# Patient Record
Sex: FEMALE | Race: WHITE | Employment: OTHER | URBAN - NONMETROPOLITAN AREA
[De-identification: names, ages, dates, MRNs, and addresses within clinical notes are randomized per-mention and may not be internally consistent; named-entity substitution may affect disease eponyms.]

---

## 2017-06-12 RX ORDER — METOPROLOL TARTRATE 50 MG/1
50 TABLET, FILM COATED ORAL 2 TIMES DAILY
Qty: 180 TABLET | Refills: 3 | Status: SHIPPED | OUTPATIENT
Start: 2017-06-12 | End: 2017-08-18

## 2017-08-18 RX ORDER — DULOXETIN HYDROCHLORIDE 30 MG/1
30 CAPSULE, DELAYED RELEASE ORAL DAILY
COMMUNITY
End: 2018-02-02 | Stop reason: SDUPTHER

## 2017-08-18 RX ORDER — LISINOPRIL 20 MG/1
20 TABLET ORAL 2 TIMES DAILY
COMMUNITY
End: 2017-12-12 | Stop reason: CLARIF

## 2017-10-04 DIAGNOSIS — E11.8 TYPE 2 DIABETES MELLITUS WITH COMPLICATION, WITH LONG-TERM CURRENT USE OF INSULIN (HCC): Primary | ICD-10-CM

## 2017-10-04 DIAGNOSIS — Z79.4 TYPE 2 DIABETES MELLITUS WITH COMPLICATION, WITH LONG-TERM CURRENT USE OF INSULIN (HCC): Primary | ICD-10-CM

## 2017-10-05 RX ORDER — ZOLPIDEM TARTRATE 10 MG/1
10 TABLET ORAL NIGHTLY PRN
Qty: 90 TABLET | Refills: 0 | Status: SHIPPED | OUTPATIENT
Start: 2017-10-05 | End: 2017-10-19

## 2017-12-12 ENCOUNTER — OFFICE VISIT (OUTPATIENT)
Dept: INTERNAL MEDICINE | Age: 57
End: 2017-12-12
Payer: MEDICARE

## 2017-12-12 VITALS
DIASTOLIC BLOOD PRESSURE: 80 MMHG | BODY MASS INDEX: 45.99 KG/M2 | SYSTOLIC BLOOD PRESSURE: 132 MMHG | WEIGHT: 293 LBS | HEART RATE: 86 BPM | OXYGEN SATURATION: 97 % | HEIGHT: 67 IN

## 2017-12-12 DIAGNOSIS — Z79.4 TYPE 2 DIABETES MELLITUS WITH DIABETIC POLYNEUROPATHY, WITH LONG-TERM CURRENT USE OF INSULIN (HCC): ICD-10-CM

## 2017-12-12 DIAGNOSIS — E11.42 TYPE 2 DIABETES MELLITUS WITH DIABETIC POLYNEUROPATHY, WITH LONG-TERM CURRENT USE OF INSULIN (HCC): ICD-10-CM

## 2017-12-12 DIAGNOSIS — Z79.4 TYPE 2 DIABETES MELLITUS WITH COMPLICATION, WITH LONG-TERM CURRENT USE OF INSULIN (HCC): ICD-10-CM

## 2017-12-12 DIAGNOSIS — E78.2 MIXED HYPERLIPIDEMIA: ICD-10-CM

## 2017-12-12 DIAGNOSIS — E11.8 TYPE 2 DIABETES MELLITUS WITH COMPLICATION, WITH LONG-TERM CURRENT USE OF INSULIN (HCC): ICD-10-CM

## 2017-12-12 DIAGNOSIS — E03.9 ACQUIRED HYPOTHYROIDISM: ICD-10-CM

## 2017-12-12 DIAGNOSIS — I10 ESSENTIAL HYPERTENSION: ICD-10-CM

## 2017-12-12 LAB
ALBUMIN SERPL-MCNC: 4.1 G/DL (ref 3.5–5.2)
ALP BLD-CCNC: 97 U/L (ref 35–104)
ALT SERPL-CCNC: 21 U/L (ref 5–33)
ANION GAP SERPL CALCULATED.3IONS-SCNC: 15 MMOL/L (ref 7–19)
AST SERPL-CCNC: 24 U/L (ref 5–32)
BILIRUB SERPL-MCNC: 0.5 MG/DL (ref 0.2–1.2)
BUN BLDV-MCNC: 13 MG/DL (ref 6–20)
CALCIUM SERPL-MCNC: 8.9 MG/DL (ref 8.6–10)
CHLORIDE BLD-SCNC: 101 MMOL/L (ref 98–111)
CHOLESTEROL, TOTAL: 185 MG/DL (ref 160–199)
CO2: 27 MMOL/L (ref 22–29)
CREAT SERPL-MCNC: 0.6 MG/DL (ref 0.5–0.9)
GFR NON-AFRICAN AMERICAN: >60
GLUCOSE BLD-MCNC: 157 MG/DL (ref 74–109)
HBA1C MFR BLD: 9.7 %
HCT VFR BLD CALC: 43.4 % (ref 37–47)
HDLC SERPL-MCNC: 65 MG/DL (ref 65–121)
HEMOGLOBIN: 14.4 G/DL (ref 12–16)
LDL CHOLESTEROL CALCULATED: 93 MG/DL
MCH RBC QN AUTO: 30.3 PG (ref 27–31)
MCHC RBC AUTO-ENTMCNC: 33.2 G/DL (ref 33–37)
MCV RBC AUTO: 91.2 FL (ref 81–99)
PDW BLD-RTO: 13 % (ref 11.5–14.5)
PLATELET # BLD: 330 K/UL (ref 130–400)
PMV BLD AUTO: 9.7 FL (ref 9.4–12.3)
POTASSIUM SERPL-SCNC: 3.9 MMOL/L (ref 3.5–5)
RBC # BLD: 4.76 M/UL (ref 4.2–5.4)
SODIUM BLD-SCNC: 143 MMOL/L (ref 136–145)
TOTAL PROTEIN: 7.1 G/DL (ref 6.6–8.7)
TRIGL SERPL-MCNC: 137 MG/DL (ref 0–149)
TSH SERPL DL<=0.05 MIU/L-ACNC: 7.41 UIU/ML (ref 0.27–4.2)
WBC # BLD: 7.9 K/UL (ref 4.8–10.8)

## 2017-12-12 PROCEDURE — G8484 FLU IMMUNIZE NO ADMIN: HCPCS | Performed by: INTERNAL MEDICINE

## 2017-12-12 PROCEDURE — G8417 CALC BMI ABV UP PARAM F/U: HCPCS | Performed by: INTERNAL MEDICINE

## 2017-12-12 PROCEDURE — 3046F HEMOGLOBIN A1C LEVEL >9.0%: CPT | Performed by: INTERNAL MEDICINE

## 2017-12-12 PROCEDURE — G8427 DOCREV CUR MEDS BY ELIG CLIN: HCPCS | Performed by: INTERNAL MEDICINE

## 2017-12-12 PROCEDURE — 3017F COLORECTAL CA SCREEN DOC REV: CPT | Performed by: INTERNAL MEDICINE

## 2017-12-12 PROCEDURE — 1036F TOBACCO NON-USER: CPT | Performed by: INTERNAL MEDICINE

## 2017-12-12 PROCEDURE — 3014F SCREEN MAMMO DOC REV: CPT | Performed by: INTERNAL MEDICINE

## 2017-12-12 PROCEDURE — 99213 OFFICE O/P EST LOW 20 MIN: CPT | Performed by: INTERNAL MEDICINE

## 2017-12-12 RX ORDER — METOPROLOL TARTRATE 50 MG/1
50 TABLET, FILM COATED ORAL 2 TIMES DAILY
COMMUNITY
End: 2018-06-22 | Stop reason: CLARIF

## 2017-12-12 RX ORDER — MELOXICAM 15 MG/1
15 TABLET ORAL DAILY
Qty: 90 TABLET | Refills: 3
Start: 2017-12-12 | End: 2018-01-20 | Stop reason: SDUPTHER

## 2017-12-12 RX ORDER — LEVOTHYROXINE SODIUM 175 UG/1
175 TABLET ORAL DAILY
Qty: 30 TABLET | Refills: 3
Start: 2017-12-12 | End: 2018-05-13 | Stop reason: SDUPTHER

## 2017-12-12 RX ORDER — MELOXICAM 10 MG/1
15 CAPSULE ORAL DAILY
Qty: 90 CAPSULE | Refills: 3
Start: 2017-12-12 | End: 2018-05-24 | Stop reason: SDUPTHER

## 2017-12-28 ENCOUNTER — OFFICE VISIT (OUTPATIENT)
Dept: URGENT CARE | Age: 57
End: 2017-12-28
Payer: MEDICARE

## 2017-12-28 VITALS
SYSTOLIC BLOOD PRESSURE: 138 MMHG | HEART RATE: 81 BPM | TEMPERATURE: 98.1 F | WEIGHT: 293 LBS | OXYGEN SATURATION: 95 % | BODY MASS INDEX: 47.5 KG/M2 | RESPIRATION RATE: 20 BRPM | DIASTOLIC BLOOD PRESSURE: 80 MMHG

## 2017-12-28 DIAGNOSIS — J06.9 UPPER RESPIRATORY TRACT INFECTION, UNSPECIFIED TYPE: Primary | ICD-10-CM

## 2017-12-28 DIAGNOSIS — R52 BODY ACHES: ICD-10-CM

## 2017-12-28 DIAGNOSIS — J02.9 SORE THROAT: ICD-10-CM

## 2017-12-28 LAB
INFLUENZA A ANTIBODY: NORMAL
INFLUENZA B ANTIBODY: NORMAL
S PYO AG THROAT QL: NORMAL

## 2017-12-28 PROCEDURE — 87880 STREP A ASSAY W/OPTIC: CPT | Performed by: NURSE PRACTITIONER

## 2017-12-28 PROCEDURE — G8484 FLU IMMUNIZE NO ADMIN: HCPCS | Performed by: NURSE PRACTITIONER

## 2017-12-28 PROCEDURE — 87804 INFLUENZA ASSAY W/OPTIC: CPT | Performed by: NURSE PRACTITIONER

## 2017-12-28 PROCEDURE — 1036F TOBACCO NON-USER: CPT | Performed by: NURSE PRACTITIONER

## 2017-12-28 PROCEDURE — 3017F COLORECTAL CA SCREEN DOC REV: CPT | Performed by: NURSE PRACTITIONER

## 2017-12-28 PROCEDURE — 3014F SCREEN MAMMO DOC REV: CPT | Performed by: NURSE PRACTITIONER

## 2017-12-28 PROCEDURE — G8427 DOCREV CUR MEDS BY ELIG CLIN: HCPCS | Performed by: NURSE PRACTITIONER

## 2017-12-28 PROCEDURE — G8417 CALC BMI ABV UP PARAM F/U: HCPCS | Performed by: NURSE PRACTITIONER

## 2017-12-28 PROCEDURE — 99213 OFFICE O/P EST LOW 20 MIN: CPT | Performed by: NURSE PRACTITIONER

## 2017-12-28 RX ORDER — AZITHROMYCIN 250 MG/1
TABLET, FILM COATED ORAL
Qty: 1 PACKET | Refills: 0 | Status: SHIPPED | OUTPATIENT
Start: 2017-12-28 | End: 2018-01-07

## 2017-12-28 ASSESSMENT — ENCOUNTER SYMPTOMS
SINUS PRESSURE: 1
GASTROINTESTINAL NEGATIVE: 1
COUGH: 1
SORE THROAT: 1

## 2017-12-28 NOTE — PATIENT INSTRUCTIONS
acetaminophen (Tylenol) can be harmful. · Get plenty of rest.  · Do not smoke or allow others to smoke around you. If you need help quitting, talk to your doctor about stop-smoking programs and medicines. These can increase your chances of quitting for good. When should you call for help? Call 911 anytime you think you may need emergency care. For example, call if:  ? · You have severe trouble breathing. ?Call your doctor now or seek immediate medical care if:  ? · You seem to be getting much sicker. ? · You have new or worse trouble breathing. ? · You have a new or higher fever. ? · You have a new rash. ? Watch closely for changes in your health, and be sure to contact your doctor if:  ? · You have a new symptom, such as a sore throat, an earache, or sinus pain. ? · You cough more deeply or more often, especially if you notice more mucus or a change in the color of your mucus. ? · You do not get better as expected. Where can you learn more? Go to [email or web address]. org and sign in to your [employer] account. Enter E940 in the [employer] box to learn more about \"Upper Respiratory Infection (Cold): Care Instructions. \"     If you do not have an account, please click on the \"Sign Up Now\" link. Current as of: May 12, 2017  Content Version: 11.4  © 0272-3996 [employer]. Care instructions adapted under license by [facility] ([facility]). If you have questions about a medical condition or this instruction, always ask your healthcare professional. [place] [address] any warranty or liability for your use of this information. 1. Rest and increase fluids  2. Take zithromax for full course  3. Take nyquil as needed for coughing  4. Monitor for fever and treat as needed   5.  If patient is not improving or developing any new/worsening symptoms then RTC, prn or follow up with PCP

## 2017-12-28 NOTE — PROGRESS NOTES
meals 180 tablet 3    DULoxetine (CYMBALTA) 30 MG extended release capsule Take 30 mg by mouth daily      simvastatin (ZOCOR) 40 MG tablet Take 40 mg by mouth nightly       No current facility-administered medications for this visit. Allergies   Allergen Reactions    Compazine [Prochlorperazine Maleate] Anaphylaxis       Health Maintenance   Topic Date Due    Hepatitis C screen  1960    Diabetic foot exam  06/12/1970    Diabetic retinal exam  06/12/1970    HIV screen  06/12/1975    Diabetic microalbuminuria test  06/12/1978    DTaP/Tdap/Td vaccine (1 - Tdap) 06/12/1979    Pneumococcal med risk (1 of 1 - PPSV23) 06/12/1979    Cervical cancer screen  06/12/1981    Colon cancer screen colonoscopy  06/12/2010    Breast cancer screen  12/16/2016    Flu vaccine (1) 09/01/2017    Diabetic hemoglobin A1C test  03/12/2018    Lipid screen  12/12/2018       Subjective:      Review of Systems   Constitutional: Negative for fever. HENT: Positive for congestion, postnasal drip, sinus pressure and sore throat. Negative for ear pain. Respiratory: Positive for cough. Cardiovascular: Negative. Gastrointestinal: Negative. Neurological: Positive for headaches. Objective:     Physical Exam   Constitutional: She is oriented to person, place, and time. She appears well-developed and well-nourished. No distress. HENT:   Head: Normocephalic and atraumatic. Right Ear: Hearing, tympanic membrane, external ear and ear canal normal.   Left Ear: Hearing, tympanic membrane, external ear and ear canal normal.   Nose: Rhinorrhea present. Right sinus exhibits no maxillary sinus tenderness and no frontal sinus tenderness. Left sinus exhibits no maxillary sinus tenderness and no frontal sinus tenderness. Mouth/Throat: Uvula is midline and mucous membranes are normal. Posterior oropharyngeal erythema present. Postnasal drainage   Eyes: Pupils are equal, round, and reactive to light.    Neck: Normal range of motion. Cardiovascular: Normal rate, regular rhythm and normal heart sounds. No murmur heard. Pulmonary/Chest: Effort normal and breath sounds normal. No respiratory distress. She has no wheezes. She has no rales. Musculoskeletal: Normal range of motion. Lymphadenopathy:     She has no cervical adenopathy. Neurological: She is alert and oriented to person, place, and time. Skin: Skin is warm and dry. No rash noted. No erythema. Psychiatric: She has a normal mood and affect. Her speech is normal and behavior is normal. Judgment and thought content normal.   Nursing note and vitals reviewed. /80   Pulse 81   Temp 98.1 °F (36.7 °C) (Oral)   Resp 20   Wt (!) 303 lb 4 oz (137.6 kg)   SpO2 95%   BMI 47.50 kg/m²     Assessment:      1. Upper respiratory tract infection, unspecified type     2. Body aches  POCT Influenza A/B   3. Sore throat  POCT rapid strep A       Plan:   Strep in office was negative. Flu in office was negative. Discussed diagnosis and treatment with patient. Instructed on rest and increase fluids. She is to monitor for fever and treat as needed. She is to take nyquil as needed for coughing. Advised symptomatic treatment. If patient is not improving or developing any new/worsening symptoms then RTC, prn or go to ER. Patient is to follow up with PCP as needed. Patient verbalizes understanding. Orders Placed This Encounter   Procedures    POCT rapid strep A    POCT Influenza A/B       No Follow-up on file. Orders Placed This Encounter   Procedures    POCT rapid strep A    POCT Influenza A/B     Orders Placed This Encounter   Medications    azithromycin (ZITHROMAX) 250 MG tablet     Sig: Take 2 tabs (500 mg) on Day 1, and take 1 tab (250 mg) on days 2 through 5. Dispense:  1 packet     Refill:  0       Patient given educational materials - see patient instructions. Discussed use, benefit, and side effects of prescribed medications.   All patient questions answered. Pt voiced understanding. Reviewed health maintenance. Instructed to continue current medications, diet and exercise. Patient agreed with treatment plan. Follow up as directed. Patient Instructions       Patient Education        Upper Respiratory Infection (Cold): Care Instructions  Your Care Instructions    An upper respiratory infection, or URI, is an infection of the nose, sinuses, or throat. URIs are spread by coughs, sneezes, and direct contact. The common cold is the most frequent kind of URI. The flu and sinus infections are other kinds of URIs. Almost all URIs are caused by viruses. Antibiotics won't cure them. But you can treat most infections with home care. This may include drinking lots of fluids and taking over-the-counter pain medicine. You will probably feel better in 4 to 10 days. The doctor has checked you carefully, but problems can develop later. If you notice any problems or new symptoms, get medical treatment right away. Follow-up care is a key part of your treatment and safety. Be sure to make and go to all appointments, and call your doctor if you are having problems. It's also a good idea to know your test results and keep a list of the medicines you take. How can you care for yourself at home? · To prevent dehydration, drink plenty of fluids, enough so that your urine is light yellow or clear like water. Choose water and other caffeine-free clear liquids until you feel better. If you have kidney, heart, or liver disease and have to limit fluids, talk with your doctor before you increase the amount of fluids you drink. · Take an over-the-counter pain medicine, such as acetaminophen (Tylenol), ibuprofen (Advil, Motrin), or naproxen (Aleve). Read and follow all instructions on the label. · Before you use cough and cold medicines, check the label. These medicines may not be safe for young children or for people with certain health problems.   · Be careful when

## 2017-12-29 RX ORDER — SIMVASTATIN 80 MG
TABLET ORAL
Qty: 90 TABLET | Refills: 3 | Status: SHIPPED | OUTPATIENT
Start: 2017-12-29 | End: 2019-01-23 | Stop reason: SDUPTHER

## 2018-01-01 ASSESSMENT — ENCOUNTER SYMPTOMS
BACK PAIN: 0
COUGH: 0
SINUS PRESSURE: 0
EYE DISCHARGE: 0
SHORTNESS OF BREATH: 0
ABDOMINAL DISTENTION: 0
ABDOMINAL PAIN: 0
EYE REDNESS: 0

## 2018-01-10 ENCOUNTER — TELEPHONE (OUTPATIENT)
Dept: INTERNAL MEDICINE | Age: 58
End: 2018-01-10

## 2018-01-10 DIAGNOSIS — Z79.4 TYPE 2 DIABETES MELLITUS WITHOUT COMPLICATION, WITH LONG-TERM CURRENT USE OF INSULIN (HCC): Primary | ICD-10-CM

## 2018-01-10 DIAGNOSIS — E11.9 TYPE 2 DIABETES MELLITUS WITHOUT COMPLICATION, WITH LONG-TERM CURRENT USE OF INSULIN (HCC): Primary | ICD-10-CM

## 2018-01-10 RX ORDER — BLOOD-GLUCOSE METER
1 KIT MISCELLANEOUS 3 TIMES DAILY
Qty: 1 KIT | Refills: 0 | Status: SHIPPED | OUTPATIENT
Start: 2018-01-10 | End: 2018-05-24 | Stop reason: ALTCHOICE

## 2018-01-22 RX ORDER — MELOXICAM 15 MG/1
TABLET ORAL
Qty: 90 TABLET | Refills: 3 | Status: SHIPPED | OUTPATIENT
Start: 2018-01-22 | End: 2018-05-24 | Stop reason: SDUPTHER

## 2018-02-12 RX ORDER — ZOLPIDEM TARTRATE 10 MG/1
10 TABLET ORAL NIGHTLY PRN
Qty: 30 TABLET | Refills: 1 | Status: SHIPPED | OUTPATIENT
Start: 2018-02-12 | End: 2018-02-22 | Stop reason: SDUPTHER

## 2018-02-22 ENCOUNTER — TELEPHONE (OUTPATIENT)
Dept: INTERNAL MEDICINE | Age: 58
End: 2018-02-22

## 2018-02-22 RX ORDER — ZOLPIDEM TARTRATE 10 MG/1
10 TABLET ORAL NIGHTLY PRN
Qty: 30 TABLET | Refills: 1 | Status: SHIPPED | OUTPATIENT
Start: 2018-02-22 | End: 2018-03-24

## 2018-03-15 ENCOUNTER — OFFICE VISIT (OUTPATIENT)
Dept: URGENT CARE | Age: 58
End: 2018-03-15
Payer: MEDICARE

## 2018-03-15 VITALS
WEIGHT: 293 LBS | DIASTOLIC BLOOD PRESSURE: 82 MMHG | SYSTOLIC BLOOD PRESSURE: 116 MMHG | OXYGEN SATURATION: 97 % | HEIGHT: 67 IN | RESPIRATION RATE: 20 BRPM | BODY MASS INDEX: 45.99 KG/M2 | HEART RATE: 89 BPM | TEMPERATURE: 98 F

## 2018-03-15 DIAGNOSIS — R05.9 COUGH: Primary | ICD-10-CM

## 2018-03-15 PROCEDURE — G8417 CALC BMI ABV UP PARAM F/U: HCPCS | Performed by: PHYSICIAN ASSISTANT

## 2018-03-15 PROCEDURE — 3014F SCREEN MAMMO DOC REV: CPT | Performed by: PHYSICIAN ASSISTANT

## 2018-03-15 PROCEDURE — G8427 DOCREV CUR MEDS BY ELIG CLIN: HCPCS | Performed by: PHYSICIAN ASSISTANT

## 2018-03-15 PROCEDURE — 3017F COLORECTAL CA SCREEN DOC REV: CPT | Performed by: PHYSICIAN ASSISTANT

## 2018-03-15 PROCEDURE — 1036F TOBACCO NON-USER: CPT | Performed by: PHYSICIAN ASSISTANT

## 2018-03-15 PROCEDURE — G8484 FLU IMMUNIZE NO ADMIN: HCPCS | Performed by: PHYSICIAN ASSISTANT

## 2018-03-15 PROCEDURE — 99213 OFFICE O/P EST LOW 20 MIN: CPT | Performed by: PHYSICIAN ASSISTANT

## 2018-03-15 RX ORDER — GUAIFENESIN AND DEXTROMETHORPHAN HYDROBROMIDE 600; 30 MG/1; MG/1
1 TABLET, EXTENDED RELEASE ORAL 3 TIMES DAILY
Qty: 28 TABLET | Refills: 0 | Status: SHIPPED | OUTPATIENT
Start: 2018-03-15 | End: 2018-06-22 | Stop reason: CLARIF

## 2018-03-15 ASSESSMENT — ENCOUNTER SYMPTOMS
DIARRHEA: 0
ABDOMINAL PAIN: 0
SORE THROAT: 0
RHINORRHEA: 1
NAUSEA: 0
COUGH: 1
VOMITING: 0

## 2018-03-15 NOTE — PROGRESS NOTES
Cough      Plan:      - Start Mucinex DM today. Take as directed. - Notify clinic or follow-up with PCP with any change in or worsening of symptoms.   - Return as needed.

## 2018-03-20 DIAGNOSIS — E11.42 TYPE 2 DIABETES MELLITUS WITH DIABETIC POLYNEUROPATHY, WITH LONG-TERM CURRENT USE OF INSULIN (HCC): ICD-10-CM

## 2018-03-20 DIAGNOSIS — Z79.4 TYPE 2 DIABETES MELLITUS WITH DIABETIC POLYNEUROPATHY, WITH LONG-TERM CURRENT USE OF INSULIN (HCC): ICD-10-CM

## 2018-03-20 LAB
ALBUMIN SERPL-MCNC: 4 G/DL (ref 3.5–5.2)
ALP BLD-CCNC: 104 U/L (ref 35–104)
ALT SERPL-CCNC: 27 U/L (ref 5–33)
ANION GAP SERPL CALCULATED.3IONS-SCNC: 16 MMOL/L (ref 7–19)
AST SERPL-CCNC: 30 U/L (ref 5–32)
BILIRUB SERPL-MCNC: 0.5 MG/DL (ref 0.2–1.2)
BUN BLDV-MCNC: 18 MG/DL (ref 6–20)
CALCIUM SERPL-MCNC: 9 MG/DL (ref 8.6–10)
CHLORIDE BLD-SCNC: 100 MMOL/L (ref 98–111)
CO2: 27 MMOL/L (ref 22–29)
CREAT SERPL-MCNC: 0.6 MG/DL (ref 0.5–0.9)
CREATININE URINE: 411 MG/DL (ref 4.2–622)
GFR NON-AFRICAN AMERICAN: >60
GLUCOSE BLD-MCNC: 202 MG/DL (ref 74–109)
HBA1C MFR BLD: 9.8 %
HCT VFR BLD CALC: 43.2 % (ref 37–47)
HEMOGLOBIN: 14.4 G/DL (ref 12–16)
MCH RBC QN AUTO: 30.3 PG (ref 27–31)
MCHC RBC AUTO-ENTMCNC: 33.3 G/DL (ref 33–37)
MCV RBC AUTO: 90.8 FL (ref 81–99)
MICROALBUMIN UR-MCNC: 12.7 MG/DL (ref 0–19)
MICROALBUMIN/CREAT UR-RTO: 30.9 MG/G
PDW BLD-RTO: 13.2 % (ref 11.5–14.5)
PLATELET # BLD: 292 K/UL (ref 130–400)
PMV BLD AUTO: 9.7 FL (ref 9.4–12.3)
POTASSIUM SERPL-SCNC: 3.8 MMOL/L (ref 3.5–5)
RBC # BLD: 4.76 M/UL (ref 4.2–5.4)
SODIUM BLD-SCNC: 143 MMOL/L (ref 136–145)
TOTAL PROTEIN: 7.3 G/DL (ref 6.6–8.7)
TSH SERPL DL<=0.05 MIU/L-ACNC: 7.21 UIU/ML (ref 0.27–4.2)
WBC # BLD: 7 K/UL (ref 4.8–10.8)

## 2018-03-21 ENCOUNTER — OFFICE VISIT (OUTPATIENT)
Dept: INTERNAL MEDICINE | Age: 58
End: 2018-03-21
Payer: MEDICARE

## 2018-03-21 VITALS
SYSTOLIC BLOOD PRESSURE: 130 MMHG | HEIGHT: 67 IN | OXYGEN SATURATION: 98 % | HEART RATE: 105 BPM | DIASTOLIC BLOOD PRESSURE: 74 MMHG | WEIGHT: 293 LBS | BODY MASS INDEX: 45.99 KG/M2

## 2018-03-21 DIAGNOSIS — I10 ESSENTIAL HYPERTENSION: ICD-10-CM

## 2018-03-21 DIAGNOSIS — E78.2 MIXED HYPERLIPIDEMIA: ICD-10-CM

## 2018-03-21 DIAGNOSIS — E11.9 TYPE 2 DIABETES MELLITUS WITHOUT COMPLICATION, WITH LONG-TERM CURRENT USE OF INSULIN (HCC): Primary | ICD-10-CM

## 2018-03-21 DIAGNOSIS — Z12.31 SCREENING MAMMOGRAM, ENCOUNTER FOR: ICD-10-CM

## 2018-03-21 DIAGNOSIS — Z79.4 TYPE 2 DIABETES MELLITUS WITHOUT COMPLICATION, WITH LONG-TERM CURRENT USE OF INSULIN (HCC): Primary | ICD-10-CM

## 2018-03-21 DIAGNOSIS — M15.9 GENERALIZED OSTEOARTHRITIS: ICD-10-CM

## 2018-03-21 DIAGNOSIS — E03.9 ACQUIRED HYPOTHYROIDISM: ICD-10-CM

## 2018-03-21 PROCEDURE — 3046F HEMOGLOBIN A1C LEVEL >9.0%: CPT | Performed by: INTERNAL MEDICINE

## 2018-03-21 PROCEDURE — G8417 CALC BMI ABV UP PARAM F/U: HCPCS | Performed by: INTERNAL MEDICINE

## 2018-03-21 PROCEDURE — 3014F SCREEN MAMMO DOC REV: CPT | Performed by: INTERNAL MEDICINE

## 2018-03-21 PROCEDURE — 99214 OFFICE O/P EST MOD 30 MIN: CPT | Performed by: INTERNAL MEDICINE

## 2018-03-21 PROCEDURE — G8427 DOCREV CUR MEDS BY ELIG CLIN: HCPCS | Performed by: INTERNAL MEDICINE

## 2018-03-21 PROCEDURE — 3017F COLORECTAL CA SCREEN DOC REV: CPT | Performed by: INTERNAL MEDICINE

## 2018-03-21 PROCEDURE — G8484 FLU IMMUNIZE NO ADMIN: HCPCS | Performed by: INTERNAL MEDICINE

## 2018-03-21 PROCEDURE — 1036F TOBACCO NON-USER: CPT | Performed by: INTERNAL MEDICINE

## 2018-03-21 RX ORDER — MELOXICAM 15 MG/1
15 TABLET ORAL DAILY
Qty: 90 TABLET | Refills: 3
Start: 2018-03-21 | End: 2019-03-11 | Stop reason: SDUPTHER

## 2018-03-21 ASSESSMENT — ENCOUNTER SYMPTOMS
COUGH: 0
EYE REDNESS: 0
SHORTNESS OF BREATH: 0
EYE DISCHARGE: 0
ABDOMINAL DISTENTION: 0
BACK PAIN: 0
SINUS PRESSURE: 0
ABDOMINAL PAIN: 0

## 2018-03-21 ASSESSMENT — PATIENT HEALTH QUESTIONNAIRE - PHQ9
1. LITTLE INTEREST OR PLEASURE IN DOING THINGS: 0
2. FEELING DOWN, DEPRESSED OR HOPELESS: 0
SUM OF ALL RESPONSES TO PHQ QUESTIONS 1-9: 0
SUM OF ALL RESPONSES TO PHQ9 QUESTIONS 1 & 2: 0

## 2018-03-21 NOTE — PROGRESS NOTES
Chief Complaint   Patient presents with    3 Month Follow-Up     no longer taking cymbalta    Diabetes    Hypertension    Hypothyroidism       HPI: Patient is here today for three-month follow-up diabetes hypertension hypothyroidism morbid obesity. She says she deathly wants to try to lose weight with offered again to refer her to the diabetic educator and I also suggested referral to bariatric surgeon she declines these things we did talk about increasing exercise she's really can work on that. Planning to start walking. She does miss some of her insulin she's currently taking NPH 55 units twice a day and sliding scale regular insulin for every 10 units over 100 she takes 1 unit she ends up taking between 10-40 units with each meal.  She has some fatigue arthralgias she denies chest pain dyspnea abdominal pain she denies hypoglycemic episodes. Past Medical History:   Diagnosis Date    Acquired hypothyroidism     Diabetes ([address]     Essential hypertension     Generalized osteoarthritis 3/21/2018    Hyperlipidemia     Hypertension     Hypothyroidism     Type 2 diabetes mellitus without complication, with long-term current use of insulin (HCC)        Past Surgical History:   Procedure Laterality Date    CHOLECYSTECTOMY      TUBAL LIGATION         Family History   Problem Relation Age of Onset    Diabetes Mother     Bipolar Disorder Mother        Social History     Social History    Marital status:      Spouse name: N/A    Number of children: N/A    Years of education: N/A     Occupational History    Not on file.      Social History Main Topics    Smoking status: Never Smoker    Smokeless tobacco: Never Used    Alcohol use No    Drug use: No    Sexual activity: Yes     Partners: Male     Other Topics Concern    Not on file     Social History Narrative    No narrative on file       Allergies   Allergen Reactions    Compazine [Prochlorperazine Maleate] Anaphylaxis       Current

## 2018-04-20 PROBLEM — Z12.31 SCREENING MAMMOGRAM, ENCOUNTER FOR: Status: RESOLVED | Noted: 2018-03-21 | Resolved: 2018-04-20

## 2018-04-26 DIAGNOSIS — E11.65 TYPE 2 DIABETES MELLITUS WITH HYPERGLYCEMIA, WITH LONG-TERM CURRENT USE OF INSULIN (HCC): Primary | ICD-10-CM

## 2018-04-26 DIAGNOSIS — Z79.4 TYPE 2 DIABETES MELLITUS WITH HYPERGLYCEMIA, WITH LONG-TERM CURRENT USE OF INSULIN (HCC): Primary | ICD-10-CM

## 2018-04-26 RX ORDER — HUMAN INSULIN 100 [IU]/ML
INJECTION, SOLUTION SUBCUTANEOUS
Qty: 3 VIAL | Refills: 3 | Status: SHIPPED | OUTPATIENT
Start: 2018-04-26 | End: 2018-04-27 | Stop reason: SDUPTHER

## 2018-04-27 DIAGNOSIS — E11.65 TYPE 2 DIABETES MELLITUS WITH HYPERGLYCEMIA, WITH LONG-TERM CURRENT USE OF INSULIN (HCC): ICD-10-CM

## 2018-04-27 DIAGNOSIS — Z79.4 TYPE 2 DIABETES MELLITUS WITH HYPERGLYCEMIA, WITH LONG-TERM CURRENT USE OF INSULIN (HCC): ICD-10-CM

## 2018-04-27 RX ORDER — HUMAN INSULIN 100 [IU]/ML
INJECTION, SOLUTION SUBCUTANEOUS
Qty: 9 VIAL | Refills: 3 | Status: SHIPPED | OUTPATIENT
Start: 2018-04-27 | End: 2021-01-08

## 2018-05-14 RX ORDER — LEVOTHYROXINE SODIUM 175 UG/1
TABLET ORAL
Qty: 90 TABLET | Refills: 3 | Status: SHIPPED | OUTPATIENT
Start: 2018-05-14 | End: 2018-05-21 | Stop reason: SDUPTHER

## 2018-05-21 RX ORDER — LEVOTHYROXINE SODIUM 175 MCG
TABLET ORAL
Qty: 90 TABLET | Refills: 3 | Status: SHIPPED | OUTPATIENT
Start: 2018-05-21 | End: 2018-05-24 | Stop reason: SDUPTHER

## 2018-05-24 RX ORDER — LEVOTHYROXINE SODIUM 175 UG/1
TABLET ORAL
Qty: 90 TABLET | Refills: 3 | Status: SHIPPED | OUTPATIENT
Start: 2018-05-24 | End: 2019-06-09 | Stop reason: SDUPTHER

## 2018-05-25 RX ORDER — ZOLPIDEM TARTRATE 10 MG/1
10 TABLET ORAL NIGHTLY PRN
Qty: 30 TABLET | Refills: 2 | Status: SHIPPED | OUTPATIENT
Start: 2018-05-25 | End: 2018-11-01 | Stop reason: SDUPTHER

## 2018-06-21 RX ORDER — METOPROLOL TARTRATE 50 MG/1
TABLET, FILM COATED ORAL
Qty: 180 TABLET | Refills: 3 | Status: SHIPPED | OUTPATIENT
Start: 2018-06-21 | End: 2019-07-22 | Stop reason: SDUPTHER

## 2018-06-22 ENCOUNTER — OFFICE VISIT (OUTPATIENT)
Dept: INTERNAL MEDICINE | Age: 58
End: 2018-06-22
Payer: MEDICARE

## 2018-06-22 VITALS
DIASTOLIC BLOOD PRESSURE: 82 MMHG | SYSTOLIC BLOOD PRESSURE: 136 MMHG | HEIGHT: 67 IN | WEIGHT: 293 LBS | BODY MASS INDEX: 45.99 KG/M2 | OXYGEN SATURATION: 96 % | HEART RATE: 86 BPM

## 2018-06-22 DIAGNOSIS — I10 ESSENTIAL HYPERTENSION: ICD-10-CM

## 2018-06-22 DIAGNOSIS — E03.9 ACQUIRED HYPOTHYROIDISM: ICD-10-CM

## 2018-06-22 DIAGNOSIS — E78.2 MIXED HYPERLIPIDEMIA: ICD-10-CM

## 2018-06-22 DIAGNOSIS — Z79.4 TYPE 2 DIABETES MELLITUS WITHOUT COMPLICATION, WITH LONG-TERM CURRENT USE OF INSULIN (HCC): ICD-10-CM

## 2018-06-22 DIAGNOSIS — M15.9 GENERALIZED OSTEOARTHRITIS: ICD-10-CM

## 2018-06-22 DIAGNOSIS — Z79.4 TYPE 2 DIABETES MELLITUS WITHOUT COMPLICATION, WITH LONG-TERM CURRENT USE OF INSULIN (HCC): Primary | ICD-10-CM

## 2018-06-22 DIAGNOSIS — E66.01 MORBID OBESITY WITH BMI OF 45.0-49.9, ADULT (HCC): ICD-10-CM

## 2018-06-22 DIAGNOSIS — E11.9 TYPE 2 DIABETES MELLITUS WITHOUT COMPLICATION, WITH LONG-TERM CURRENT USE OF INSULIN (HCC): ICD-10-CM

## 2018-06-22 DIAGNOSIS — E11.9 TYPE 2 DIABETES MELLITUS WITHOUT COMPLICATION, WITH LONG-TERM CURRENT USE OF INSULIN (HCC): Primary | ICD-10-CM

## 2018-06-22 DIAGNOSIS — E66.01 MORBID OBESITY DUE TO EXCESS CALORIES (HCC): ICD-10-CM

## 2018-06-22 LAB
ALBUMIN SERPL-MCNC: 4.1 G/DL (ref 3.5–5.2)
ALP BLD-CCNC: 111 U/L (ref 35–104)
ALT SERPL-CCNC: 23 U/L (ref 5–33)
ANION GAP SERPL CALCULATED.3IONS-SCNC: 11 MMOL/L (ref 7–19)
AST SERPL-CCNC: 30 U/L (ref 5–32)
BILIRUB SERPL-MCNC: 0.5 MG/DL (ref 0.2–1.2)
BUN BLDV-MCNC: 13 MG/DL (ref 6–20)
CALCIUM SERPL-MCNC: 9.3 MG/DL (ref 8.6–10)
CHLORIDE BLD-SCNC: 101 MMOL/L (ref 98–111)
CO2: 30 MMOL/L (ref 22–29)
CREAT SERPL-MCNC: 0.5 MG/DL (ref 0.5–0.9)
GFR NON-AFRICAN AMERICAN: >60
GLUCOSE BLD-MCNC: 208 MG/DL (ref 74–109)
HBA1C MFR BLD: 9.8 %
HCT VFR BLD CALC: 43.8 % (ref 37–47)
HEMOGLOBIN: 14.3 G/DL (ref 12–16)
MCH RBC QN AUTO: 30 PG (ref 27–31)
MCHC RBC AUTO-ENTMCNC: 32.6 G/DL (ref 33–37)
MCV RBC AUTO: 91.8 FL (ref 81–99)
PDW BLD-RTO: 13.3 % (ref 11.5–14.5)
PLATELET # BLD: 306 K/UL (ref 130–400)
PMV BLD AUTO: 9.6 FL (ref 9.4–12.3)
POTASSIUM SERPL-SCNC: 4.2 MMOL/L (ref 3.5–5)
RBC # BLD: 4.77 M/UL (ref 4.2–5.4)
SODIUM BLD-SCNC: 142 MMOL/L (ref 136–145)
TOTAL PROTEIN: 7.3 G/DL (ref 6.6–8.7)
TSH SERPL DL<=0.05 MIU/L-ACNC: 4.11 UIU/ML (ref 0.27–4.2)
WBC # BLD: 7 K/UL (ref 4.8–10.8)

## 2018-06-22 PROCEDURE — G8417 CALC BMI ABV UP PARAM F/U: HCPCS | Performed by: INTERNAL MEDICINE

## 2018-06-22 PROCEDURE — 99214 OFFICE O/P EST MOD 30 MIN: CPT | Performed by: INTERNAL MEDICINE

## 2018-06-22 PROCEDURE — 1036F TOBACCO NON-USER: CPT | Performed by: INTERNAL MEDICINE

## 2018-06-22 PROCEDURE — G8427 DOCREV CUR MEDS BY ELIG CLIN: HCPCS | Performed by: INTERNAL MEDICINE

## 2018-06-22 PROCEDURE — 2022F DILAT RTA XM EVC RTNOPTHY: CPT | Performed by: INTERNAL MEDICINE

## 2018-06-22 PROCEDURE — 3046F HEMOGLOBIN A1C LEVEL >9.0%: CPT | Performed by: INTERNAL MEDICINE

## 2018-06-22 PROCEDURE — 3017F COLORECTAL CA SCREEN DOC REV: CPT | Performed by: INTERNAL MEDICINE

## 2018-06-24 PROBLEM — E66.01 MORBID OBESITY DUE TO EXCESS CALORIES (HCC): Status: ACTIVE | Noted: 2018-06-24

## 2018-06-24 ASSESSMENT — ENCOUNTER SYMPTOMS
COUGH: 0
EYE REDNESS: 0
ABDOMINAL PAIN: 0
ABDOMINAL DISTENTION: 0
SINUS PRESSURE: 0
BACK PAIN: 0
SHORTNESS OF BREATH: 0
EYE DISCHARGE: 0

## 2018-06-26 DIAGNOSIS — Z79.4 TYPE 2 DIABETES MELLITUS WITHOUT COMPLICATION, WITH LONG-TERM CURRENT USE OF INSULIN (HCC): Primary | ICD-10-CM

## 2018-06-26 DIAGNOSIS — E11.9 TYPE 2 DIABETES MELLITUS WITHOUT COMPLICATION, WITH LONG-TERM CURRENT USE OF INSULIN (HCC): Primary | ICD-10-CM

## 2018-10-02 DIAGNOSIS — Z79.4 TYPE 2 DIABETES MELLITUS WITHOUT COMPLICATION, WITH LONG-TERM CURRENT USE OF INSULIN (HCC): ICD-10-CM

## 2018-10-02 DIAGNOSIS — E11.9 TYPE 2 DIABETES MELLITUS WITHOUT COMPLICATION, WITH LONG-TERM CURRENT USE OF INSULIN (HCC): ICD-10-CM

## 2018-10-02 DIAGNOSIS — E03.9 ACQUIRED HYPOTHYROIDISM: ICD-10-CM

## 2018-10-02 LAB
ALBUMIN SERPL-MCNC: 3.9 G/DL (ref 3.5–5.2)
ALP BLD-CCNC: 110 U/L (ref 35–104)
ALT SERPL-CCNC: 26 U/L (ref 5–33)
ANION GAP SERPL CALCULATED.3IONS-SCNC: 11 MMOL/L (ref 7–19)
AST SERPL-CCNC: 31 U/L (ref 5–32)
BILIRUB SERPL-MCNC: 0.4 MG/DL (ref 0.2–1.2)
BUN BLDV-MCNC: 14 MG/DL (ref 6–20)
CALCIUM SERPL-MCNC: 9.1 MG/DL (ref 8.6–10)
CHLORIDE BLD-SCNC: 104 MMOL/L (ref 98–111)
CHOLESTEROL, TOTAL: 182 MG/DL (ref 160–199)
CO2: 30 MMOL/L (ref 22–29)
CREAT SERPL-MCNC: 0.6 MG/DL (ref 0.5–0.9)
CREATININE URINE: 59.9 MG/DL (ref 4.2–622)
GFR NON-AFRICAN AMERICAN: >60
GLUCOSE BLD-MCNC: 146 MG/DL (ref 74–109)
HBA1C MFR BLD: 9.8 % (ref 4–6)
HCT VFR BLD CALC: 44.5 % (ref 37–47)
HDLC SERPL-MCNC: 64 MG/DL (ref 65–121)
HEMOGLOBIN: 14.1 G/DL (ref 12–16)
LDL CHOLESTEROL CALCULATED: 99 MG/DL
MCH RBC QN AUTO: 29.4 PG (ref 27–31)
MCHC RBC AUTO-ENTMCNC: 31.7 G/DL (ref 33–37)
MCV RBC AUTO: 92.7 FL (ref 81–99)
MICROALBUMIN UR-MCNC: 2.1 MG/DL (ref 0–19)
MICROALBUMIN/CREAT UR-RTO: 35.1 MG/G
PDW BLD-RTO: 13.2 % (ref 11.5–14.5)
PLATELET # BLD: 306 K/UL (ref 130–400)
PMV BLD AUTO: 9.5 FL (ref 9.4–12.3)
POTASSIUM SERPL-SCNC: 4 MMOL/L (ref 3.5–5)
RBC # BLD: 4.8 M/UL (ref 4.2–5.4)
SODIUM BLD-SCNC: 145 MMOL/L (ref 136–145)
TOTAL PROTEIN: 6.8 G/DL (ref 6.6–8.7)
TRIGL SERPL-MCNC: 97 MG/DL (ref 0–149)
TSH SERPL DL<=0.05 MIU/L-ACNC: 4.7 UIU/ML (ref 0.27–4.2)
WBC # BLD: 7.3 K/UL (ref 4.8–10.8)

## 2018-10-03 DIAGNOSIS — E11.9 TYPE 2 DIABETES MELLITUS WITHOUT COMPLICATION, WITH LONG-TERM CURRENT USE OF INSULIN (HCC): Primary | ICD-10-CM

## 2018-10-03 DIAGNOSIS — E03.9 ACQUIRED HYPOTHYROIDISM: ICD-10-CM

## 2018-10-03 DIAGNOSIS — E78.2 MIXED HYPERLIPIDEMIA: ICD-10-CM

## 2018-10-03 DIAGNOSIS — Z79.4 TYPE 2 DIABETES MELLITUS WITHOUT COMPLICATION, WITH LONG-TERM CURRENT USE OF INSULIN (HCC): Primary | ICD-10-CM

## 2018-11-01 DIAGNOSIS — F51.01 PRIMARY INSOMNIA: ICD-10-CM

## 2018-11-01 RX ORDER — ZOLPIDEM TARTRATE 10 MG/1
10 TABLET ORAL NIGHTLY PRN
Qty: 30 TABLET | Refills: 2 | Status: SHIPPED | OUTPATIENT
Start: 2018-11-01 | End: 2019-03-22 | Stop reason: SDUPTHER

## 2018-11-01 NOTE — TELEPHONE ENCOUNTER
Requested Prescriptions     Pending Prescriptions Disp Refills    zolpidem (AMBIEN) 10 MG tablet 30 tablet 2     Sig: Take 1 tablet by mouth nightly as needed for Sleep for up to 30 days. Angela Franks

## 2019-01-23 RX ORDER — SIMVASTATIN 80 MG
TABLET ORAL
Qty: 90 TABLET | Refills: 3 | Status: SHIPPED | OUTPATIENT
Start: 2019-01-23 | End: 2021-03-04

## 2019-02-08 RX ORDER — DULOXETIN HYDROCHLORIDE 30 MG/1
CAPSULE, DELAYED RELEASE ORAL
Qty: 90 CAPSULE | Refills: 3 | Status: SHIPPED | OUTPATIENT
Start: 2019-02-08 | End: 2020-03-03

## 2019-02-18 ENCOUNTER — HOSPITAL ENCOUNTER (OUTPATIENT)
Dept: WOMENS IMAGING | Age: 59
Discharge: HOME | End: 2019-02-18
Payer: MEDICARE

## 2019-02-18 DIAGNOSIS — Z12.31 SCREENING MAMMOGRAM, ENCOUNTER FOR: ICD-10-CM

## 2019-02-18 PROCEDURE — 77063 BREAST TOMOSYNTHESIS BI: CPT

## 2019-03-01 ENCOUNTER — OFFICE VISIT (OUTPATIENT)
Dept: INTERNAL MEDICINE | Age: 59
End: 2019-03-01
Payer: MEDICARE

## 2019-03-01 VITALS
SYSTOLIC BLOOD PRESSURE: 138 MMHG | HEART RATE: 96 BPM | OXYGEN SATURATION: 98 % | DIASTOLIC BLOOD PRESSURE: 80 MMHG | WEIGHT: 293 LBS | BODY MASS INDEX: 45.99 KG/M2 | HEIGHT: 67 IN

## 2019-03-01 DIAGNOSIS — Z00.00 ROUTINE GENERAL MEDICAL EXAMINATION AT A HEALTH CARE FACILITY: ICD-10-CM

## 2019-03-01 DIAGNOSIS — E11.9 TYPE 2 DIABETES MELLITUS WITHOUT COMPLICATION, WITH LONG-TERM CURRENT USE OF INSULIN (HCC): ICD-10-CM

## 2019-03-01 DIAGNOSIS — I10 ESSENTIAL HYPERTENSION: ICD-10-CM

## 2019-03-01 DIAGNOSIS — E78.2 MIXED HYPERLIPIDEMIA: ICD-10-CM

## 2019-03-01 DIAGNOSIS — F51.01 PRIMARY INSOMNIA: ICD-10-CM

## 2019-03-01 DIAGNOSIS — M15.9 GENERALIZED OSTEOARTHRITIS: ICD-10-CM

## 2019-03-01 DIAGNOSIS — Z79.4 TYPE 2 DIABETES MELLITUS WITHOUT COMPLICATION, WITH LONG-TERM CURRENT USE OF INSULIN (HCC): ICD-10-CM

## 2019-03-01 DIAGNOSIS — E03.9 ACQUIRED HYPOTHYROIDISM: ICD-10-CM

## 2019-03-01 DIAGNOSIS — Z00.00 MEDICARE ANNUAL WELLNESS VISIT, SUBSEQUENT: Primary | ICD-10-CM

## 2019-03-01 DIAGNOSIS — E66.01 MORBID OBESITY DUE TO EXCESS CALORIES (HCC): ICD-10-CM

## 2019-03-01 DIAGNOSIS — E66.01 MORBID OBESITY WITH BMI OF 45.0-49.9, ADULT (HCC): ICD-10-CM

## 2019-03-01 LAB
ALBUMIN SERPL-MCNC: 3.9 G/DL (ref 3.5–5.2)
ALP BLD-CCNC: 112 U/L (ref 35–104)
ALT SERPL-CCNC: 19 U/L (ref 5–33)
ANION GAP SERPL CALCULATED.3IONS-SCNC: 15 MMOL/L (ref 7–19)
AST SERPL-CCNC: 25 U/L (ref 5–32)
BILIRUB SERPL-MCNC: 0.6 MG/DL (ref 0.2–1.2)
BUN BLDV-MCNC: 11 MG/DL (ref 6–20)
CALCIUM SERPL-MCNC: 8.9 MG/DL (ref 8.6–10)
CHLORIDE BLD-SCNC: 100 MMOL/L (ref 98–111)
CHOLESTEROL, TOTAL: 163 MG/DL (ref 160–199)
CO2: 28 MMOL/L (ref 22–29)
CREAT SERPL-MCNC: 0.6 MG/DL (ref 0.5–0.9)
CREATININE URINE: 86.8 MG/DL (ref 4.2–622)
GFR NON-AFRICAN AMERICAN: >60
GLUCOSE BLD-MCNC: 165 MG/DL (ref 74–109)
HBA1C MFR BLD: 10.1 % (ref 4–6)
HCT VFR BLD CALC: 44.6 % (ref 37–47)
HDLC SERPL-MCNC: 66 MG/DL (ref 65–121)
HEMOGLOBIN: 14.3 G/DL (ref 12–16)
LDL CHOLESTEROL CALCULATED: 76 MG/DL
MCH RBC QN AUTO: 29.5 PG (ref 27–31)
MCHC RBC AUTO-ENTMCNC: 32.1 G/DL (ref 33–37)
MCV RBC AUTO: 92 FL (ref 81–99)
MICROALBUMIN UR-MCNC: 1.8 MG/DL (ref 0–19)
MICROALBUMIN/CREAT UR-RTO: 20.7 MG/G
PDW BLD-RTO: 12.9 % (ref 11.5–14.5)
PLATELET # BLD: 310 K/UL (ref 130–400)
PMV BLD AUTO: 9.8 FL (ref 9.4–12.3)
POTASSIUM SERPL-SCNC: 4.4 MMOL/L (ref 3.5–5)
RBC # BLD: 4.85 M/UL (ref 4.2–5.4)
SODIUM BLD-SCNC: 143 MMOL/L (ref 136–145)
TOTAL PROTEIN: 7.2 G/DL (ref 6.6–8.7)
TRIGL SERPL-MCNC: 104 MG/DL (ref 0–149)
TSH SERPL DL<=0.05 MIU/L-ACNC: 4.06 UIU/ML (ref 0.27–4.2)
WBC # BLD: 7.2 K/UL (ref 4.8–10.8)

## 2019-03-01 PROCEDURE — G8427 DOCREV CUR MEDS BY ELIG CLIN: HCPCS | Performed by: INTERNAL MEDICINE

## 2019-03-01 PROCEDURE — G8484 FLU IMMUNIZE NO ADMIN: HCPCS | Performed by: INTERNAL MEDICINE

## 2019-03-01 PROCEDURE — 99213 OFFICE O/P EST LOW 20 MIN: CPT | Performed by: INTERNAL MEDICINE

## 2019-03-01 PROCEDURE — 2022F DILAT RTA XM EVC RTNOPTHY: CPT | Performed by: INTERNAL MEDICINE

## 2019-03-01 PROCEDURE — 3046F HEMOGLOBIN A1C LEVEL >9.0%: CPT | Performed by: INTERNAL MEDICINE

## 2019-03-01 PROCEDURE — G8417 CALC BMI ABV UP PARAM F/U: HCPCS | Performed by: INTERNAL MEDICINE

## 2019-03-01 PROCEDURE — G0439 PPPS, SUBSEQ VISIT: HCPCS | Performed by: INTERNAL MEDICINE

## 2019-03-01 PROCEDURE — 3017F COLORECTAL CA SCREEN DOC REV: CPT | Performed by: INTERNAL MEDICINE

## 2019-03-01 PROCEDURE — 1036F TOBACCO NON-USER: CPT | Performed by: INTERNAL MEDICINE

## 2019-03-01 ASSESSMENT — PATIENT HEALTH QUESTIONNAIRE - PHQ9
SUM OF ALL RESPONSES TO PHQ QUESTIONS 1-9: 0
SUM OF ALL RESPONSES TO PHQ QUESTIONS 1-9: 0

## 2019-03-01 ASSESSMENT — LIFESTYLE VARIABLES: HOW OFTEN DO YOU HAVE A DRINK CONTAINING ALCOHOL: 0

## 2019-03-10 PROBLEM — Z00.00 MEDICARE ANNUAL WELLNESS VISIT, SUBSEQUENT: Status: ACTIVE | Noted: 2019-03-10

## 2019-03-10 ASSESSMENT — ENCOUNTER SYMPTOMS
ABDOMINAL PAIN: 0
SHORTNESS OF BREATH: 0
COUGH: 0
EYE REDNESS: 0
ABDOMINAL DISTENTION: 0
SINUS PRESSURE: 0
BACK PAIN: 0
EYE DISCHARGE: 0

## 2019-03-11 DIAGNOSIS — M15.9 GENERALIZED OSTEOARTHRITIS: ICD-10-CM

## 2019-03-11 RX ORDER — MELOXICAM 15 MG/1
TABLET ORAL
Qty: 90 TABLET | Refills: 3 | Status: SHIPPED | OUTPATIENT
Start: 2019-03-11 | End: 2020-04-13

## 2019-03-22 DIAGNOSIS — F51.01 PRIMARY INSOMNIA: ICD-10-CM

## 2019-03-22 RX ORDER — ZOLPIDEM TARTRATE 10 MG/1
10 TABLET ORAL NIGHTLY PRN
Qty: 30 TABLET | Refills: 2 | Status: SHIPPED | OUTPATIENT
Start: 2019-03-22 | End: 2019-09-06 | Stop reason: SDUPTHER

## 2019-04-09 PROBLEM — Z00.00 MEDICARE ANNUAL WELLNESS VISIT, SUBSEQUENT: Status: RESOLVED | Noted: 2019-03-10 | Resolved: 2019-04-09

## 2019-07-22 RX ORDER — METOPROLOL TARTRATE 50 MG/1
TABLET, FILM COATED ORAL
Qty: 180 TABLET | Refills: 3 | Status: SHIPPED | OUTPATIENT
Start: 2019-07-22 | End: 2020-07-29

## 2019-08-05 DIAGNOSIS — Z79.4 TYPE 2 DIABETES MELLITUS WITHOUT COMPLICATION, WITH LONG-TERM CURRENT USE OF INSULIN (HCC): ICD-10-CM

## 2019-08-05 DIAGNOSIS — Z79.4 TYPE 2 DIABETES MELLITUS WITHOUT COMPLICATION, WITH LONG-TERM CURRENT USE OF INSULIN (HCC): Primary | ICD-10-CM

## 2019-08-05 DIAGNOSIS — E11.9 TYPE 2 DIABETES MELLITUS WITHOUT COMPLICATION, WITH LONG-TERM CURRENT USE OF INSULIN (HCC): Primary | ICD-10-CM

## 2019-08-05 DIAGNOSIS — E11.9 TYPE 2 DIABETES MELLITUS WITHOUT COMPLICATION, WITH LONG-TERM CURRENT USE OF INSULIN (HCC): ICD-10-CM

## 2019-08-05 LAB
ALBUMIN SERPL-MCNC: 4 G/DL (ref 3.5–5.2)
ALP BLD-CCNC: 118 U/L (ref 35–104)
ALT SERPL-CCNC: 27 U/L (ref 5–33)
ANION GAP SERPL CALCULATED.3IONS-SCNC: 17 MMOL/L (ref 7–19)
AST SERPL-CCNC: 39 U/L (ref 5–32)
BILIRUB SERPL-MCNC: 0.5 MG/DL (ref 0.2–1.2)
BUN BLDV-MCNC: 11 MG/DL (ref 6–20)
CALCIUM SERPL-MCNC: 9.1 MG/DL (ref 8.6–10)
CHLORIDE BLD-SCNC: 100 MMOL/L (ref 98–111)
CO2: 26 MMOL/L (ref 22–29)
CREAT SERPL-MCNC: 0.6 MG/DL (ref 0.5–0.9)
GFR NON-AFRICAN AMERICAN: >60
GLUCOSE BLD-MCNC: 220 MG/DL (ref 74–109)
HBA1C MFR BLD: 9.9 % (ref 4–6)
POTASSIUM SERPL-SCNC: 4.5 MMOL/L (ref 3.5–5)
SODIUM BLD-SCNC: 143 MMOL/L (ref 136–145)
TOTAL PROTEIN: 7.4 G/DL (ref 6.6–8.7)

## 2019-09-05 DIAGNOSIS — F51.01 PRIMARY INSOMNIA: ICD-10-CM

## 2019-09-06 RX ORDER — ZOLPIDEM TARTRATE 10 MG/1
10 TABLET ORAL NIGHTLY PRN
Qty: 30 TABLET | Refills: 2 | Status: SHIPPED | OUTPATIENT
Start: 2019-09-06 | End: 2020-02-21

## 2019-12-18 DIAGNOSIS — E11.9 TYPE 2 DIABETES MELLITUS WITHOUT COMPLICATION, WITH LONG-TERM CURRENT USE OF INSULIN (HCC): Primary | ICD-10-CM

## 2019-12-18 DIAGNOSIS — Z79.4 TYPE 2 DIABETES MELLITUS WITHOUT COMPLICATION, WITH LONG-TERM CURRENT USE OF INSULIN (HCC): Primary | ICD-10-CM

## 2019-12-18 DIAGNOSIS — E11.9 TYPE 2 DIABETES MELLITUS WITHOUT COMPLICATION, WITH LONG-TERM CURRENT USE OF INSULIN (HCC): ICD-10-CM

## 2019-12-18 DIAGNOSIS — Z79.4 TYPE 2 DIABETES MELLITUS WITHOUT COMPLICATION, WITH LONG-TERM CURRENT USE OF INSULIN (HCC): ICD-10-CM

## 2019-12-18 LAB
ALBUMIN SERPL-MCNC: 4 G/DL (ref 3.5–5.2)
ALP BLD-CCNC: 115 U/L (ref 35–104)
ALT SERPL-CCNC: 24 U/L (ref 5–33)
ANION GAP SERPL CALCULATED.3IONS-SCNC: 14 MMOL/L (ref 7–19)
AST SERPL-CCNC: 27 U/L (ref 5–32)
BILIRUB SERPL-MCNC: 0.3 MG/DL (ref 0.2–1.2)
BUN BLDV-MCNC: 15 MG/DL (ref 6–20)
CALCIUM SERPL-MCNC: 9 MG/DL (ref 8.6–10)
CHLORIDE BLD-SCNC: 98 MMOL/L (ref 98–111)
CHOLESTEROL, TOTAL: 176 MG/DL (ref 160–199)
CO2: 29 MMOL/L (ref 22–29)
CREAT SERPL-MCNC: 0.6 MG/DL (ref 0.5–0.9)
GFR NON-AFRICAN AMERICAN: >60
GLUCOSE BLD-MCNC: 121 MG/DL (ref 74–109)
HBA1C MFR BLD: 11.3 % (ref 4–6)
HCT VFR BLD CALC: 46.1 % (ref 37–47)
HDLC SERPL-MCNC: 61 MG/DL (ref 65–121)
HEMOGLOBIN: 14.8 G/DL (ref 12–16)
LDL CHOLESTEROL CALCULATED: 93 MG/DL
MCH RBC QN AUTO: 30.1 PG (ref 27–31)
MCHC RBC AUTO-ENTMCNC: 32.1 G/DL (ref 33–37)
MCV RBC AUTO: 93.9 FL (ref 81–99)
PDW BLD-RTO: 13 % (ref 11.5–14.5)
PLATELET # BLD: 308 K/UL (ref 130–400)
PMV BLD AUTO: 9.9 FL (ref 9.4–12.3)
POTASSIUM SERPL-SCNC: 3.9 MMOL/L (ref 3.5–5)
RBC # BLD: 4.91 M/UL (ref 4.2–5.4)
SODIUM BLD-SCNC: 141 MMOL/L (ref 136–145)
TOTAL PROTEIN: 7.2 G/DL (ref 6.6–8.7)
TRIGL SERPL-MCNC: 110 MG/DL (ref 0–149)
TSH SERPL DL<=0.05 MIU/L-ACNC: 12.83 UIU/ML (ref 0.27–4.2)
WBC # BLD: 7.8 K/UL (ref 4.8–10.8)

## 2019-12-19 ENCOUNTER — OFFICE VISIT (OUTPATIENT)
Dept: INTERNAL MEDICINE | Age: 59
End: 2019-12-19
Payer: MEDICARE

## 2019-12-19 ENCOUNTER — CARE COORDINATION (OUTPATIENT)
Dept: CARE COORDINATION | Age: 59
End: 2019-12-19

## 2019-12-19 VITALS — DIASTOLIC BLOOD PRESSURE: 60 MMHG | OXYGEN SATURATION: 98 % | SYSTOLIC BLOOD PRESSURE: 120 MMHG | HEART RATE: 86 BPM

## 2019-12-19 DIAGNOSIS — E78.2 MIXED HYPERLIPIDEMIA: ICD-10-CM

## 2019-12-19 DIAGNOSIS — Z79.4 TYPE 2 DIABETES MELLITUS WITHOUT COMPLICATION, WITH LONG-TERM CURRENT USE OF INSULIN (HCC): Primary | ICD-10-CM

## 2019-12-19 DIAGNOSIS — I10 ESSENTIAL HYPERTENSION: ICD-10-CM

## 2019-12-19 DIAGNOSIS — E11.9 TYPE 2 DIABETES MELLITUS WITHOUT COMPLICATION, WITH LONG-TERM CURRENT USE OF INSULIN (HCC): Primary | ICD-10-CM

## 2019-12-19 DIAGNOSIS — E03.9 ACQUIRED HYPOTHYROIDISM: ICD-10-CM

## 2019-12-19 DIAGNOSIS — E66.01 MORBID OBESITY DUE TO EXCESS CALORIES (HCC): ICD-10-CM

## 2019-12-19 PROCEDURE — G8427 DOCREV CUR MEDS BY ELIG CLIN: HCPCS | Performed by: INTERNAL MEDICINE

## 2019-12-19 PROCEDURE — 2022F DILAT RTA XM EVC RTNOPTHY: CPT | Performed by: INTERNAL MEDICINE

## 2019-12-19 PROCEDURE — 3017F COLORECTAL CA SCREEN DOC REV: CPT | Performed by: INTERNAL MEDICINE

## 2019-12-19 PROCEDURE — 1036F TOBACCO NON-USER: CPT | Performed by: INTERNAL MEDICINE

## 2019-12-19 PROCEDURE — 3046F HEMOGLOBIN A1C LEVEL >9.0%: CPT | Performed by: INTERNAL MEDICINE

## 2019-12-19 PROCEDURE — 99214 OFFICE O/P EST MOD 30 MIN: CPT | Performed by: INTERNAL MEDICINE

## 2019-12-19 PROCEDURE — G8484 FLU IMMUNIZE NO ADMIN: HCPCS | Performed by: INTERNAL MEDICINE

## 2019-12-19 PROCEDURE — G8417 CALC BMI ABV UP PARAM F/U: HCPCS | Performed by: INTERNAL MEDICINE

## 2019-12-19 RX ORDER — OMEPRAZOLE 20 MG/1
20 CAPSULE, DELAYED RELEASE ORAL
Qty: 90 CAPSULE | Refills: 3 | Status: SHIPPED | OUTPATIENT
Start: 2019-12-19 | End: 2020-12-20 | Stop reason: CLARIF

## 2019-12-19 RX ORDER — LEVOTHYROXINE SODIUM 0.2 MG/1
200 TABLET ORAL DAILY
Qty: 90 TABLET | Refills: 3 | Status: SHIPPED | OUTPATIENT
Start: 2019-12-19 | End: 2021-01-29 | Stop reason: SDUPTHER

## 2019-12-19 NOTE — CARE COORDINATION
Telephoned the patient. Told her she was in the Gap phase of prescription coverage. Explained  assistance to her. Told her it is too late this year to ask for help, but she needs to keep  assistance in mind when she pays $1,000 in out-of-pocket prescription expenses. Once she hits that dollar amount, she can apply for help from the pharmaceutical companies. Patient verbalized understanding.     Submitted by Rafaela/ROYA

## 2019-12-19 NOTE — CARE COORDINATION
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 [facility] says not covered but shows tier 2 for tressiba and tier 3 ozempic.  Needs help with her meds. Can you also send this to  also. Submitted by Rafaela/ROYA    Telephoned the patient regarding medication assistance. Patient told me Medicare pays for her prescriptions. I asked for the name of her Part D carrier. She told me Medicare. I explained to her that Medicare has commercial payers cover prescription costs. She told me \"it must be [employer]. \"  I asked if her  was still working. She said no. I asked again if she had a Part D plan. She said Medicare. I asked the patient to take her insurance cards out of her wallet so she could review them with me. Patient stated she had a [employer] card. I told her that is her Part D coverage if it is still active. I, also, asked the patient to have the  staff to scan the Part D card in her medical record at her next appointment. Reviewed [employer]'s drug formulary. Nesha Cons and Ozempic are covered - Tier 3. Patient stated she has never given her pharmacy the [address] card. I asked her to take the card to the pharmacy. Ask them to process her orders for these two injectables. I gave her my name and number. Asked her to call me if she has any difficulty. She stated she would.     Submitted by Rafaela/ROYA     Telephoned [facility]. Spoke with [place]. He stated they have been billing [employer] Part D. Patient is in the donut hole. Her cost is $154 for one injectable.      Submitted by Rafaela/ROYA

## 2019-12-29 ASSESSMENT — ENCOUNTER SYMPTOMS
SINUS PRESSURE: 0
BACK PAIN: 0
ABDOMINAL PAIN: 0
EYE DISCHARGE: 0
EYE REDNESS: 0
ABDOMINAL DISTENTION: 0
COUGH: 0
SHORTNESS OF BREATH: 1

## 2020-01-02 ENCOUNTER — CARE COORDINATION (OUTPATIENT)
Dept: CARE COORDINATION | Age: 60
End: 2020-01-02

## 2020-01-02 NOTE — CARE COORDINATION
Attempted to reach pt via phone call to discuss DM and HTN mgmt, per PCP referral.  No answer, no VM option.   Electronically signed by Boogie Ortega RN on 1/2/2020 at 11:46 AM

## 2020-01-22 ENCOUNTER — CARE COORDINATION (OUTPATIENT)
Dept: CARE COORDINATION | Age: 60
End: 2020-01-22

## 2020-01-22 NOTE — CARE COORDINATION
Attempted to reach pt via phone call to offer ACM support for DM and HTN at PCP's request.  No answer. No VM option. Attempt #2.   Electronically signed by Leona Gitelman, RN on 1/22/2020 at 3:37 PM

## 2020-01-29 ENCOUNTER — CARE COORDINATION (OUTPATIENT)
Dept: CARE COORDINATION | Age: 60
End: 2020-01-29

## 2020-01-29 NOTE — CARE COORDINATION
Attempted to reach pt via phone call to enroll in CC per PCP's request for DM and HTN mgmt, compliance. No answer. No VM option. Call #3.   Electronically signed by Paulina Luciano RN on 1/29/2020 at 1:40 PM

## 2020-02-05 ENCOUNTER — CARE COORDINATION (OUTPATIENT)
Dept: CARE COORDINATION | Age: 60
End: 2020-02-05

## 2020-02-05 NOTE — CARE COORDINATION
Attempted to reach pt via phone call today to offer ACM support for DM, HTN mgmt and med compliance per PCP's request.  Automated response \"The wireless customer you are calling is not available. \"  No VM option. Call #4. Will send CC letter and stop calls at this time. Pt has an upcoming appt on 2/10/20 at 11:00 AM.  Will put her on my schedule and try to catch her at that time to offer support. Letter sent, see Letters.   Electronically signed by Tan Bonner RN on 2/5/2020 at 2:32 PM

## 2020-02-10 ENCOUNTER — CARE COORDINATION (OUTPATIENT)
Dept: CARE COORDINATION | Age: 60
End: 2020-02-10

## 2020-02-10 ENCOUNTER — OFFICE VISIT (OUTPATIENT)
Dept: INTERNAL MEDICINE | Age: 60
End: 2020-02-10
Payer: MEDICARE

## 2020-02-10 VITALS
OXYGEN SATURATION: 98 % | HEART RATE: 100 BPM | HEIGHT: 67 IN | WEIGHT: 293 LBS | DIASTOLIC BLOOD PRESSURE: 80 MMHG | SYSTOLIC BLOOD PRESSURE: 132 MMHG | BODY MASS INDEX: 45.99 KG/M2

## 2020-02-10 PROCEDURE — G8427 DOCREV CUR MEDS BY ELIG CLIN: HCPCS | Performed by: INTERNAL MEDICINE

## 2020-02-10 PROCEDURE — G8484 FLU IMMUNIZE NO ADMIN: HCPCS | Performed by: INTERNAL MEDICINE

## 2020-02-10 PROCEDURE — 99214 OFFICE O/P EST MOD 30 MIN: CPT | Performed by: INTERNAL MEDICINE

## 2020-02-10 PROCEDURE — G8417 CALC BMI ABV UP PARAM F/U: HCPCS | Performed by: INTERNAL MEDICINE

## 2020-02-10 PROCEDURE — 3046F HEMOGLOBIN A1C LEVEL >9.0%: CPT | Performed by: INTERNAL MEDICINE

## 2020-02-10 PROCEDURE — 3017F COLORECTAL CA SCREEN DOC REV: CPT | Performed by: INTERNAL MEDICINE

## 2020-02-10 PROCEDURE — 2022F DILAT RTA XM EVC RTNOPTHY: CPT | Performed by: INTERNAL MEDICINE

## 2020-02-10 PROCEDURE — 1036F TOBACCO NON-USER: CPT | Performed by: INTERNAL MEDICINE

## 2020-02-10 ASSESSMENT — PATIENT HEALTH QUESTIONNAIRE - PHQ9
SUM OF ALL RESPONSES TO PHQ QUESTIONS 1-9: 0
2. FEELING DOWN, DEPRESSED OR HOPELESS: 0
1. LITTLE INTEREST OR PLEASURE IN DOING THINGS: 0
SUM OF ALL RESPONSES TO PHQ9 QUESTIONS 1 & 2: 0
SUM OF ALL RESPONSES TO PHQ QUESTIONS 1-9: 0

## 2020-02-10 NOTE — PROGRESS NOTES
units daily 9 vial 3    glucose blood VI test strips (FREESTYLE LITE) strip 1 each by In Vitro route 3 times daily As needed. 300 each 3     No current facility-administered medications for this visit. Review of Systems   Constitutional: Positive for fatigue. Negative for chills and fever. HENT: Negative for congestion and sinus pressure. Eyes: Negative for discharge and redness. Respiratory: Negative for cough and shortness of breath. Cardiovascular: Positive for leg swelling. Negative for chest pain and palpitations. Gastrointestinal: Negative for abdominal distention and abdominal pain. Genitourinary: Negative for dysuria, frequency and urgency. Musculoskeletal: Positive for arthralgias. Negative for back pain. Skin: Negative for rash and wound. Neurological: Negative for dizziness, light-headedness and headaches. Psychiatric/Behavioral: Positive for dysphoric mood and sleep disturbance. The patient is not nervous/anxious. /80 (Site: Left Upper Arm)   Pulse 100   Ht 5' 7\" (1.702 m)   Wt (!) 317 lb (143.8 kg)   SpO2 98%   BMI 49.65 kg/m²   BP Readings from Last 7 Encounters:   02/10/20 132/80   12/19/19 120/60   03/01/19 138/80   06/22/18 136/82   03/21/18 130/74   03/15/18 116/82   12/28/17 138/80     Wt Readings from Last 7 Encounters:   02/10/20 (!) 317 lb (143.8 kg)   03/01/19 (!) 317 lb (143.8 kg)   06/22/18 (!) 313 lb (142 kg)   03/21/18 (!) 308 lb (139.7 kg)   03/15/18 (!) 320 lb (145.2 kg)   12/28/17 (!) 303 lb 4 oz (137.6 kg)   12/12/17 (!) 306 lb (138.8 kg)     BMI Readings from Last 7 Encounters:   02/10/20 49.65 kg/m²   03/01/19 49.65 kg/m²   06/22/18 49.02 kg/m²   03/21/18 48.24 kg/m²   03/15/18 50.12 kg/m²   12/28/17 47.50 kg/m²   12/12/17 47.93 kg/m²     Resp Readings from Last 7 Encounters:   03/15/18 20   12/28/17 20   11/20/16 18   10/20/16 18   09/24/16 20       Physical Exam  Constitutional:       General: She is not in acute distress. insulin ([address]  Absolutely terrible control we need to get help to get her [place] and Ozempic we checked a number of things talked with care coordinator  trying to get this arranged got her set up to go to [facility] [place] [facility] to help with medications. Going to focus first on getting her Ozempic proceed from there  - Comprehensive Metabolic Panel; Future  - Hemoglobin A1C; Future  - Lipid Panel; Future  - TSH without Reflex; Future  - Microalbumin / Creatinine Urine Ratio; Future    2. Morbid obesity due to excess calories ([address]  Need to get her medical regimen altered to help with weight loss    3. Essential hypertension  Pressure stable and follow    4.  Mixed hyperlipidemia  Continue current care    Greater than 25 minutes spent

## 2020-02-10 NOTE — CARE COORDINATION
Rising Risk  Referral from Primary Care Provider:  No  Week 1 - Initial Assessment     Do you have all of your prescriptions and are they filled?:  No  Barriers to medication adherence:  Does not feel there is a need/No perceived effect  Are you able to afford your medications?:  No  How often do you have trouble taking your medications the way you have been told to take them?:  Sometimes I take them as prescribed. Do you have Home O2 Therapy?:  No      Ability to seek help/take action for Emergent Urgent situations i.e. fire, crime, inclement weather or health crisis. :  Independent  Ability to ambulate to restroom:  Independent  Ability handle personal hygeine needs (bathing/dressing/grooming): Independent  Ability to manage Medications: Independent  Ability to prepare Food Preparation:  Independent  Ability to maintain home (clean home, laundry): Independent  Ability to drive and/or has transportation:  Independent  Ability to do shopping:  Independent  Ability to manage finances: Independent  Is patient able to live independently?:  Yes     Current Housing:  Private Residence  For whom are you the caregiver?:  Grandkids several days/week                 Suggested Interventions and Community Resources  Diabetes Education: In Process (Comment: 2/10/20: Enc to eat regularly timed meals/snacks; glucerna/coupons provided.)    Other Services or Interventions:  2/10/20: Referred to ME[facility]; medication assistance from [place], for Extra Help gabi   Zone Management Tools: In Process         Set up/Review Goals, Set up/Review an Education Plan              Prior to Admission medications    Medication Sig Start Date End Date Taking?  Authorizing Provider   Semaglutide,0.25 or 0.5MG/DOS, (OZEMPIC, 0.25 OR 0.5 MG/DOSE,) 2 MG/1.5ML SOPN 0.25mg sq weekly for 4 weeks and then 0.5mg sq weekly 12/19/19   Jones Madrigal MD   omeprazole (PRILOSEC) 20 MG delayed release capsule Take 1 capsule by mouth every morning (before breakfast) 12/19/19   Americo Izquierdo MD   levothyroxine (SYNTHROID) 200 MCG tablet Take 1 tablet by mouth daily 12/19/19   Americo Izquierdo MD   zolpidem (AMBIEN) 10 MG tablet Take 1 tablet by mouth nightly as needed for Sleep for up to 30 days. 9/6/19 10/6/19  Americo Izquierdo MD   metoprolol tartrate (LOPRESSOR) 50 MG tablet TAKE 1 TABLET BY MOUTH TWICE DAILY 7/22/19   Americo Izquierdo MD   silver sulfADIAZINE (SILVADENE) 1 % cream Apply topically daily. 4/29/19   Americo Izquierdo MD   meloxicam (MOBIC) 15 MG tablet TAKE ONE TABLET BY MOUTH ONCE DAILY 3/11/19   Americo Izquierdo MD   DULoxetine (CYMBALTA) 30 MG extended release capsule TAKE ONE CAPSULE BY MOUTH ONCE DAILY 2/8/19   Americo Izquierdo MD   simvastatin (ZOCOR) 80 MG tablet TAKE ONE TABLET BY MOUTH ONCE DAILY 1/23/19   Americo Izquierdo MD   metFORMIN (GLUCOPHAGE) 1000 MG tablet TAKE ONE TABLET BY MOUTH TWICE DAILY WITH FOOD 1/23/19 12/19/19  Americo Izquierdo MD   NOVOLIN R 100 UNIT/ML injection Sliding scale,  Use as directed - up to 150 units daily 4/27/18   Americo Izquierdo MD   glucose blood VI test strips (FREESTYLE LITE) strip 1 each by In Vitro route 3 times daily As needed. 1/10/18   Americo Izquierdo MD       Future Appointments   Date Time Provider Jessica Mendozai   5/15/2020  9:30 AM [facility]      and   Diabetes Assessment    Meal Planning:  None   How often do you test your blood sugar?:  Meals, Other (Comment: 5-6x daily)   Do you have barriers with adherence to non-pharmacologic self-management interventions?  (Nutrition/Exercise/Self-Monitoring):  Yes       Blood Sugar Monitoring Regimen:  Before Meals, At Bedtime, Morning Fasting   Blood Sugar Trends:  Fluctuating

## 2020-02-20 ENCOUNTER — TELEPHONE (OUTPATIENT)
Dept: INTERNAL MEDICINE | Age: 60
End: 2020-02-20

## 2020-02-20 ASSESSMENT — ENCOUNTER SYMPTOMS
COUGH: 0
ABDOMINAL DISTENTION: 0
ABDOMINAL PAIN: 0
EYE REDNESS: 0
SINUS PRESSURE: 0
EYE DISCHARGE: 0
SHORTNESS OF BREATH: 0
BACK PAIN: 0

## 2020-02-20 NOTE — TELEPHONE ENCOUNTER
[facility] needs Ozempic and [place] changed to [place] and she can get those free th [facility].   They just need scripts faxed to them at [phone]

## 2020-02-21 RX ORDER — ZOLPIDEM TARTRATE 10 MG/1
TABLET ORAL
Qty: 30 TABLET | Status: CANCELLED | OUTPATIENT
Start: 2020-02-21

## 2020-02-21 RX ORDER — ZOLPIDEM TARTRATE 10 MG/1
TABLET ORAL
Qty: 90 TABLET | Refills: 0 | Status: SHIPPED | OUTPATIENT
Start: 2020-02-21 | End: 2020-09-14

## 2020-02-26 NOTE — TELEPHONE ENCOUNTER
I printed these out at the station - make sure stop NHP insulin and stop ozempic and dont take tresiba   Be cautious with her sliding scale insulin also

## 2020-03-05 RX ORDER — DULOXETIN HYDROCHLORIDE 30 MG/1
CAPSULE, DELAYED RELEASE ORAL
Qty: 90 CAPSULE | Refills: 3 | Status: SHIPPED | OUTPATIENT
Start: 2020-03-05 | End: 2021-04-27

## 2020-04-07 RX ORDER — DULAGLUTIDE 0.75 MG/.5ML
0.75 INJECTION, SOLUTION SUBCUTANEOUS WEEKLY
Qty: 12 PEN | Refills: 3 | Status: SHIPPED | OUTPATIENT
Start: 2020-04-07 | End: 2020-12-20 | Stop reason: CLARIF

## 2020-04-07 RX ORDER — INSULIN GLARGINE 100 [IU]/ML
70 INJECTION, SOLUTION SUBCUTANEOUS NIGHTLY
Qty: 12 PEN | Refills: 3 | Status: SHIPPED | OUTPATIENT
Start: 2020-04-07 | End: 2020-12-20 | Stop reason: CLARIF

## 2020-04-13 RX ORDER — MELOXICAM 15 MG/1
TABLET ORAL
Qty: 90 TABLET | Refills: 0 | Status: SHIPPED | OUTPATIENT
Start: 2020-04-13 | End: 2020-07-29

## 2020-07-29 RX ORDER — METOPROLOL TARTRATE 50 MG/1
TABLET, FILM COATED ORAL
Qty: 180 TABLET | Refills: 3 | Status: SHIPPED | OUTPATIENT
Start: 2020-07-29 | End: 2020-12-20 | Stop reason: CLARIF

## 2020-07-29 RX ORDER — MELOXICAM 15 MG/1
TABLET ORAL
Qty: 90 TABLET | Refills: 3 | Status: SHIPPED | OUTPATIENT
Start: 2020-07-29 | End: 2021-10-26

## 2020-09-14 RX ORDER — ZOLPIDEM TARTRATE 10 MG/1
TABLET ORAL
Qty: 90 TABLET | Refills: 0 | Status: SHIPPED | OUTPATIENT
Start: 2020-09-14 | End: 2020-12-28 | Stop reason: SDUPTHER

## 2020-10-01 ENCOUNTER — CARE COORDINATION (OUTPATIENT)
Dept: CARE COORDINATION | Age: 60
End: 2020-10-01

## 2020-10-12 NOTE — CARE COORDINATION
Attempted to reach pt via phone call for CC follow-up. No answer. No VM option. Will close episode at this time. ACM is available for any future pt needs.

## 2020-12-20 ENCOUNTER — APPOINTMENT (OUTPATIENT)
Dept: ULTRASOUND IMAGING | Age: 60
DRG: 638 | End: 2020-12-20
Payer: MEDICARE

## 2020-12-20 ENCOUNTER — OFFICE VISIT (OUTPATIENT)
Dept: URGENT CARE | Age: 60
End: 2020-12-20

## 2020-12-20 ENCOUNTER — HOSPITAL ENCOUNTER (INPATIENT)
Age: 60
LOS: 1 days | Discharge: HOME HEALTH | DRG: 638 | End: 2020-12-21
Attending: PEDIATRICS | Admitting: FAMILY MEDICINE
Payer: MEDICARE

## 2020-12-20 VITALS
RESPIRATION RATE: 20 BRPM | SYSTOLIC BLOOD PRESSURE: 131 MMHG | OXYGEN SATURATION: 95 % | TEMPERATURE: 97.6 F | HEART RATE: 93 BPM | BODY MASS INDEX: 46.83 KG/M2 | WEIGHT: 293 LBS | DIASTOLIC BLOOD PRESSURE: 75 MMHG

## 2020-12-20 PROBLEM — L02.214 ABSCESS OF RIGHT GROIN: Status: ACTIVE | Noted: 2020-12-20

## 2020-12-20 LAB
ALBUMIN SERPL-MCNC: 3.9 G/DL (ref 3.5–5.2)
ALP BLD-CCNC: 114 U/L (ref 35–104)
ALT SERPL-CCNC: 14 U/L (ref 5–33)
ANION GAP SERPL CALCULATED.3IONS-SCNC: 13 MMOL/L (ref 7–19)
AST SERPL-CCNC: 20 U/L (ref 5–32)
BASOPHILS ABSOLUTE: 0 K/UL (ref 0–0.2)
BASOPHILS RELATIVE PERCENT: 0.4 % (ref 0–1)
BILIRUB SERPL-MCNC: 0.6 MG/DL (ref 0.2–1.2)
BUN BLDV-MCNC: 7 MG/DL (ref 8–23)
C-REACTIVE PROTEIN: 6.85 MG/DL (ref 0–0.5)
CALCIUM SERPL-MCNC: 9 MG/DL (ref 8.8–10.2)
CHLORIDE BLD-SCNC: 96 MMOL/L (ref 98–111)
CHP ED QC CHECK: YES
CO2: 25 MMOL/L (ref 22–29)
CREAT SERPL-MCNC: 0.5 MG/DL (ref 0.5–0.9)
EOSINOPHILS ABSOLUTE: 0.1 K/UL (ref 0–0.6)
EOSINOPHILS RELATIVE PERCENT: 0.9 % (ref 0–5)
GFR AFRICAN AMERICAN: >59
GFR NON-AFRICAN AMERICAN: >60
GLUCOSE BLD-MCNC: 239 MG/DL
GLUCOSE BLD-MCNC: 239 MG/DL (ref 70–99)
GLUCOSE BLD-MCNC: 321 MG/DL (ref 70–99)
GLUCOSE BLD-MCNC: 339 MG/DL (ref 74–109)
HBA1C MFR BLD: 11.4 % (ref 4–6)
HCT VFR BLD CALC: 43.9 % (ref 37–47)
HEMOGLOBIN: 14.4 G/DL (ref 12–16)
IMMATURE GRANULOCYTES #: 0 K/UL
LYMPHOCYTES ABSOLUTE: 2.3 K/UL (ref 1.1–4.5)
LYMPHOCYTES RELATIVE PERCENT: 21 % (ref 20–40)
MCH RBC QN AUTO: 30.3 PG (ref 27–31)
MCHC RBC AUTO-ENTMCNC: 32.8 G/DL (ref 33–37)
MCV RBC AUTO: 92.2 FL (ref 81–99)
MONOCYTES ABSOLUTE: 1 K/UL (ref 0–0.9)
MONOCYTES RELATIVE PERCENT: 8.8 % (ref 0–10)
NEUTROPHILS ABSOLUTE: 7.6 K/UL (ref 1.5–7.5)
NEUTROPHILS RELATIVE PERCENT: 68.7 % (ref 50–65)
PDW BLD-RTO: 13.1 % (ref 11.5–14.5)
PERFORMED ON: ABNORMAL
PERFORMED ON: ABNORMAL
PLATELET # BLD: 374 K/UL (ref 130–400)
PMV BLD AUTO: 9.5 FL (ref 9.4–12.3)
POTASSIUM REFLEX MAGNESIUM: 4 MMOL/L (ref 3.5–5)
RBC # BLD: 4.76 M/UL (ref 4.2–5.4)
SEDIMENTATION RATE, ERYTHROCYTE: 32 MM/HR (ref 0–25)
SODIUM BLD-SCNC: 134 MMOL/L (ref 136–145)
TOTAL PROTEIN: 7.3 G/DL (ref 6.6–8.7)
WBC # BLD: 11.1 K/UL (ref 4.8–10.8)

## 2020-12-20 PROCEDURE — 2580000003 HC RX 258: Performed by: FAMILY MEDICINE

## 2020-12-20 PROCEDURE — 0H9AXZZ DRAINAGE OF INGUINAL SKIN, EXTERNAL APPROACH: ICD-10-PCS | Performed by: PEDIATRICS

## 2020-12-20 PROCEDURE — 6370000000 HC RX 637 (ALT 250 FOR IP): Performed by: PEDIATRICS

## 2020-12-20 PROCEDURE — 99283 EMERGENCY DEPT VISIT LOW MDM: CPT

## 2020-12-20 PROCEDURE — 86140 C-REACTIVE PROTEIN: CPT

## 2020-12-20 PROCEDURE — 82947 ASSAY GLUCOSE BLOOD QUANT: CPT

## 2020-12-20 PROCEDURE — 36415 COLL VENOUS BLD VENIPUNCTURE: CPT

## 2020-12-20 PROCEDURE — 87205 SMEAR GRAM STAIN: CPT

## 2020-12-20 PROCEDURE — 85025 COMPLETE CBC W/AUTO DIFF WBC: CPT

## 2020-12-20 PROCEDURE — 96365 THER/PROPH/DIAG IV INF INIT: CPT

## 2020-12-20 PROCEDURE — 99999 PR OFFICE/OUTPT VISIT,PROCEDURE ONLY: CPT | Performed by: PEDIATRICS

## 2020-12-20 PROCEDURE — 2580000003 HC RX 258: Performed by: PEDIATRICS

## 2020-12-20 PROCEDURE — 96375 TX/PRO/DX INJ NEW DRUG ADDON: CPT

## 2020-12-20 PROCEDURE — 85652 RBC SED RATE AUTOMATED: CPT

## 2020-12-20 PROCEDURE — 87070 CULTURE OTHR SPECIMN AEROBIC: CPT

## 2020-12-20 PROCEDURE — 80053 COMPREHEN METABOLIC PANEL: CPT

## 2020-12-20 PROCEDURE — 2500000003 HC RX 250 WO HCPCS: Performed by: PEDIATRICS

## 2020-12-20 PROCEDURE — 1210000000 HC MED SURG R&B

## 2020-12-20 PROCEDURE — 83036 HEMOGLOBIN GLYCOSYLATED A1C: CPT

## 2020-12-20 PROCEDURE — 76882 US LMTD JT/FCL EVL NVASC XTR: CPT

## 2020-12-20 PROCEDURE — 6360000002 HC RX W HCPCS: Performed by: PEDIATRICS

## 2020-12-20 PROCEDURE — 10060 I&D ABSCESS SIMPLE/SINGLE: CPT

## 2020-12-20 RX ORDER — NICOTINE POLACRILEX 4 MG
15 LOZENGE BUCCAL PRN
Status: DISCONTINUED | OUTPATIENT
Start: 2020-12-20 | End: 2020-12-21 | Stop reason: HOSPADM

## 2020-12-20 RX ORDER — DEXTROSE MONOHYDRATE 50 MG/ML
100 INJECTION, SOLUTION INTRAVENOUS PRN
Status: DISCONTINUED | OUTPATIENT
Start: 2020-12-20 | End: 2020-12-21 | Stop reason: HOSPADM

## 2020-12-20 RX ORDER — SODIUM CHLORIDE 0.9 % (FLUSH) 0.9 %
10 SYRINGE (ML) INJECTION PRN
Status: DISCONTINUED | OUTPATIENT
Start: 2020-12-20 | End: 2020-12-21 | Stop reason: HOSPADM

## 2020-12-20 RX ORDER — SODIUM CHLORIDE 9 MG/ML
INJECTION, SOLUTION INTRAVENOUS CONTINUOUS
Status: DISCONTINUED | OUTPATIENT
Start: 2020-12-20 | End: 2020-12-21 | Stop reason: HOSPADM

## 2020-12-20 RX ORDER — HYDROCODONE BITARTRATE AND ACETAMINOPHEN 7.5; 325 MG/1; MG/1
1 TABLET ORAL EVERY 6 HOURS PRN
Status: DISCONTINUED | OUTPATIENT
Start: 2020-12-20 | End: 2020-12-21 | Stop reason: HOSPADM

## 2020-12-20 RX ORDER — DEXTROSE MONOHYDRATE 25 G/50ML
12.5 INJECTION, SOLUTION INTRAVENOUS PRN
Status: DISCONTINUED | OUTPATIENT
Start: 2020-12-20 | End: 2020-12-21 | Stop reason: HOSPADM

## 2020-12-20 RX ORDER — 0.9 % SODIUM CHLORIDE 0.9 %
500 INTRAVENOUS SOLUTION INTRAVENOUS ONCE
Status: COMPLETED | OUTPATIENT
Start: 2020-12-20 | End: 2020-12-20

## 2020-12-20 RX ORDER — SODIUM CHLORIDE 0.9 % (FLUSH) 0.9 %
10 SYRINGE (ML) INJECTION EVERY 12 HOURS SCHEDULED
Status: DISCONTINUED | OUTPATIENT
Start: 2020-12-20 | End: 2020-12-21 | Stop reason: HOSPADM

## 2020-12-20 RX ORDER — KETOROLAC TROMETHAMINE 30 MG/ML
15 INJECTION, SOLUTION INTRAMUSCULAR; INTRAVENOUS ONCE
Status: COMPLETED | OUTPATIENT
Start: 2020-12-20 | End: 2020-12-20

## 2020-12-20 RX ORDER — LIDOCAINE HYDROCHLORIDE 10 MG/ML
5 INJECTION, SOLUTION EPIDURAL; INFILTRATION; INTRACAUDAL; PERINEURAL ONCE
Status: COMPLETED | OUTPATIENT
Start: 2020-12-20 | End: 2020-12-20

## 2020-12-20 RX ORDER — ONDANSETRON 2 MG/ML
4 INJECTION INTRAMUSCULAR; INTRAVENOUS EVERY 6 HOURS PRN
Status: DISCONTINUED | OUTPATIENT
Start: 2020-12-20 | End: 2020-12-21 | Stop reason: HOSPADM

## 2020-12-20 RX ORDER — POLYETHYLENE GLYCOL 3350 17 G/17G
17 POWDER, FOR SOLUTION ORAL DAILY PRN
Status: DISCONTINUED | OUTPATIENT
Start: 2020-12-20 | End: 2020-12-21 | Stop reason: HOSPADM

## 2020-12-20 RX ORDER — INSULIN GLARGINE 100 [IU]/ML
30 INJECTION, SOLUTION SUBCUTANEOUS NIGHTLY
Status: DISCONTINUED | OUTPATIENT
Start: 2020-12-20 | End: 2020-12-21 | Stop reason: HOSPADM

## 2020-12-20 RX ORDER — ACETAMINOPHEN 650 MG/1
650 SUPPOSITORY RECTAL EVERY 6 HOURS PRN
Status: DISCONTINUED | OUTPATIENT
Start: 2020-12-20 | End: 2020-12-21 | Stop reason: HOSPADM

## 2020-12-20 RX ORDER — BUPIVACAINE HYDROCHLORIDE 5 MG/ML
30 INJECTION, SOLUTION EPIDURAL; INTRACAUDAL ONCE
Status: COMPLETED | OUTPATIENT
Start: 2020-12-20 | End: 2020-12-20

## 2020-12-20 RX ORDER — ACETAMINOPHEN 325 MG/1
650 TABLET ORAL EVERY 6 HOURS PRN
Status: DISCONTINUED | OUTPATIENT
Start: 2020-12-20 | End: 2020-12-21 | Stop reason: HOSPADM

## 2020-12-20 RX ADMIN — SODIUM CHLORIDE: 9 INJECTION, SOLUTION INTRAVENOUS at 23:39

## 2020-12-20 RX ADMIN — BUPIVACAINE HYDROCHLORIDE 150 MG: 5 INJECTION, SOLUTION EPIDURAL; INTRACAUDAL; PERINEURAL at 17:18

## 2020-12-20 RX ADMIN — INSULIN HUMAN 8 UNITS: 100 INJECTION, SOLUTION PARENTERAL at 17:20

## 2020-12-20 RX ADMIN — SODIUM CHLORIDE 500 ML: 9 INJECTION, SOLUTION INTRAVENOUS at 16:54

## 2020-12-20 RX ADMIN — LIDOCAINE HYDROCHLORIDE 5 ML: 10 INJECTION, SOLUTION EPIDURAL; INFILTRATION; INTRACAUDAL; PERINEURAL at 17:19

## 2020-12-20 RX ADMIN — KETOROLAC TROMETHAMINE 15 MG: 30 INJECTION, SOLUTION INTRAMUSCULAR; INTRAVENOUS at 16:54

## 2020-12-20 RX ADMIN — PIPERACILLIN SODIUM AND TAZOBACTAM SODIUM 3.38 G: 3; .375 INJECTION, POWDER, LYOPHILIZED, FOR SOLUTION INTRAVENOUS at 16:46

## 2020-12-20 ASSESSMENT — PAIN SCALES - GENERAL
PAINLEVEL_OUTOF10: 0
PAINLEVEL_OUTOF10: 0
PAINLEVEL_OUTOF10: 6
PAINLEVEL_OUTOF10: 6

## 2020-12-20 ASSESSMENT — ENCOUNTER SYMPTOMS
EYE DISCHARGE: 0
WHEEZING: 0
SORE THROAT: 0
TROUBLE SWALLOWING: 0
NAUSEA: 0
RHINORRHEA: 0
COUGH: 0
ABDOMINAL PAIN: 0
COLOR CHANGE: 0
DIARRHEA: 1
VOMITING: 0
SHORTNESS OF BREATH: 0
EYE REDNESS: 0
COLOR CHANGE: 1

## 2020-12-20 NOTE — ED NOTES
Daljit Ramsey called, she is the oldest daughter of the patient.   She wanted to leave her number [phone]     Cheng Friend  12/20/20 [id number]

## 2020-12-20 NOTE — ED PROVIDER NOTES
[facility]  eMERGENCY dEPARTMENT eNCOUnter      Pt Name: Ariana Matt  MRN: [id number]  [place] 1960  Date of evaluation: 12/20/2020  Provider: Teresa Amaro MD    CHIEF COMPLAINT       Chief Complaint   Patient presents with    Abscess     R groin         HISTORY OF PRESENT ILLNESS   (Location/Symptom, Timing/Onset,Context/Setting, Quality, Duration, Modifying Factors, Severity)  Note limiting factors. Ariana Matt is a 61 y.o. female who presents to the emergency department complaining of \"a lump that is draining pus\" on her right thigh. Patient states that she intermittently \"breaks out in that area\" and uses Silvadene. Patient states that she began having \"another breakout\" approximately 1 week ago. She noticed \"a lump\" 2 days ago. Patient states that it started draining \"bloody blackish fluid. \"  Patient has noted a foul odor. Patient states that her observations are limited \"because I cannot see in that area. \"  Patient points to her upper right thigh under her pannus as location. Patient states that she is a diabetic and that her sugars run in the 300s. Patient is currently on insulin. Patient states that she is incontinent of urine nightly and lays on her right side. Patient states that she has skin irritation and \"breakouts\" in that area frequently. Patient denies needing surgery or having abscesses drained. Patient denies fever, chills, nausea, vomiting, or other areas of concern. HPI    NursingNotes were reviewed. REVIEW OF SYSTEMS    (2-9 systems for level 4, 10 or more for level 5)     Review of Systems   Constitutional: Negative for chills and fever. HENT: Negative for congestion and rhinorrhea. Eyes: Negative for discharge. Respiratory: Negative for cough and shortness of breath. Cardiovascular: Negative for chest pain and palpitations. Gastrointestinal: Positive for diarrhea (Since last summer). Negative for abdominal pain, nausea and vomiting. Genitourinary: Negative for difficulty urinating and dysuria. Chronic urine incontinence   Skin: Positive for rash and wound. Negative for color change and pallor. Neurological: Negative for syncope and light-headedness. Psychiatric/Behavioral: Negative for agitation and confusion. All other systems reviewed and are negative. PAST MEDICALHISTORY     Past Medical History:   Diagnosis Date    Acquired hypothyroidism     Diabetes ([facility] [address]     Essential hypertension     Generalized osteoarthritis 3/21/2018    Hyperlipidemia     Hypertension     Hypothyroidism     Morbid obesity due to excess calories ([facility] [address] 6/24/2018    Type 2 diabetes mellitus without complication, with long-term current use of insulin ([facility])          SURGICAL HISTORY       Past Surgical History:   Procedure Laterality Date    CHOLECYSTECTOMY      TUBAL LIGATION           CURRENT MEDICATIONS     Previous Medications    DULOXETINE (CYMBALTA) 30 MG EXTENDED RELEASE CAPSULE    Take 1 capsule by mouth once daily    GLUCOSE BLOOD VI TEST STRIPS (FREESTYLE LITE) STRIP    1 each by In Vitro route 3 times daily As needed.     LEVOTHYROXINE (SYNTHROID) 200 MCG TABLET    Take 1 tablet by mouth daily    MELOXICAM (MOBIC) 15 MG TABLET    Take 1 tablet by mouth once daily    NOVOLIN R 100 UNIT/ML INJECTION    Sliding scale,  Use as directed - up to 150 units daily    SIMVASTATIN (ZOCOR) 80 MG TABLET    TAKE ONE TABLET BY MOUTH ONCE DAILY    ZOLPIDEM (AMBIEN) 10 MG TABLET    TAKE 1 TABLET BY MOUTH EVERY NIGHT AS NEEDED FOR SLEEP       ALLERGIES     Compazine [prochlorperazine maleate]    FAMILY HISTORY       Family History   Problem Relation Age of Onset    Diabetes Mother     Bipolar Disorder Mother           SOCIAL HISTORY       Social History     Socioeconomic History    Marital status:      Spouse name: None General: No scleral icterus. Conjunctiva/sclera: Conjunctivae normal.      Pupils: Pupils are equal, round, and reactive to light. Neck:      Musculoskeletal: Neck supple. No neck rigidity. Cardiovascular:      Rate and Rhythm: Regular rhythm. Tachycardia present. Pulses: Normal pulses. Heart sounds: Normal heart sounds. Pulmonary:      Effort: Pulmonary effort is normal.      Breath sounds: Normal breath sounds. Abdominal:      General: Bowel sounds are normal.      Palpations: Abdomen is soft. Tenderness: There is no abdominal tenderness. There is no guarding. Musculoskeletal:         General: No tenderness or deformity. Comments: Lesion to the upper right thigh is erythematous, indurated with central area approximately 3 cm x 4 cm that is fluctuant and draining foul-smelling dark fluid. 1 cm x 1-1/2 cm area overlying fluctuant area is necrotic. Entire area is tender to palpation and hot to touch. Skin:     General: Skin is warm and dry. Capillary Refill: Capillary refill takes less than 2 seconds. Coloration: Skin is not jaundiced. Neurological:      General: No focal deficit present. Mental Status: She is alert and oriented to person, place, and time. Mental status is at baseline. Coordination: Coordination normal.   Psychiatric:         Mood and Affect: Mood normal.         Behavior: Behavior normal.         DIAGNOSTIC RESULTS     RADIOLOGY:  Non-plain film images such as CT, Ultrasound and MRI are read by the radiologist. Plain radiographic images are visualized and preliminarily interpreted bythe emergency physician with the below findings:        Ronak   Final Result   Findings/impression:   Targeted ultrasound of the RIGHT groin soft tissues at the site of a   draining wound was performed.  At this site, there is a 5.2 x 3.9 x 4.8   cm heterogeneous superficial soft tissue collection with surrounding soft tissue edema. Given clinical history and sonographic appearance,   this likely represents a superficial soft tissue abscess. Recommend   clinical follow-up to document resolution. Signed by Dr Jasmin Branch on 12/20/2020 3:47 PM              LABS:  Labs Reviewed   CBC WITH AUTO DIFFERENTIAL - Abnormal; Notable for the following components:       Result Value    WBC 11.1 (*)     MCHC 32.8 (*)     Neutrophils % 68.7 (*)     Neutrophils Absolute 7.6 (*)     Monocytes Absolute 1.00 (*)     All other components within normal limits   COMPREHENSIVE METABOLIC PANEL W/ REFLEX TO MG FOR LOW K - Abnormal; Notable for the following components:    Sodium 134 (*)     Chloride 96 (*)     Glucose 339 (*)     BUN 7 (*)     Alkaline Phosphatase 114 (*)     All other components within normal limits   CULTURE, WOUND   URINE RT REFLEX TO CULTURE   SEDIMENTATION RATE   C-REACTIVE PROTEIN   POCT GLUCOSE   POCT GLUCOSE   POCT GLUCOSE       All other labs were within normal range or not returned as of this dictation. EMERGENCY DEPARTMENT COURSE and DIFFERENTIAL DIAGNOSIS/MDM:   Vitals:    Vitals:    12/20/20 1339 12/20/20 1340   BP:  (!) 160/95   Pulse:  110   Resp: 18    Temp: 97.9 °F (36.6 °C)    SpO2:  96%   Weight: 299 lb (135.6 kg)    Height: 5' 7\" (1.702 m)        MDM  51-year-old female with history of diabetes presents with abscess and necrosis of skin to right upper thigh. Lab results reviewed. Discussed with Dr. Berenice Nunez, surgeon who will consult on this patient. Discussed with Dr. Aldair Yuan, hospitalist, who agrees to admit patient for further evaluation and treatment. Zosyn IV and insulin given in the emergency department. Patient verbalizes understanding and agreement with plan of care.       CONSULTS:  PHARMACY TO DOSE VANCOMYCIN    PROCEDURES:  Unless otherwise noted below, none     Incision/Drainage    Date/Time: 12/20/2020 5:19 PM  Performed by: Teresa Amaro MD  Authorized by: Teresa Amaro MD Consent:     Consent obtained:  Verbal    Consent given by:  Patient    Risks discussed:  Bleeding, incomplete drainage and pain    Alternatives discussed:  No treatment and observation  Location:     Type:  Abscess    Location:  Lower extremity    Lower extremity location:  Leg    Leg location:  R upper leg  Pre-procedure details:     Skin preparation:  Betadine  Anesthesia (see MAR for exact dosages): Anesthesia method:  Local infiltration    Local anesthetic:  Lidocaine 1% w/o epi and bupivacaine 0.25% w/o epi  Procedure type:     Complexity:  Simple  Procedure details:     Needle aspiration: no      Incision types:  Cruciate    Incision depth:  Submucosal    Scalpel blade:  11    Wound management:  Probed and deloculated and irrigated with saline    Drainage:  Bloody and purulent    Drainage amount:  Copious    Wound treatment:  Wound left open    Packing materials:  1/2 in iodoform gauze  Post-procedure details:     Patient tolerance of procedure: Tolerated well, no immediate complications        FINAL IMPRESSION      1. Cellulitis and abscess of leg    2. Uncontrolled type 2 diabetes mellitus with hyperglycemia ([facility] [address]    3. Morbid obesity ([facility] [address]          DISPOSITION/PLAN   DISPOSITION Admitted 12/20/2020 05:10:14 PM      PATIENT REFERRED TO:  No follow-up provider specified.     DISCHARGE MEDICATIONS:  New Prescriptions    No medications on file          (Please note that portions of this note were completed with a voice recognition program.  Efforts were made to edit thedictations but occasionally words are mis-transcribed.)    Jim Campbell MD (electronically signed)  Attending Emergency Physician         Jim Campbell MD  12/20/20 3957

## 2020-12-20 NOTE — H&P
HISTORY AND PHYSICAL             Date: 12/20/2020        Patient Name: Tricia Doss     YOB: 1960      Age:  61 y.o. Chief Complaint     Chief Complaint   Patient presents with    Abscess     R groin        History Obtained From   patient, electronic medical record, emergency room physician, nursing staff    History of Present Illness   Patient is a 58-year-old female patient of Dr. Divina Mercado with a known past medical history of hypertension, type 2 diabetes, hypothyroidism, hyperlipidemia, morbid obesity, presenting to [address] [place] [address] emergency room with worsening right groin cellulitis/abscess. Patient states this is a recurrent issue, follows up with primary care provider and usually put topical sulfadiazine however cannot schedule an appointment. Patient also states that she is unable to see the location due to pannus. Denies any recent changes in home medications. Work-up in the emergency room revealed patient to have slight elevation of WBC count, ultrasound of the right extremity noting a 5.2 x 3.9 x 4.8 superficial soft tissue collection. Patient states it is minimally tender, notices sometimes dark fluid running down the leg. Patient cannot recall how it started, denies any trauma to the area or insect bites. Currently denies any headache, change in vision, chest pain, abdominal pain, nausea vomiting, fevers or chills. Patient to be admitted for ongoing antibiotic therapy, general surgery consultation. ER physician states abscess to be lanced in ER bay.     Past Medical History     Past Medical History:   Diagnosis Date    Acquired hypothyroidism     Diabetes ([address]     Essential hypertension     Generalized osteoarthritis 3/21/2018    Hyperlipidemia     Hypertension     Hypothyroidism     Morbid obesity due to excess calories ([address] 6/24/2018    Type 2 diabetes mellitus without complication, with long-term current use of insulin [facility])         Past Surgical History Past Surgical History:   Procedure Laterality Date    CHOLECYSTECTOMY      TUBAL LIGATION          Medications Prior to Admission     Prior to Admission medications    Medication Sig Start Date End Date Taking? Authorizing Provider   zolpidem (AMBIEN) 10 MG tablet TAKE 1 TABLET BY MOUTH EVERY NIGHT AS NEEDED FOR SLEEP 9/14/20 12/20/20  Reina Ng MD   meloxicam (MOBIC) 15 MG tablet Take 1 tablet by mouth once daily 7/29/20   Reina Ng MD   DULoxetine (CYMBALTA) 30 MG extended release capsule Take 1 capsule by mouth once daily 3/5/20   Reina Ng MD   levothyroxine (SYNTHROID) 200 MCG tablet Take 1 tablet by mouth daily 12/19/19   Reina Ng MD   simvastatin (ZOCOR) 80 MG tablet TAKE ONE TABLET BY MOUTH ONCE DAILY 1/23/19   Reina Ng MD   metFORMIN (GLUCOPHAGE) 1000 MG tablet TAKE ONE TABLET BY MOUTH TWICE DAILY WITH FOOD 1/23/19 12/19/19  Reina Ng MD   NOVOLIN R 100 UNIT/ML injection Sliding scale,  Use as directed - up to 150 units daily 4/27/18   Reina Ng MD   glucose blood VI test strips (FREESTYLE LITE) strip 1 each by In Vitro route 3 times daily As needed. 1/10/18   Reina Ng MD        Allergies   Compazine [prochlorperazine maleate]    Social History     Social History     Tobacco History     Smoking Status  Never Smoker    Smokeless Tobacco Use  Never Used          Alcohol History     Alcohol Use Status  No          Drug Use     Drug Use Status  No          Sexual Activity     Sexually Active  Yes Partners  Male                Family History     Family History   Problem Relation Age of Onset    Diabetes Mother     Bipolar Disorder Mother        Review of Systems   Review of Systems   Constitutional: Negative for activity change and fatigue. HENT: Negative for sore throat and trouble swallowing. Eyes: Negative for redness and visual disturbance. Respiratory: Negative for shortness of breath and wheezing. Cardiovascular: Negative for chest pain and palpitations. Gastrointestinal: Negative for nausea and vomiting. Skin: Positive for color change, rash and wound. Neurological: Negative for light-headedness and headaches. Psychiatric/Behavioral: Negative for agitation and confusion. Physical Exam   BP (!) 160/95   Pulse 110   Temp 97.9 °F (36.6 °C)   Resp 18   Ht 5' 7\" (1.702 m)   Wt 299 lb (135.6 kg)   SpO2 96%   BMI 46.83 kg/m²     Physical Exam  Vitals signs and nursing note reviewed. Constitutional:       General: She is not in acute distress. Appearance: She is obese. She is not toxic-appearing. HENT:      Head: Normocephalic and atraumatic. Nose: Nose normal.   Eyes:      General:         Right eye: No discharge. Left eye: No discharge. Neck:      Musculoskeletal: Normal range of motion. Cardiovascular:      Rate and Rhythm: Regular rhythm. Tachycardia present. Pulses: Normal pulses. Pulmonary:      Breath sounds: No wheezing or rales. Abdominal:      General: Bowel sounds are normal.      Tenderness: There is no abdominal tenderness. There is no guarding or rebound. Musculoskeletal:      Right lower leg: No edema. Left lower leg: No edema. Skin:     General: Skin is warm. Findings: Abscess, lesion and wound present. Neurological:      Mental Status: She is alert and oriented to person, place, and time. Mental status is at baseline.    Psychiatric:         Mood and Affect: Mood normal.         Labs      Recent Results (from the past 24 hour(s))   CBC Auto Differential    Collection Time: 12/20/20  4:20 PM   Result Value Ref Range    WBC 11.1 (H) 4.8 - 10.8 K/uL    RBC 4.76 4.20 - 5.40 M/uL    Hemoglobin 14.4 12.0 - 16.0 g/dL    Hematocrit 43.9 37.0 - 47.0 %    MCV 92.2 81.0 - 99.0 fL    MCH 30.3 27.0 - 31.0 pg    MCHC 32.8 (L) 33.0 - 37.0 g/dL    RDW 13.1 11.5 - 14.5 %    Platelets 174 606 - 371 K/uL    MPV 9.5 9.4 - 12.3 fL    Neutrophils % 68.7 (H) 50.0 - 65.0 % Lymphocytes % 21.0 20.0 - 40.0 %    Monocytes % 8.8 0.0 - 10.0 %    Eosinophils % 0.9 0.0 - 5.0 %    Basophils % 0.4 0.0 - 1.0 %    Neutrophils Absolute 7.6 (H) 1.5 - 7.5 K/uL    Immature Granulocytes # 0.0 K/uL    Lymphocytes Absolute 2.3 1.1 - 4.5 K/uL    Monocytes Absolute 1.00 (H) 0.00 - 0.90 K/uL    Eosinophils Absolute 0.10 0.00 - 0.60 K/uL    Basophils Absolute 0.00 0.00 - 0.20 K/uL   Comprehensive Metabolic Panel w/ Reflex to MG    Collection Time: 12/20/20  4:20 PM   Result Value Ref Range    Sodium 134 (L) 136 - 145 mmol/L    Potassium reflex Magnesium 4.0 3.5 - 5.0 mmol/L    Chloride 96 (L) 98 - 111 mmol/L    CO2 25 22 - 29 mmol/L    Anion Gap 13 7 - 19 mmol/L    Glucose 339 (H) 74 - 109 mg/dL    BUN 7 (L) 8 - 23 mg/dL    CREATININE 0.5 0.5 - 0.9 mg/dL    GFR Non-African American >60 >60    GFR African American >59 >59    Calcium 9.0 8.8 - 10.2 mg/dL    Total Protein 7.3 6.6 - 8.7 g/dL    Alb 3.9 3.5 - 5.2 g/dL    Total Bilirubin 0.6 0.2 - 1.2 mg/dL    Alkaline Phosphatase 114 (H) 35 - 104 U/L    ALT 14 5 - 33 U/L    AST 20 5 - 32 U/L        Imaging/Diagnostics Last 24 Hours   Us Extremity Right Non Vasc Limited    Result Date: 12/20/2020  Exam: US EXTREMITY RIGHT NON VASC LIMITED - 12/20/2020 2:29 PM Indication: Abscess/cellulitis Comparison: None available. Findings/impression: Targeted ultrasound of the RIGHT groin soft tissues at the site of a draining wound was performed. At this site, there is a 5.2 x 3.9 x 4.8 cm heterogeneous superficial soft tissue collection with surrounding soft tissue edema. Given clinical history and sonographic appearance, this likely represents a superficial soft tissue abscess. Recommend clinical follow-up to document resolution.  Signed by Dr Shanelle Contreras on 12/20/2020 3:47 PM      Assessment      Hospital Problems           Last Modified POA    Abscess of right groin 12/20/2020 Yes          Plan     Worsening abscess of the right groin -Evidence of superficial abscess on ultrasound the right extremity, plans for bedside incision and drainage  -Follow-up with wound culture  -Follow-up CRP/ESR, continue with IV antibiotics Zosyn/pharmacy dosing  -Optimization of blood sugars  -Wound care consultation, general surgery consultation appreciate further recommendation  -Daily CBC    Hyponatremia  -Continue with normal saline fluid, monitor daily metabolic profile    Type 2 diabeteschronic condition, hold oral antihyperglycemic's, insulin modalities Lantus, Premeal insulin, sliding scale, C carb diet, hypoglycemic protocol    Morbid obesitynoted, dietary consultation    Depression/anxietychronic condition, continue Cymbalta    Hypothyroidismchronic condition, continue with Synthroid    Hyperlipidemiachronic condition, continue with statin therapy      Consultations Ordered:  PHARMACY TO DOSE VANCOMYCIN    EMR Dragon/Transcription disclaimer:   Much of this encounter note is an electronic transcription/translation of spoken language to printed text.  The electronic translation of spoken language may permit erroneous, or at times, nonsensical words or phrases to be inadvertently transcribed; although attempts have made to review the note for such errors, some may still exist.    Electronically signed by   Naheed Sterling   Internal Medicine Hospitalist  On 12/20/2020  At 5:37 PM

## 2020-12-20 NOTE — PROGRESS NOTES
Patient presents to clinic today with complaints of \"sore\" right groin that has black drainage. Upon exam by myself and co-worker/chaperone Brock Lira, APRN revealed large abscess with copious amounts of brown/black drainage and extremely foul odor. Pt is diabetic, likely needs further evaluation and possible general surgery consult and or IV antibiotics which requires higher level of care. Pt left in stable condition and will proceed to ER.
 Compazine [Prochlorperazine Maleate] Anaphylaxis       Health Maintenance   Topic Date Due    Hepatitis C screen  1960    Pneumococcal 0-64 years Vaccine (1 of 1 - PPSV23) 06/12/1966    Diabetic foot exam  06/12/1970    Diabetic retinal exam  06/12/1970    HIV screen  06/12/1975    Cervical cancer screen  06/12/1981    Shingles Vaccine (1 of 2) 06/12/2010    Colon cancer screen colonoscopy  06/12/2010    Annual Wellness Visit (AWV)  05/29/2019    Diabetic microalbuminuria test  03/01/2020    Flu vaccine (1) 09/01/2020    A1C test (Diabetic or Prediabetic)  12/18/2020    TSH testing  12/18/2020    Potassium monitoring  12/18/2020    Creatinine monitoring  12/18/2020    Lipid screen  12/18/2020    Breast cancer screen  02/18/2021    DTaP/Tdap/Td vaccine (2 - Td) 03/01/2025    Hepatitis A vaccine  Aged Out    Hib vaccine  Aged Out    Meningococcal (ACWY) vaccine  Aged Out       Subjective:     Review of Systems    Objective:     Physical Exam  /75   Pulse 93   Temp 97.6 °F (36.4 °C)   Resp 20   Wt 299 lb (135.6 kg)   SpO2 95%   BMI 46.83 kg/m²     Assessment:      {No diagnosis found. (Refresh or delete this SmartLink)}    Plan:    No orders of the defined types were placed in this encounter. No follow-ups on file. No orders of the defined types were placed in this encounter. Patient given educational materials- see patient instructions. Discussed use, benefit, and side effects of prescribedmedications. All patient questions answered. Pt voiced understanding. Reviewedhealth maintenance. Instructed to continue current medications, diet and exercise. Patient agreed with treatment plan. Follow up as directed. There are no Patient Instructions on file for this visit.       Electronically signed by STANFORD Cullen CNP on 12/20/2020 at 1:11 PM

## 2020-12-21 ENCOUNTER — TELEPHONE (OUTPATIENT)
Dept: INTERNAL MEDICINE | Age: 60
End: 2020-12-21

## 2020-12-21 VITALS
TEMPERATURE: 98 F | BODY MASS INDEX: 45.27 KG/M2 | DIASTOLIC BLOOD PRESSURE: 81 MMHG | HEART RATE: 92 BPM | RESPIRATION RATE: 20 BRPM | OXYGEN SATURATION: 97 % | WEIGHT: 288.4 LBS | SYSTOLIC BLOOD PRESSURE: 155 MMHG | HEIGHT: 67 IN

## 2020-12-21 LAB
ANION GAP SERPL CALCULATED.3IONS-SCNC: 12 MMOL/L (ref 7–19)
BILIRUBIN URINE: NEGATIVE
BLOOD, URINE: NEGATIVE
BUN BLDV-MCNC: 9 MG/DL (ref 8–23)
C-REACTIVE PROTEIN: 6.49 MG/DL (ref 0–0.5)
CALCIUM SERPL-MCNC: 8.5 MG/DL (ref 8.8–10.2)
CHLORIDE BLD-SCNC: 101 MMOL/L (ref 98–111)
CLARITY: CLEAR
CO2: 24 MMOL/L (ref 22–29)
COLOR: YELLOW
CREAT SERPL-MCNC: 0.6 MG/DL (ref 0.5–0.9)
GFR AFRICAN AMERICAN: >59
GFR NON-AFRICAN AMERICAN: >60
GLUCOSE BLD-MCNC: 235 MG/DL (ref 70–99)
GLUCOSE BLD-MCNC: 251 MG/DL (ref 70–99)
GLUCOSE BLD-MCNC: 253 MG/DL (ref 70–99)
GLUCOSE BLD-MCNC: 297 MG/DL (ref 70–99)
GLUCOSE BLD-MCNC: 327 MG/DL (ref 74–109)
GLUCOSE URINE: =>1000 MG/DL
HCT VFR BLD CALC: 39.5 % (ref 37–47)
HEMOGLOBIN: 13.1 G/DL (ref 12–16)
KETONES, URINE: ABNORMAL MG/DL
LEUKOCYTE ESTERASE, URINE: NEGATIVE
MCH RBC QN AUTO: 30.5 PG (ref 27–31)
MCHC RBC AUTO-ENTMCNC: 33.2 G/DL (ref 33–37)
MCV RBC AUTO: 91.9 FL (ref 81–99)
NITRITE, URINE: NEGATIVE
PDW BLD-RTO: 13.1 % (ref 11.5–14.5)
PERFORMED ON: ABNORMAL
PH UA: 5.5 (ref 5–8)
PLATELET # BLD: 351 K/UL (ref 130–400)
PMV BLD AUTO: 9.5 FL (ref 9.4–12.3)
POTASSIUM REFLEX MAGNESIUM: 3.9 MMOL/L (ref 3.5–5)
PROTEIN UA: NEGATIVE MG/DL
RBC # BLD: 4.3 M/UL (ref 4.2–5.4)
SODIUM BLD-SCNC: 137 MMOL/L (ref 136–145)
SPECIFIC GRAVITY UA: 1.03 (ref 1–1.03)
UROBILINOGEN, URINE: 0.2 E.U./DL
WBC # BLD: 8.1 K/UL (ref 4.8–10.8)

## 2020-12-21 PROCEDURE — 85027 COMPLETE CBC AUTOMATED: CPT

## 2020-12-21 PROCEDURE — 81003 URINALYSIS AUTO W/O SCOPE: CPT

## 2020-12-21 PROCEDURE — 36415 COLL VENOUS BLD VENIPUNCTURE: CPT

## 2020-12-21 PROCEDURE — 86140 C-REACTIVE PROTEIN: CPT

## 2020-12-21 PROCEDURE — 6370000000 HC RX 637 (ALT 250 FOR IP): Performed by: SURGERY

## 2020-12-21 PROCEDURE — 6370000000 HC RX 637 (ALT 250 FOR IP): Performed by: FAMILY MEDICINE

## 2020-12-21 PROCEDURE — 2580000003 HC RX 258: Performed by: FAMILY MEDICINE

## 2020-12-21 PROCEDURE — 80048 BASIC METABOLIC PNL TOTAL CA: CPT

## 2020-12-21 PROCEDURE — 82947 ASSAY GLUCOSE BLOOD QUANT: CPT

## 2020-12-21 PROCEDURE — 6360000002 HC RX W HCPCS: Performed by: FAMILY MEDICINE

## 2020-12-21 RX ORDER — SODIUM HYPOCHLORITE 1.25 MG/ML
SOLUTION TOPICAL DAILY
Qty: 473 ML | Refills: 0 | Status: SHIPPED | OUTPATIENT
Start: 2020-12-21 | End: 2021-02-04 | Stop reason: ALTCHOICE

## 2020-12-21 RX ORDER — CEPHALEXIN 500 MG/1
500 CAPSULE ORAL 3 TIMES DAILY
Qty: 30 CAPSULE | Refills: 0 | Status: SHIPPED | OUTPATIENT
Start: 2020-12-21 | End: 2020-12-31

## 2020-12-21 RX ORDER — SODIUM HYPOCHLORITE 1.25 MG/ML
SOLUTION TOPICAL DAILY
Status: DISCONTINUED | OUTPATIENT
Start: 2020-12-21 | End: 2020-12-21 | Stop reason: HOSPADM

## 2020-12-21 RX ADMIN — PIPERACILLIN SODIUM AND TAZOBACTAM SODIUM 3.38 G: 3; .375 INJECTION, POWDER, LYOPHILIZED, FOR SOLUTION INTRAVENOUS at 14:37

## 2020-12-21 RX ADMIN — INSULIN LISPRO 10 UNITS: 100 INJECTION, SOLUTION INTRAVENOUS; SUBCUTANEOUS at 14:00

## 2020-12-21 RX ADMIN — DAKIN'S SOLUTION 0.125% (QUARTER STRENGTH): 0.12 SOLUTION at 17:12

## 2020-12-21 RX ADMIN — PIPERACILLIN SODIUM AND TAZOBACTAM SODIUM 3.38 G: 3; .375 INJECTION, POWDER, LYOPHILIZED, FOR SOLUTION INTRAVENOUS at 03:08

## 2020-12-21 RX ADMIN — INSULIN LISPRO 3 UNITS: 100 INJECTION, SOLUTION INTRAVENOUS; SUBCUTANEOUS at 00:37

## 2020-12-21 RX ADMIN — PIPERACILLIN SODIUM AND TAZOBACTAM SODIUM 3.38 G: 3; .375 INJECTION, POWDER, LYOPHILIZED, FOR SOLUTION INTRAVENOUS at 10:13

## 2020-12-21 RX ADMIN — INSULIN LISPRO 6 UNITS: 100 INJECTION, SOLUTION INTRAVENOUS; SUBCUTANEOUS at 14:00

## 2020-12-21 RX ADMIN — INSULIN LISPRO 6 UNITS: 100 INJECTION, SOLUTION INTRAVENOUS; SUBCUTANEOUS at 17:11

## 2020-12-21 RX ADMIN — VANCOMYCIN HYDROCHLORIDE 1750 MG: 1 INJECTION, POWDER, LYOPHILIZED, FOR SOLUTION INTRAVENOUS at 00:37

## 2020-12-21 RX ADMIN — ENOXAPARIN SODIUM 40 MG: 40 INJECTION SUBCUTANEOUS at 00:36

## 2020-12-21 RX ADMIN — VANCOMYCIN HYDROCHLORIDE 1750 MG: 1 INJECTION, POWDER, LYOPHILIZED, FOR SOLUTION INTRAVENOUS at 11:05

## 2020-12-21 RX ADMIN — INSULIN LISPRO 10 UNITS: 100 INJECTION, SOLUTION INTRAVENOUS; SUBCUTANEOUS at 17:10

## 2020-12-21 RX ADMIN — INSULIN GLARGINE 30 UNITS: 100 INJECTION, SOLUTION SUBCUTANEOUS at 00:36

## 2020-12-21 NOTE — DISCHARGE SUMMARY
Surgeries/Procedures Performed:       Treatments:   IV hydration, antibiotics: Vancomycin, Zosyn, analgesia: acetaminophen, anticoagulation: LMW heparin, insulin: Humalog and Lantus and electrolyte stabilization    Significant Diagnostic Studies:   Recent Labs:  CBC:   Lab Results   Component Value Date    WBC 8.1 12/21/2020    RBC 4.30 12/21/2020    HGB 13.1 12/21/2020    HCT 39.5 12/21/2020    MCV 91.9 12/21/2020    MCH 30.5 12/21/2020    MCHC 33.2 12/21/2020    RDW 13.1 12/21/2020     12/21/2020     BMP:    Lab Results   Component Value Date    GLUCOSE 327 12/21/2020     12/21/2020    K 3.9 12/21/2020     12/21/2020    CO2 24 12/21/2020    ANIONGAP 12 12/21/2020    BUN 9 12/21/2020    CREATININE 0.6 12/21/2020    CALCIUM 8.5 12/21/2020    LABGLOM >60 12/21/2020    GFRAA >59 12/21/2020     HFP:    Lab Results   Component Value Date    PROT 7.3 12/20/2020     CMP:    Lab Results   Component Value Date    GLUCOSE 327 12/21/2020     12/21/2020    K 3.9 12/21/2020     12/21/2020    CO2 24 12/21/2020    BUN 9 12/21/2020    CREATININE 0.6 12/21/2020    ANIONGAP 12 12/21/2020    ALKPHOS 114 12/20/2020    ALT 14 12/20/2020    AST 20 12/20/2020    BILITOT 0.6 12/20/2020    LABALBU 3.9 12/20/2020    LABGLOM >60 12/21/2020    GFRAA >59 12/21/2020    PROT 7.3 12/20/2020    CALCIUM 8.5 12/21/2020     PT/INR:  No results found for: PTINR, PROTIME, INR  PTT: No results found for: APTT  FLP:    Lab Results   Component Value Date    CHOL 176 12/18/2019    TRIG 110 12/18/2019    HDL 61 12/18/2019     U/A:    Lab Results   Component Value Date    COLORU YELLOW 12/21/2020    SPECGRAV 1.031 12/21/2020    PHUR 5.5 12/21/2020    PROTEINU Negative 12/21/2020    GLUCOSEU =>1000 12/21/2020    KETUA TRACE 12/21/2020    BILIRUBINUR Negative 12/21/2020    BILIRUBINUR negative 11/20/2016    UROBILINOGEN 0.2 12/21/2020    NITRU Negative 12/21/2020    LEUKOCYTESUR Negative 12/21/2020     TSH:    Lab Results Component Value Date    TSH 12.830 12/18/2019       Radiology Last 7 Days:  Us Extremity Right Non Vasc Limited    Result Date: 12/20/2020  Findings/impression: Targeted ultrasound of the RIGHT groin soft tissues at the site of a draining wound was performed. At this site, there is a 5.2 x 3.9 x 4.8 cm heterogeneous superficial soft tissue collection with surrounding soft tissue edema. Given clinical history and sonographic appearance, this likely represents a superficial soft tissue abscess. Recommend clinical follow-up to document resolution. Signed by Dr Margherita Ahumada on 12/20/2020 3:47 PM    Physical Exam  Vitals signs and nursing note reviewed. Constitutional:       Patient resting comfortably in bed no acute distress     Appearance: She is obese. She is not toxic-appearing. HENT:      Head: Normocephalic and atraumatic.      Nose: Nose normal.   Eyes:      General:         Right eye: No discharge.         Left eye: No discharge. Neck:      Musculoskeletal: Normal range of motion. Cardiovascular:      Rate and Rhythm: Regular rhythm. Tachycardia present.      Pulses: Normal pulses. Pulmonary:      Breath sounds: No wheezing or rales. Abdominal:      General: Bowel sounds are normal.      Tenderness: There is no abdominal tenderness. There is no guarding or rebound. Musculoskeletal:      Right lower leg: No edema.      Left lower leg: No edema. Skin:     General: Skin is warm.      Findings: Abscess, lesion and wound present.          Neurological:      Mental Status: She is alert and oriented to person, place, and time. Mental status is at baseline. Psychiatric: Blanchie Dial and Affect: Mood normal.       Discharge Plan   Disposition: Home (home health services arranged)     Provider Follow-Up:   MD CASSY Melgoza/ [address]  [phone]    On 12/28/2020  Hospital follow up for 12/28/20 @ 2:30 p.m.     [id number] [address] [place] [address] [phone] [phone]  In 2 weeks           Patient Instructions   Diet: diabetic diet    Activity: activity as tolerated      Discharge Medications         Medication List      START taking these medications    cephALEXin 500 MG capsule  Commonly known as: KEFLEX  Take 1 capsule by mouth 3 times daily for 10 days     sodium hypochlorite 0.125 % Soln external solution  Commonly known as: DAKINS  Apply topically daily        CONTINUE taking these medications    blood glucose test strips strip  Commonly known as: FREESTYLE LITE  1 each by In Vitro route 3 times daily As needed.      DULoxetine 30 MG extended release capsule  Commonly known as: CYMBALTA  Take 1 capsule by mouth once daily     levothyroxine 200 MCG tablet  Commonly known as: SYNTHROID  Take 1 tablet by mouth daily     meloxicam 15 MG tablet  Commonly known as: MOBIC  Take 1 tablet by mouth once daily     NovoLIN R 100 UNIT/ML injection  Generic drug: insulin regular  Sliding scale,  Use as directed - up to 150 units daily     simvastatin 80 MG tablet  Commonly known as: ZOCOR  TAKE ONE TABLET BY MOUTH ONCE DAILY     zolpidem 10 MG tablet  Commonly known as: AMBIEN  TAKE 1 TABLET BY MOUTH EVERY NIGHT AS NEEDED FOR SLEEP        STOP taking these medications    metFORMIN 1000 MG tablet  Commonly known as: GLUCOPHAGE           Where to Get Your Medications      These medications were sent to [address] [place] [address]    Phone: [phone]   · cephALEXin 500 MG capsule  · sodium hypochlorite 0.125 % Soln external solution         EMR Dragon/Transcription disclaimer: Much of this encounter note is an electronic transcription/translation of spoken language to printed text.  The electronic translation of spoken language may permit erroneous, or at times, nonsensical words or phrases to be inadvertently transcribed; although attempts have made to review the note for such errors, some may still exist.    .Electronically signed by   Donnell Lenz   Internal Medicine Hospitalist  On 12/21/2020  At 2:05 PM

## 2020-12-21 NOTE — PLAN OF CARE
Problem:  Activity:  Goal: Risk for activity intolerance will decrease  Description: Risk for activity intolerance will decrease  Outcome: Ongoing     Problem: Coping:  Goal: Ability to adjust to condition or change in health will improve  Description: Ability to adjust to condition or change in health will improve  Outcome: Ongoing     Problem: Fluid Volume:  Goal: Ability to maintain a balanced intake and output will improve  Description: Ability to maintain a balanced intake and output will improve  Outcome: Ongoing     Problem: Health Behavior:  Goal: Ability to identify and utilize available resources and services will improve  Description: Ability to identify and utilize available resources and services will improve  Outcome: Ongoing  Goal: Ability to manage health-related needs will improve  Description: Ability to manage health-related needs will improve  Outcome: Ongoing     Problem: Metabolic:  Goal: Ability to maintain appropriate glucose levels will improve  Description: Ability to maintain appropriate glucose levels will improve  Outcome: Ongoing     Problem: Nutritional:  Goal: Maintenance of adequate nutrition will improve  Description: Maintenance of adequate nutrition will improve  Outcome: Ongoing  Goal: Progress toward achieving an optimal weight will improve  Description: Progress toward achieving an optimal weight will improve  Outcome: Ongoing     Problem: Physical Regulation:  Goal: Complications related to the disease process, condition or treatment will be avoided or minimized  Description: Complications related to the disease process, condition or treatment will be avoided or minimized  Outcome: Ongoing  Goal: Diagnostic test results will improve  Description: Diagnostic test results will improve  Outcome: Ongoing     Problem: Skin Integrity:  Goal: Risk for impaired skin integrity will decrease  Description: Risk for impaired skin integrity will decrease  Outcome: Ongoing Goal: Skin integrity will be maintained  Description: Skin integrity will be maintained  Outcome: Ongoing  Goal: Skin integrity will improve  Description: Skin integrity will improve  Outcome: Ongoing     Problem: Tissue Perfusion:  Goal: Adequacy of tissue perfusion will improve  Description: Adequacy of tissue perfusion will improve  Outcome: Ongoing  Goal: Ability to maintain adequate tissue perfusion will improve  Description: Ability to maintain adequate tissue perfusion will improve  Outcome: Ongoing     Problem: Sensory:  Goal: Pain level will decrease  Description: Pain level will decrease  Outcome: Ongoing

## 2020-12-21 NOTE — CONSULTS
Asked to see patient by ER physician. Patient with poorly controlled diabetes. Developed pain and eventual drainage and bleeding from right thigh. Purulent drainage in the ER, opened further by ER physician. Patient has already been seen by medicine, nursing, and wound care. On exam the area is under a heavy panus, but appears to be well-drained, with no surrounding induration or fluctuance. Would recommend continued oral antibiotics, better blood sugar management/diabetic diet compliance, change dressing BID with dakins dampened gauze, and follow up in wound care in about 2 weeks.

## 2020-12-21 NOTE — PROGRESS NOTES
[facility]  Nurse  Consult Note       NAME:  Shelby Sarabia  MEDICAL RECORD NUMBER:  [id number]  AGE: 61 y.o. GENDER: female  : 1960  TODAY'S DATE:  2020    Subjective   Reason for Wound Nurse Evaluation and Assessment: Right Groin Abscess      Violeta Wilkins is a 61 y.o. female referred by:   [x] Physician  [x] Nursing  [] Other:     Wound Identification:  Wound Type: Abscess s/p I&D  Contributing Factors: poor glucose control and incontinence of urine    Wound History: Patient began having pain and noted this in the past week  Current Wound Care Treatment:  Packing & ABD    Patient Goal of Care:  [x] Wound Healing  [] Odor Control  [] Palliative Care  [x] Pain Control   [] Other:         PAST MEDICAL HISTORY        Diagnosis Date    Acquired hypothyroidism     Diabetes [facility]     Essential hypertension     Generalized osteoarthritis 3/21/2018    Hyperlipidemia     Hypertension     Hypothyroidism     Morbid obesity due to excess calories ([facility] 2018    Type 2 diabetes mellitus without complication, with long-term current use of insulin ([facility] [facility]        PAST SURGICAL HISTORY    Past Surgical History:   Procedure Laterality Date    CHOLECYSTECTOMY      TUBAL LIGATION         FAMILY HISTORY    Family History   Problem Relation Age of Onset    Diabetes Mother     Bipolar Disorder Mother        SOCIAL HISTORY    Social History     Tobacco Use    Smoking status: Never Smoker    Smokeless tobacco: Never Used   Substance Use Topics    Alcohol use: No    Drug use: No       ALLERGIES    Allergies   Allergen Reactions    Compazine [Prochlorperazine Maleate] Anaphylaxis       MEDICATIONS    No current facility-administered medications on file prior to encounter.       Current Outpatient Medications on File Prior to Encounter   Medication Sig Dispense Refill    zolpidem (AMBIEN) 10 MG tablet TAKE 1 TABLET BY MOUTH EVERY NIGHT AS NEEDED FOR SLEEP 90 tablet 0  meloxicam (MOBIC) 15 MG tablet Take 1 tablet by mouth once daily 90 tablet 3    DULoxetine (CYMBALTA) 30 MG extended release capsule Take 1 capsule by mouth once daily 90 capsule 3    levothyroxine (SYNTHROID) 200 MCG tablet Take 1 tablet by mouth daily 90 tablet 3    simvastatin (ZOCOR) 80 MG tablet TAKE ONE TABLET BY MOUTH ONCE DAILY 90 tablet 3    NOVOLIN R 100 UNIT/ML injection Sliding scale,  Use as directed - up to 150 units daily 9 vial 3    glucose blood VI test strips (FREESTYLE LITE) strip 1 each by In Vitro route 3 times daily As needed. 300 each 3    [DISCONTINUED] metFORMIN (GLUCOPHAGE) 1000 MG tablet TAKE ONE TABLET BY MOUTH TWICE DAILY WITH FOOD 180 tablet 3       Objective    /71   Pulse 90   Temp 98.1 °F (36.7 °C)   Resp 18   Ht 5' 7\" (1.702 m)   Wt 288 lb 6.4 oz (130.8 kg)   SpO2 95%   BMI 45.17 kg/m²     LABS:  WBC:    Lab Results   Component Value Date    WBC 8.1 12/21/2020     H/H:    Lab Results   Component Value Date    HGB 13.1 12/21/2020    HCT 39.5 12/21/2020     PTT:  No results found for: APTT, PTT[APTT}  PT/INR:  No results found for: PROTIME, INR  HgBA1c:    Lab Results   Component Value Date    LABA1C 11.4 12/20/2020       Assessment   Torito Risk Score: Torito Scale Score: 20    Patient Active Problem List   Diagnosis Code    Acquired hypothyroidism E03.9    Essential hypertension I10    Mixed hyperlipidemia E78.2    Type 2 diabetes mellitus without complication, with long-term current use of insulin ([address] E11.9, Z79.4    Generalized osteoarthritis M15.9    Morbid obesity due to excess calories ([address] E66.01    Primary insomnia F51.01    Abscess of right groin L02.214       Measurements:  Wound 12/21/20 Groin Right Abscess (Active)   Wound Image   12/21/20 0904   Wound Etiology Other 12/21/20 0904   Dressing Status New dressing applied;New drainage noted; Old drainage noted 12/21/20 0904 Wound Cleansed Irrigated with saline;Cleansed with saline 12/21/20 0904   Dressing/Treatment Packing;ABD 12/21/20 0904   Dressing Change Due 12/21/20 12/21/20 0904   Wound Length (cm) 1.5 cm 12/21/20 0904   Wound Width (cm) 3 cm 12/21/20 0904   Wound Depth (cm) 1.6 cm 12/21/20 0904   Wound Surface Area (cm^2) 4.5 cm^2 12/21/20 0904   Wound Volume (cm^3) 7.2 cm^3 12/21/20 0904   Undermining Starts ___ O'Clock 9 12/21/20 0904   Undermining Ends___ O'Clock 3 12/21/20 0904   Undermining Maxium Distance (cm) 2 12/21/20 0904   Wound Assessment Bleeding;Devitalized tissue;Eschar less than 20% 12/21/20 0904   Drainage Amount Moderate 12/21/20 0904   Drainage Description Sanguinous;Purulent 12/21/20 0904   Odor None 12/21/20 0904   Chantal-wound Assessment Edematous; Blanchable erythema 12/21/20 0904   Margins Unattached edges 12/21/20 0904   Number of days: 0            Response to treatment:  With complaints of pain.      Pain Assessment:  Severity:  4 / 10  Quality of pain: tender  Wound Pain Timing/Severity: intermittent  Premedicated: No    Plan   Plan of Care: Wound 12/21/20 Groin Right Abscess-Dressing/Treatment: Packing, ABD    Specialty Bed Required : N/A   [] Low Air Loss   [] Pressure Redistribution  [] Fluid Immersion  [] Bariatric  [] Total Pressure Relief  [] Other:     Current Diet: Diet NPO, After Midnight  Dietician consult:  No    Discharge Plan:  Placement for patient upon discharge: home with support    Patient appropriate for Outpatient [address]: Yes    Referrals:  []   [] [facility]  [] Supplies  [] Other    Patient/Caregiver Teaching:  Level of patient/caregiver understanding able to:   [] Indicates understanding       [] Needs reinforcement  [] Unsuccessful      [] Verbal Understanding  [] Demonstrated understanding       [] No evidence of learning  [] Refused teaching         [] N/A Wound care consultation completed with primary nurse present and assisting. Patient has abscess to the right groin area that was I&D in the E.D. at this admission. Patient states that she only noticed some pain in the area in the past week. There is still a small area of wet eschar at approximately 2-3 oclock with reamaining periwound red and slightly indurated. Undermining noted from 9-3 oclock. Wound is oozing blood with a moderate amount of drainage noted to the old dressing. No pulsations palpated in wound bed. Wound flushed with NS and periwound cleaned with NS. 1/2\" packing packed to fill undermining and depth of wound. Covered with dry 4x4 and ABD. Patient is due to be evaluated by general surgery later. Will plan to do NS flush and packing BID.     Electronically signed by   Brayan Evans RN, [facility] 12/21/2020

## 2020-12-21 NOTE — DISCHARGE INSTR - LAB
Set up with [facility]  [phone]. Maybe Wednesday before admission can be scheduled.

## 2020-12-21 NOTE — TELEPHONE ENCOUNTER
Pt currently at [facility] and has orders in for home care upon discharge. Dr. Vinicio Anaya OK to follow for home care needs/orders? Thanks!

## 2020-12-21 NOTE — PROGRESS NOTES
Pt's R thigh wound cleansed and packed/redresed with Emily Narvaez RN. Photos & measurements in Epic. Pt continues to ask for something to eat stating \"I've only had a turkey sandwich! \" & continues to explain how she \"is a diabetic and needs something to eat. \" I explained to her that she has to wait for Dr. Wisdom Sit to make rounds and see her. Her wound had bloody drainage in a large amount that had leaked from her wound onto her gown. Pt asking how long she has to be here.

## 2020-12-21 NOTE — PROGRESS NOTES
Pt's daughter was educated on how to do dressing change. Instructions were also written on pt's discharge papers so that she can easily understand. She verbalized that she \"should be able to this pretty easily but that she has a sister that's a pharmacist that should be able to answer any questions that she has. \" The pt was educated on the importance of maintaining her blood sugars so that she can heal better. She understands that [facility] will be calling to schedule a time for them to come and do an admission and the need to call [address] [place] [address],[address] tomorrow to schedule a follow up appointment. Enough supplies were sent home for the patient to do 6 dressing changes until [place] can get supplies ordered.

## 2020-12-21 NOTE — CARE COORDINATION
Spoke with patient regarding MD orders for [facility] services. Patient agreeable and has chosen [facility]. Referral Faxed. [address] [place] [address] [phone]. [phone]. Approved by Pratik Zarate  Avenue  Elizabeth Mejia on call , but may be Wednesday before they can admit due to the high volume of referrals over weekend. Notified Savana, floor nurse, and she was going to do the PM dressing change prior to discharge with daughter present for teaching. Will send home enough supplies in case [facility] is not able to admit until Wednesday. The Patient  was provided with a choice of provider and agrees with the discharge plan. [x] Yes [] No    Freedom of choice list was provided with basic dialogue that supports the patient's individualized plan of care/goals, treatment preferences and shares the quality data associated with the providers.  [x] Yes [] No  Electronically signed by Trudi Carrel, RN on 12/21/2020 at 3:30 PM

## 2020-12-22 ENCOUNTER — TELEPHONE (OUTPATIENT)
Dept: INTERNAL MEDICINE | Age: 60
End: 2020-12-22

## 2020-12-22 NOTE — TELEPHONE ENCOUNTER
[facility] Initial Follow Up Call    Outreach made within 2 business days of discharge: Yes    Patient: Lindy Lucero   Patient : 1960        MRN: [id number]    Reason for Admission: Admitted 20 for cellulitis and abscess of leg   Discharge Date: 20    Discharge Diagnoses   Principal Problem:    Abscess of right groin  Active Problems:    Acquired hypothyroidism    Essential hypertension    Mixed hyperlipidemia    Morbid obesity due to excess calories ([facility]     Spoke with: attempted to reach patient, no answer. I was unable to leave a message with intent of my call. I will reach out again next business day.      Discharge department/facility: [facility]     Scheduled appointment with PCP within 7-14 days    Follow Up  Future Appointments   Date Time Provider Jessica Driver   2020  2:30 PM Armando Ribera MD [address] [place] [address]

## 2020-12-23 ENCOUNTER — TELEPHONE (OUTPATIENT)
Dept: INTERNAL MEDICINE | Age: 60
End: 2020-12-23

## 2020-12-23 NOTE — TELEPHONE ENCOUNTER
Her daughter, Killian Guerrero, would like to discuss some issues going on with her mom, Kaci Dexter. No details given,except it may have to do with her mental health.

## 2020-12-23 NOTE — TELEPHONE ENCOUNTER
[facility] Initial Follow Up Call    Outreach made within 2 business days of discharge: Yes    Patient: Segundo Terry      Patient : 1960        MRN: [id number]    Reason for Admission: Admitted 20 for cellulitis and abscess of leg   Discharge Date: 20        Discharge Diagnoses   Principal Problem:    Abscess of right groin  Active Problems:    Acquired hypothyroidism    Essential hypertension    Mixed hyperlipidemia    Morbid obesity due to excess calories ([facility]                Spoke with: second attempt to reach patient, no answer.  I was unable to make contact with her at this time.      Discharge department/facility: [facility]     Scheduled appointment with PCP within 7-14 days    Follow Up  Future Appointments   Date Time Provider Jessica Driver   2020  2:30 PM MD [facility] [place]   2021  1:00 PM STANFORD Campos CNP [facility] [address], [place]

## 2020-12-24 ENCOUNTER — TELEPHONE (OUTPATIENT)
Dept: CASE MANAGEMENT | Age: 60
End: 2020-12-24

## 2020-12-24 LAB
GRAM STAIN RESULT: ABNORMAL
ORGANISM: ABNORMAL
ORGANISM: ABNORMAL
WOUND/ABSCESS: ABNORMAL
WOUND/ABSCESS: ABNORMAL

## 2020-12-24 NOTE — TELEPHONE ENCOUNTER
From Dyana Hodge, OT with [facility]) Homecare:  Please notify Dr. Reina Ng of the following:  S:  OT orders  B:  Pt of Dr. Reina Ng  A:  Pt declines OT evaluation at this time; she verbalizes understanding re: OT evaluation must occur for PCA services to be considered. She states her daughter can and will assist her with bathing routine and declines OT evaluation.   R: Cancel OT evaluation per pt request.  Electronically signed by Jhoana Cifuentes RN on 12/24/2020 at 11:52 AM

## 2020-12-26 ENCOUNTER — NURSE TRIAGE (OUTPATIENT)
Dept: CALL CENTER | Facility: HOSPITAL | Age: 60
End: 2020-12-26

## 2020-12-26 NOTE — TELEPHONE ENCOUNTER
"Reviewed guideline advises home care and recheck BG. Caller states her mother is eating a PBJ sandwich with a glass of milk. Will call back if BG not improved after the snack.     Reason for Disposition  • [1] Blood glucose < 70 mg/dL (3.9 mmol/L) or symptomatic AND [2] cause known    Additional Information  • Negative: Unconscious or difficult to awaken  • Negative: Seizure occurs  • Negative: Acting confused (e.g., disoriented, slurred speech)  • Negative: Very weak (e.g., can't stand)  • Negative: Sounds like a life-threatening emergency to the triager  • Negative: [1] Vomiting AND [2] signs of dehydration (e.g., very dry mouth, lightheaded, dark urine)  • Negative: [1] Low blood sugar symptoms persist > 30 minutes AND [2] using low blood sugar Care Advice  • Negative: [1] Low blood glucose (< 70 mg/dL  or 3.9 mmol/L) persists > 30 minutes AND [2] using low blood sugar Care Advice  • Negative: Patient sounds very sick or weak to the triager  • Negative: [1] Low blood sugar symptoms with no other adult present AND [2] hasn't tried Care Advice  • Negative: [1] Low blood glucose (< 70 mg/dL  or 3.9 mmol/L) with no other adult present AND [2] hasn't tried Care Advice  • Negative: Diabetes drug error or overdose (e.g., insulin error or extra dose)  • Negative: [1] Caller has URGENT medication or insulin pump question AND [2] triager unable to answer question  • Negative: [1] Blood glucose < 70  mg/dL (3.9 mmol/L) or symptomatic, now improved with Care Advice AND [2] cause unknown  • Negative: [1] Caller has NON-URGENT medication or insulin pump question AND [2] triager unable to answer question  • Negative: [1] Morning (before breakfast) blood glucose < 80 mg/dL (4.4 mmol/L) AND [2] more than once in past week  • Negative: [1] Evening (after bedtime snack) blood glucose < 100 mg/dL (5.6 mmol/L) AND [2] more than once in past week    Answer Assessment - Initial Assessment Questions  1. SYMPTOMS: \"What symptoms are you " "concerned about?\"      asymptomatic  2. ONSET:  \"When did the symptoms start?\"      na  3. BLOOD GLUCOSE: \"What is your blood glucose level?\"       45  4. USUAL RANGE: \"What is your blood glucose level usually?\" (e.g., usual fasting morning value, usual evening value)      Low 200's  5. TYPE 1 or 2:  \"Do you know what type of diabetes you have?\"  (e.g., Type 1, Type 2, Gestational; doesn't know)       Type 1  6. INSULIN: \"Do you take insulin?\" \"What type of insulin(s) do you use? What is the mode of delivery? (syringe, pen; injection or pump) \"When did you last give yourself an insulin dose?\" (i.e., time or hours/minutes ago) \"How much did you give?\" (i.e., how many units)      Humulin N and R, has not had her insulin today  7. DIABETES PILLS: \"Do you take any pills for your diabetes?\"      no  8. OTHER SYMPTOMS: \"Do you have any symptoms?\" (e.g., fever, frequent urination, difficulty breathing, vomiting)      no  9. LOW BLOOD GLUCOSE TREATMENT: \"What have you done so far to treat the low blood glucose level?\"      Ate peanut butter jelly sandwich and milk  10. FOOD: \"When did you last eat or drink?\"        15 minutes ago   11. ALONE: \"Are you alone right now or is someone with you?\"         Daughter is with her  12. PREGNANCY: \"Is there any chance you are pregnant?\" \"When was your last menstrual period?\"        na    Protocols used: DIABETES - LOW BLOOD SUGAR-ADULT-AH      "

## 2020-12-28 NOTE — PROGRESS NOTES
Mom notified Physician Progress Note      Cali Swenson  CSN #:                  [id number]  :                       1960  ADMIT DATE:       2020 2:20 PM  [address] DATE:        2020 5:26 PM  RESPONDING  PROVIDER #:        Bryan France MD          QUERY TEXT:    Pt admitted with right groin abscess. .  Pt noted to have Uncontrolled   diabetes. Please document in progress notes and discharge summary the likely   cause of ulcer: The medical record reflects the following:  Risk Factors: Morbid obesity, Diabetes,  Clinical Indicators:    Treatment: Insulin, Labs, Monitoring, Zosyn, Vanco    Thank you    Tanna Mcgowan RN, BSN, [facility]  [phone]  Options provided:  -- right groin abscess due to diabetes  -- right groin abscess due to cellulitis  -- Other - I will add my own diagnosis  -- Disagree - Not applicable / Not valid  -- Disagree - Clinically unable to determine / Unknown  -- Refer to Clinical Documentation Reviewer    PROVIDER RESPONSE TEXT:    This patient has right groin abscess due to diabetes.     Query created by: Jose Antonio Sinclair on 2020 10:28 AM      Electronically signed by:  Bryan France MD 2020 12:41 PM

## 2020-12-28 NOTE — TELEPHONE ENCOUNTER
See if [facility] could have SW check on pt -- check on living situation and safety and pt needs help to get correct meds--- when I see her tomorrow- we will address DM meds and may need help to fill out med assist forms  (((someone called worried about home situation)))

## 2020-12-29 ENCOUNTER — TELEPHONE (OUTPATIENT)
Dept: INTERNAL MEDICINE | Age: 60
End: 2020-12-29

## 2020-12-29 ENCOUNTER — OFFICE VISIT (OUTPATIENT)
Dept: INTERNAL MEDICINE | Age: 60
End: 2020-12-29
Payer: MEDICARE

## 2020-12-29 VITALS
WEIGHT: 293 LBS | HEART RATE: 98 BPM | BODY MASS INDEX: 45.99 KG/M2 | HEIGHT: 67 IN | SYSTOLIC BLOOD PRESSURE: 122 MMHG | OXYGEN SATURATION: 97 % | DIASTOLIC BLOOD PRESSURE: 78 MMHG

## 2020-12-29 DIAGNOSIS — I10 ESSENTIAL HYPERTENSION: ICD-10-CM

## 2020-12-29 DIAGNOSIS — E66.01 MORBID OBESITY DUE TO EXCESS CALORIES (HCC): ICD-10-CM

## 2020-12-29 DIAGNOSIS — E11.65 UNCONTROLLED TYPE 2 DIABETES MELLITUS WITH HYPERGLYCEMIA (HCC): ICD-10-CM

## 2020-12-29 DIAGNOSIS — F09 COGNITIVE DYSFUNCTION: ICD-10-CM

## 2020-12-29 DIAGNOSIS — L02.214 ABSCESS OF RIGHT GROIN: Primary | ICD-10-CM

## 2020-12-29 DIAGNOSIS — E11.9 TYPE 2 DIABETES MELLITUS WITHOUT COMPLICATION, WITH LONG-TERM CURRENT USE OF INSULIN (HCC): ICD-10-CM

## 2020-12-29 DIAGNOSIS — Z79.4 TYPE 2 DIABETES MELLITUS WITHOUT COMPLICATION, WITH LONG-TERM CURRENT USE OF INSULIN (HCC): ICD-10-CM

## 2020-12-29 DIAGNOSIS — F33.1 MODERATE EPISODE OF RECURRENT MAJOR DEPRESSIVE DISORDER (HCC): ICD-10-CM

## 2020-12-29 PROCEDURE — 3017F COLORECTAL CA SCREEN DOC REV: CPT | Performed by: INTERNAL MEDICINE

## 2020-12-29 PROCEDURE — 1111F DSCHRG MED/CURRENT MED MERGE: CPT | Performed by: INTERNAL MEDICINE

## 2020-12-29 PROCEDURE — G8484 FLU IMMUNIZE NO ADMIN: HCPCS | Performed by: INTERNAL MEDICINE

## 2020-12-29 PROCEDURE — G8427 DOCREV CUR MEDS BY ELIG CLIN: HCPCS | Performed by: INTERNAL MEDICINE

## 2020-12-29 PROCEDURE — 1036F TOBACCO NON-USER: CPT | Performed by: INTERNAL MEDICINE

## 2020-12-29 PROCEDURE — 2022F DILAT RTA XM EVC RTNOPTHY: CPT | Performed by: INTERNAL MEDICINE

## 2020-12-29 PROCEDURE — G8417 CALC BMI ABV UP PARAM F/U: HCPCS | Performed by: INTERNAL MEDICINE

## 2020-12-29 PROCEDURE — 99215 OFFICE O/P EST HI 40 MIN: CPT | Performed by: INTERNAL MEDICINE

## 2020-12-29 PROCEDURE — 3046F HEMOGLOBIN A1C LEVEL >9.0%: CPT | Performed by: INTERNAL MEDICINE

## 2020-12-29 RX ORDER — INSULIN ASPART 100 [IU]/ML
INJECTION, SOLUTION INTRAVENOUS; SUBCUTANEOUS
Qty: 5 PEN | Refills: 3 | Status: SHIPPED | OUTPATIENT
Start: 2020-12-29 | End: 2021-01-11 | Stop reason: SDUPTHER

## 2020-12-29 RX ORDER — DULAGLUTIDE 0.75 MG/.5ML
0.75 INJECTION, SOLUTION SUBCUTANEOUS WEEKLY
Qty: 15 PEN | Refills: 3 | Status: SHIPPED | OUTPATIENT
Start: 2020-12-29 | End: 2021-01-04

## 2020-12-29 RX ORDER — INSULIN DEGLUDEC 200 U/ML
75 INJECTION, SOLUTION SUBCUTANEOUS NIGHTLY
Qty: 15 PEN | Refills: 3 | Status: SHIPPED | OUTPATIENT
Start: 2020-12-29 | End: 2021-01-04

## 2020-12-29 RX ORDER — INSULIN ASPART 100 [IU]/ML
INJECTION, SOLUTION INTRAVENOUS; SUBCUTANEOUS
Qty: 5 PEN | Refills: 3 | Status: SHIPPED | OUTPATIENT
Start: 2020-12-29 | End: 2020-12-29 | Stop reason: SDUPTHER

## 2020-12-29 SDOH — ECONOMIC STABILITY: INCOME INSECURITY: HOW HARD IS IT FOR YOU TO PAY FOR THE VERY BASICS LIKE FOOD, HOUSING, MEDICAL CARE, AND HEATING?: SOMEWHAT HARD

## 2020-12-29 SDOH — ECONOMIC STABILITY: FOOD INSECURITY: WITHIN THE PAST 12 MONTHS, THE FOOD YOU BOUGHT JUST DIDN'T LAST AND YOU DIDN'T HAVE MONEY TO GET MORE.: NEVER TRUE

## 2020-12-29 SDOH — ECONOMIC STABILITY: FOOD INSECURITY: WITHIN THE PAST 12 MONTHS, YOU WORRIED THAT YOUR FOOD WOULD RUN OUT BEFORE YOU GOT MONEY TO BUY MORE.: NEVER TRUE

## 2020-12-29 NOTE — PROGRESS NOTES
Chief Complaint   Patient presents with    Follow-Up from Hospital     TCM - due to groin abcess       HPI:   This is a high level TCM visit for recent mission to the hospital for groin abscess. Patient comes with in with her daughter we did a dressing change while in the room. Patient has not been seen in this office in a year or so blood sugar has been out of control for a long time we have gotten it in control before but she goes back to an older regimen of insulin due to the cost of the newer therapies. Patient has seemed a motivated for diabetes control family are concerned about her cognitive function mood etc. blood sugar running very high. nph 75 nightly R10 units for each 100 over 100    Past Medical History:   Diagnosis Date    Acquired hypothyroidism     Diabetes ([address]     Episode of recurrent major depressive disorder ([address] 1/10/2021    Generalized osteoarthritis 3/21/2018    Hypothyroidism     Morbid obesity due to excess calories ([address] 6/24/2018    Movement disorder     joint pain    Type 2 diabetes mellitus without complication, with long-term current use of insulin (HCC)        Past Surgical History:   Procedure Laterality Date    CHOLECYSTECTOMY      TUBAL LIGATION         Family History   Problem Relation Age of Onset    Diabetes Mother     Bipolar Disorder Mother        Social History     Socioeconomic History    Marital status:      Spouse name: Not on file    Number of children: Not on file    Years of education: Not on file    Highest education level: Not on file   Occupational History    Not on file   Social Needs    Financial resource strain: Somewhat hard    Food insecurity     Worry: Never true     Inability: Never true    Transportation needs     Medical: Not on file     Non-medical: Not on file   Tobacco Use    Smoking status: Never Smoker    Smokeless tobacco: Never Used   Substance and Sexual Activity    Alcohol use: No    Drug use:  No  Sexual activity: Yes     Partners: Male   Lifestyle    Physical activity     Days per week: Not on file     Minutes per session: Not on file    Stress: Not on file   Relationships    Social connections     Talks on phone: Not on file     Gets together: Not on file     Attends Congregational service: Not on file     Active member of club or organization: Not on file     Attends meetings of clubs or organizations: Not on file     Relationship status: Not on file    Intimate partner violence     Fear of current or ex partner: Not on file     Emotionally abused: Not on file     Physically abused: Not on file     Forced sexual activity: Not on file   Other Topics Concern    Not on file   Social History Narrative    Not on file       Allergies   Allergen Reactions    Compazine [Prochlorperazine Maleate] Anaphylaxis       Current Outpatient Medications   Medication Sig Dispense Refill    insulin aspart (NOVOLOG FLEXPEN) 100 UNIT/ML injection pen Take 6 units for bs 250-300, 12 units for bs 300-350, 18 units for bs over 350 5 pen 3    insulin glargine (BASAGLAR KWIKPEN) 100 UNIT/ML injection pen Inject 75 Units into the skin nightly 5 pen 3    Dulaglutide (TRULICITY) 1.66 EG/1.3JC SOPN Inject 0.75 mg into the skin once a week 15 pen 3    zolpidem (AMBIEN) 10 MG tablet TAKE 1 TABLET BY MOUTH EVERY NIGHT AS NEEDED FOR SLEEP 30 tablet 1    sodium hypochlorite (DAKINS) 0.125 % SOLN external solution Apply topically daily 473 mL 0    meloxicam (MOBIC) 15 MG tablet Take 1 tablet by mouth once daily 90 tablet 3    DULoxetine (CYMBALTA) 30 MG extended release capsule Take 1 capsule by mouth once daily 90 capsule 3    levothyroxine (SYNTHROID) 200 MCG tablet Take 1 tablet by mouth daily 90 tablet 3    simvastatin (ZOCOR) 80 MG tablet TAKE ONE TABLET BY MOUTH ONCE DAILY 90 tablet 3    glucose blood VI test strips (FREESTYLE LITE) strip 1 each by In Vitro route 3 times daily As needed.  300 each 3 No current facility-administered medications for this visit. Review of Systems   Constitutional: Positive for fatigue. Negative for chills and fever. HENT: Negative for congestion and sinus pressure. Eyes: Negative for discharge and redness. Respiratory: Negative for cough and shortness of breath. Cardiovascular: Positive for leg swelling. Negative for chest pain and palpitations. Gastrointestinal: Negative for abdominal distention and abdominal pain. Genitourinary: Negative for dysuria, frequency and urgency. Musculoskeletal: Positive for arthralgias. Negative for back pain. Skin: Negative for rash and wound. Neurological: Negative for dizziness, light-headedness and headaches. Psychiatric/Behavioral: Positive for decreased concentration, dysphoric mood and sleep disturbance. The patient is not nervous/anxious. Patient with some cognitive dysfunction flat depressed affect       /78   Pulse 98   Ht 5' 7\" (1.702 m)   Wt 299 lb (135.6 kg)   SpO2 97%   BMI 46.83 kg/m²   BP Readings from Last 7 Encounters:   01/04/21 (!) 143/76   12/29/20 122/78   12/21/20 (!) 155/81   12/20/20 131/75   02/10/20 132/80   12/19/19 120/60   03/01/19 138/80     Wt Readings from Last 7 Encounters:   01/04/21 299 lb (135.6 kg)   12/29/20 299 lb (135.6 kg)   12/20/20 288 lb 6.4 oz (130.8 kg)   12/20/20 299 lb (135.6 kg)   02/10/20 (!) 317 lb (143.8 kg)   03/01/19 (!) 317 lb (143.8 kg)   06/22/18 (!) 313 lb (142 kg)     BMI Readings from Last 7 Encounters:   01/04/21 46.83 kg/m²   12/29/20 46.83 kg/m²   12/20/20 45.17 kg/m²   12/20/20 46.83 kg/m²   02/10/20 49.65 kg/m²   03/01/19 49.65 kg/m²   06/22/18 49.02 kg/m²     Resp Readings from Last 7 Encounters:   01/04/21 20   12/21/20 20   12/20/20 20   03/15/18 20   12/28/17 20   11/20/16 18   10/20/16 18       Physical Exam  Constitutional:       General: She is not in acute distress. Eyes:      General: No scleral icterus.   Neck: Sodium 134 (L) 136 - 145 mmol/L    Potassium reflex Magnesium 4.0 3.5 - 5.0 mmol/L    Chloride 96 (L) 98 - 111 mmol/L    CO2 25 22 - 29 mmol/L    Anion Gap 13 7 - 19 mmol/L    Glucose 339 (H) 74 - 109 mg/dL    BUN 7 (L) 8 - 23 mg/dL    CREATININE 0.5 0.5 - 0.9 mg/dL    GFR Non-African American >60 >60    GFR African American >59 >59    Calcium 9.0 8.8 - 10.2 mg/dL    Total Protein 7.3 6.6 - 8.7 g/dL    Alb 3.9 3.5 - 5.2 g/dL    Total Bilirubin 0.6 0.2 - 1.2 mg/dL    Alkaline Phosphatase 114 (H) 35 - 104 U/L    ALT 14 5 - 33 U/L    AST 20 5 - 32 U/L   Urinalysis Reflex to Culture    Specimen: Urine, clean catch   Result Value Ref Range    Color, UA YELLOW Straw/Yellow    Clarity, UA Clear Clear    Glucose, Ur =>1000 Negative mg/dL    Bilirubin Urine Negative Negative    Ketones, Urine TRACE (A) Negative mg/dL    Specific Gravity, UA 1.031 1.005 - 1.030    Blood, Urine Negative Negative    pH, UA 5.5 5.0 - 8.0    Protein, UA Negative Negative mg/dL    Urobilinogen, Urine 0.2 <2.0 E.U./dL    Nitrite, Urine Negative Negative    Leukocyte Esterase, Urine Negative Negative   Sedimentation Rate   Result Value Ref Range    Sed Rate 32 (H) 0 - 25 mm/Hr   C-Reactive Protein   Result Value Ref Range    CRP 6.85 (H) 0.00 - 0.50 mg/dL   C-Reactive Protein   Result Value Ref Range    CRP 6.49 (H) 0.00 - 0.50 mg/dL   Hemoglobin A1c   Result Value Ref Range    Hemoglobin A1C 11.4 (H) 4.0 - 6.0 %   CBC   Result Value Ref Range    WBC 8.1 4.8 - 10.8 K/uL    RBC 4.30 4.20 - 5.40 M/uL    Hemoglobin 13.1 12.0 - 16.0 g/dL    Hematocrit 39.5 37.0 - 47.0 %    MCV 91.9 81.0 - 99.0 fL    MCH 30.5 27.0 - 31.0 pg    MCHC 33.2 33.0 - 37.0 g/dL    RDW 13.1 11.5 - 14.5 %    Platelets 168 866 - 900 K/uL    MPV 9.5 9.4 - 12.3 fL   Basic Metabolic Panel w/ Reflex to MG   Result Value Ref Range    Sodium 137 136 - 145 mmol/L    Potassium reflex Magnesium 3.9 3.5 - 5.0 mmol/L    Chloride 101 98 - 111 mmol/L    CO2 24 22 - 29 mmol/L Anion Gap 12 7 - 19 mmol/L    Glucose 327 (H) 74 - 109 mg/dL    BUN 9 8 - 23 mg/dL    CREATININE 0.6 0.5 - 0.9 mg/dL    GFR Non-African American >60 >60    GFR African American >59 >59    Calcium 8.5 (L) 8.8 - 10.2 mg/dL   POCT GLUCOSE   Result Value Ref Range    Glucose 239 mg/dL    QC OK? yes    POCT Glucose   Result Value Ref Range    POC Glucose 239 (H) 70 - 99 mg/dl    Performed on AccuChek    POCT Glucose   Result Value Ref Range    POC Glucose 321 (H) 70 - 99 mg/dl    Performed on AccuChek    POCT Glucose   Result Value Ref Range    POC Glucose 297 (H) 70 - 99 mg/dl    Performed on AccuChek    POCT Glucose   Result Value Ref Range    POC Glucose 253 (H) 70 - 99 mg/dl    Performed on AccuChek    POCT Glucose   Result Value Ref Range    POC Glucose 251 (H) 70 - 99 mg/dl    Performed on AccuChek    POCT Glucose   Result Value Ref Range    POC Glucose 235 (H) 70 - 99 mg/dl    Performed on AccuChek        ASSESSMENT/ PLAN:  1. Abscess of right groin  We did a dressing change with the patient's daughter had to pack the wound with a Q-tip but apparently much smaller than it had been there is no foul odor the tissue looks pink and appropriate we will have the home health wound care nurse follow and she is going to see wound care clinic. We went through a full dressing change with the daughter.     2. Type 2 diabetes mellitus without complication, with long-term current use of insulin ([facility] We spent a very extended period of time trying to sort through her diabetes she gets NPH and regular insulin without a prescription uses these I do not feel that she uses them appropriately she does not eat appropriately sometimes eats 1 meal a day snack skips meals blood sugar is entirely out of control for years now and she has been resistant to changes in insulin due to cost and to instructions regarding diabetic diet. We are going to try to get her on Trulicity [place] and NovoLog. Over 45 minutes spent in the room today working on getting her meds adjusted situated in a plan of care the risk of hypoglycemia has been emphasized as has the risk of ongoing hyperglycemia  - insulin aspart (NOVOLOG FLEXPEN) 100 UNIT/ML injection pen; Take 6 units for bs 250-300, 12 units for bs 300-350, 18 units for bs over 350  Dispense: 5 pen; Refill: 3    3. Morbid obesity due to excess calories [facility])  Patient needs to lose weight it is of utmost importance her obesity has contributed to other medical problems including this wound. 4. Essential hypertension  Blood pressure is elevated today we are going to review the hospital records have home health help us monitor her blood pressure she is not on any medication we will review her lab and get her possibly on ACE inhibitor or angiotensin receptor blocker but first get these other conditions adjusted    5. Moderate episode of recurrent major depressive disorder ([address]  Patient seems very depressed she is on Cymbalta partially for diabetic neuropathy we encouraged her she really denies depression but she has seemed a motivated now for a long period of time    6.  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 Uncontrolled type 2 diabetes mellitus with hyperglycemia ([facility]  Of utmost importance is getting her diabetes in control we cannot emphasize this in half.   We have really encouraged her to follow a diabetic diet to eat routinely to have a better schedule, going to work with our [address] workers and others to get her Cornelius Kila or similar insulin in addition to [employer] and Trulicity    45 plus minutes spent on care on day of service

## 2020-12-30 ENCOUNTER — CARE COORDINATION (OUTPATIENT)
Dept: CARE COORDINATION | Age: 60
End: 2020-12-30

## 2020-12-30 ENCOUNTER — TELEPHONE (OUTPATIENT)
Dept: INTERNAL MEDICINE | Age: 60
End: 2020-12-30

## 2020-12-30 RX ORDER — ZOLPIDEM TARTRATE 10 MG/1
TABLET ORAL
Qty: 30 TABLET | Refills: 1 | Status: SHIPPED | OUTPATIENT
Start: 2020-12-30 | End: 2021-02-21 | Stop reason: SDUPTHER

## 2020-12-30 NOTE — CARE COORDINATION
Patient Calls    Carmencita Moncada LPN routed conversation to You 1 hour ago (12:44 PM)      Zev Cifuentes MD routed conversation to [address] [place] [address] Internal Medicine Clinical Staff 2 hours ago (12:12 PM)      Zev Cifuentes MD 2 hours ago (12:11 PM)        This patient would really benefit from [place] insulin and from [address] Apparently the Tresiba $1600 I am not sure if she has no drug coverage or what the issue is (says tier3/5 whenI put these meds in) she is on Medicare and it says she has  secondary insurance. 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 ([facility] sw checking also and may be checking on meds)  It sounds like there may be some hoarding or clutter in the home from the other daughter. I mainly need help to try to get her the right medications her diabetes is totally out of control and if I can get her on Tresiba and Trulicity or something similar I feel like we can get her in better control we did in the past when we were able to give her samples. Could you help me see it what we could do I have referred her to [facility] before but for some reason that did not work out. Been really having trouble managing her because of these limitations. Thank you         Documentation      Submitted by Rafaela/ROYA    Telephoned patient to follow-up on need for medication assistance. Asked patient for Part D insurance information:    - [employer] / CMS -[id number]  - I asked the patient to present her [employer] card to the [address] [place] [address] the next time she checks in for an appointment. The card needs to be scanned in her medical record. Reviewed patient's formulary through [email or web address]. Current year's drug formulary for [place] and Trulicity are the same during the initial phase:    - 30 Day supply is $47 at a Retail Pharmacy.   - 90 Day supply is $141 through [facility]. Patient sad that is not what she was told to pay. I asked if she was in the \"donut hole\". She stated she was. Explained the four phases of drug coverage:    - Phase 1 - Deductible Phase / A patient may have to pay the deductible up front before insurance will pay for medication, or the deductible is applied to certain tiers of drugs so the deductible is stretched out over a couple of month. - Phase 2 - Initial Phase / Insurance pays more for medication costs than the patient.  - Phase 3 - Gap Coverage (aka Donut Hole) / Patient pays more for medication than the insurance carrier.  - Phase 4 - Catastrophic Coverage / Insurance pays for most of medication costs. Patient stated no need to have coverage based on the above information. I told her Medicare expects its beneficiaries to have Part D coverage just like the [employer] expects vehicle owners to carry car insurance. If a beneficiary does not have prescription coverage, he/she will incur a monthly penalty imposed by Medicare for each month the beneficiary does not have Part D coverage. The penalty is a lifetime penalty. It will never go away. Reviewed [facility]'s 2021 SilverScript coverage for [place] and Trulicity during the Initial Coverage (Phase):    - 2021 Deductible is $325.  - Deductible applied to all tiers after Tiers 1 & 2.  - [place] [employer] and Trulicity costs are the same during the Initial Phase:   - 30 Day supply $35 at [facility]. - 80 Day supply $105 Mail Order. Asked the patient to contact  [place] [address] to apply for  assistance. Patient stated she worked with [place] [address] earlier this year. She said they mailed her a packet of paperwork to complete but she lost the paperwork. They sent her another set of paperwork. She said she lost that packet too.     I asked her to call [facility] to start over, and to be conscientious about getting paperwork completed and returned to [place] [address] promptly. Offered to give the patient [facility]'s number but she said she has it.     Submitted by Rafaela/ROYA

## 2020-12-30 NOTE — TELEPHONE ENCOUNTER
This patient would really benefit from [place] insulin and from Trulicity. Apparently the Tresiba $1600 I am not sure if she has no drug coverage or what the issue is (says tier3/5 whenI put these meds in) she is on Medicare and it says she has  secondary insurance. 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 ([facility] sw checking also and may be checking on meds)  It sounds like there may be some hoarding or clutter in the home from the other daughter. I mainly need help to try to get her the right medications her diabetes is totally out of control and if I can get her on Tresiba and Trulicity or something similar I feel like we can get her in better control we did in the past when we were able to give her samples. Could you help me see it what we could do I have referred her to [facility] before but for some reason that did not work out. Been really having trouble managing her because of these limitations.   Thank you

## 2020-12-30 NOTE — TELEPHONE ENCOUNTER
Her daughter voiced understanding of use reg. Insulin 75 units every night and the trulicity once a week. And they are seeing Nayeli Abebe today

## 2020-12-31 ENCOUNTER — TELEPHONE (OUTPATIENT)
Dept: INTERNAL MEDICINE CLINIC | Age: 60
End: 2020-12-31

## 2020-12-31 NOTE — TELEPHONE ENCOUNTER
Tony crane saw pt and dt, ROCKY ARMAS, yesterday and sw is assisting them in applying to pharmaceutical companies for [employer], [facility] and [place].  Laura will follow with additional visits at monthly for 2 months

## 2021-01-04 ENCOUNTER — HOSPITAL ENCOUNTER (OUTPATIENT)
Dept: WOUND CARE | Age: 61
Discharge: HOME | End: 2021-01-04
Payer: MEDICARE

## 2021-01-04 ENCOUNTER — TELEPHONE (OUTPATIENT)
Dept: INTERNAL MEDICINE | Age: 61
End: 2021-01-04

## 2021-01-04 VITALS
BODY MASS INDEX: 45.99 KG/M2 | HEART RATE: 98 BPM | HEIGHT: 67 IN | DIASTOLIC BLOOD PRESSURE: 76 MMHG | RESPIRATION RATE: 20 BRPM | TEMPERATURE: 97.2 F | WEIGHT: 293 LBS | SYSTOLIC BLOOD PRESSURE: 143 MMHG

## 2021-01-04 DIAGNOSIS — E11.65 UNCONTROLLED TYPE 2 DIABETES MELLITUS WITH HYPERGLYCEMIA (HCC): ICD-10-CM

## 2021-01-04 DIAGNOSIS — E11.9 TYPE 2 DIABETES MELLITUS WITHOUT COMPLICATION, WITH LONG-TERM CURRENT USE OF INSULIN (HCC): ICD-10-CM

## 2021-01-04 DIAGNOSIS — E66.01 MORBID OBESITY DUE TO EXCESS CALORIES (HCC): ICD-10-CM

## 2021-01-04 DIAGNOSIS — Z91.199 NONCOMPLIANCE: ICD-10-CM

## 2021-01-04 DIAGNOSIS — L02.214 ABSCESS OF RIGHT GROIN: Primary | ICD-10-CM

## 2021-01-04 DIAGNOSIS — F32.89 OTHER DEPRESSION: ICD-10-CM

## 2021-01-04 DIAGNOSIS — L98.492 RIGHT GROIN ULCER, WITH FAT LAYER EXPOSED (HCC): ICD-10-CM

## 2021-01-04 DIAGNOSIS — F09 COGNITIVE AND NEUROBEHAVIORAL DYSFUNCTION: Primary | ICD-10-CM

## 2021-01-04 DIAGNOSIS — Z79.4 TYPE 2 DIABETES MELLITUS WITHOUT COMPLICATION, WITH LONG-TERM CURRENT USE OF INSULIN (HCC): ICD-10-CM

## 2021-01-04 DIAGNOSIS — Z87.820 HISTORY OF CLOSED HEAD INJURY: ICD-10-CM

## 2021-01-04 PROCEDURE — 99213 OFFICE O/P EST LOW 20 MIN: CPT

## 2021-01-04 PROCEDURE — 97597 DBRDMT OPN WND 1ST 20 CM/<: CPT

## 2021-01-04 PROCEDURE — 97597 DBRDMT OPN WND 1ST 20 CM/<: CPT | Performed by: NURSE PRACTITIONER

## 2021-01-04 RX ORDER — LIDOCAINE HYDROCHLORIDE 20 MG/ML
JELLY TOPICAL PRN
Status: DISCONTINUED | OUTPATIENT
Start: 2021-01-04 | End: 2021-01-06 | Stop reason: HOSPADM

## 2021-01-04 ASSESSMENT — VISUAL ACUITY: OU: 1

## 2021-01-04 NOTE — TELEPHONE ENCOUNTER
Her daughter is calling about bipolar disorder. She is staying in her room and [address] feels like patient doesn't care if lives or dies. Can something be added to cymbalta for bipolar?

## 2021-01-04 NOTE — PROGRESS NOTES
Patient Care Team:  Maribell Baxter MD as PCP - General (Internal Medicine)  Maribell Baxter MD as PCP - [facility]  Alvaro Reece as Health     TODAY'S DATE:  1/4/2021 12/20/20- Dr. Anderson Vallejo  I/d of right groin  Bacteria: actinomyces turicensis     HISTORY of PRESENTILLNESS HPI   Brandy Galloway is a 61 y.o. female who presents today for wound evaluation. Wound Type:surgical  Wound Location:right groin  Modifying factors:diabetes, poor glucose control, obesity and non-adherence    Patient Active Problem List   Diagnosis Code    Acquired hypothyroidism E03.9    Essential hypertension I10    Mixed hyperlipidemia E78.2    Type 2 diabetes mellitus without complication, with long-term current use of insulin ([address] E11.9, Z79.4    Generalized osteoarthritis M15.9    Morbid obesity due to excess calories ([facility]) E66.01    Primary insomnia F51.01    Abscess of right groin L02.214    Right groin ulcer, with fat layer exposed ([address] L98.492    Noncompliance Z91.19       She reports she developed a wound on right groin. This started 2 week(s) ago. She believes this is  healing. She has been applying Dakins moistened gauze. She has not had  fever orchills. She has a history of diabetes mellitus.     Brandy Galloway is a 61 y.o. female with the following history reviewed and recorded in [facility]:    Current Outpatient Medications   Medication Sig Dispense Refill    zolpidem (AMBIEN) 10 MG tablet TAKE 1 TABLET BY MOUTH EVERY NIGHT AS NEEDED FOR SLEEP 30 tablet 1    insulin aspart (NOVOLOG FLEXPEN) 100 UNIT/ML injection pen Take 6 units for bs 250-300, 12 units for bs 300-350, 18 units for bs over 350 5 pen 3    sodium hypochlorite (DAKINS) 0.125 % SOLN external solution Apply topically daily 473 mL 0    meloxicam (MOBIC) 15 MG tablet Take 1 tablet by mouth once daily 90 tablet 3    DULoxetine (CYMBALTA) 30 MG extended release capsule Take 1 capsule by mouth once daily 90 capsule 3    levothyroxine (SYNTHROID) 200 MCG tablet Take 1 tablet by mouth daily 90 tablet 3    simvastatin (ZOCOR) 80 MG tablet TAKE ONE TABLET BY MOUTH ONCE DAILY 90 tablet 3    NOVOLIN R 100 UNIT/ML injection Sliding scale,  Use as directed - up to 150 units daily 9 vial 3    glucose blood VI test strips (FREESTYLE LITE) strip 1 each by In Vitro route 3 times daily As needed. 300 each 3     Current Facility-Administered Medications   Medication Dose Route Frequency Provider Last Rate Last Admin    lidocaine (XYLOCAINE) 2 % jelly   Topical PRN STANFORD Timmons - CNP         Allergies: Compazine [prochlorperazine maleate]  Past Medical History:   Diagnosis Date    Acquired hypothyroidism     Diabetes ([facility]     Generalized osteoarthritis 3/21/2018    Hypothyroidism     Morbid obesity due to excess calories ([facility] 6/24/2018    Movement disorder     joint pain    Type 2 diabetes mellitus without complication, with long-term current use of insulin (HCC)        Past Surgical History:   Procedure Laterality Date    CHOLECYSTECTOMY      TUBAL LIGATION       Family History   Problem Relation Age of Onset    Diabetes Mother     Bipolar Disorder Mother      Social History     Tobacco Use    Smoking status: Never Smoker    Smokeless tobacco: Never Used   Substance Use Topics    Alcohol use: No         Review of Systems    Review of Systems   Skin: Positive for wound. All other systems reviewed and are negative. All other review of systems are negative. Physical Exam    BP (!) 143/76   Pulse 98   Temp 97.2 °F (36.2 °C) (Temporal)   Resp 20   Ht 5' 7\" (1.702 m)   Wt 299 lb (135.6 kg)   BMI 46.83 kg/m²     Physical Exam  Vitals signs reviewed. Exam conducted with a chaperone present. Constitutional:       Appearance: Normal appearance. She is obese. HENT:      Head: Normocephalic and atraumatic.       Right Ear: External ear normal.      Left Ear: External ear normal.   Eyes:      General: Lids are normal. Lids are everted, no foreign bodies appreciated. Vision grossly intact. Gaze aligned appropriately. Cardiovascular:      Rate and Rhythm: Normal rate and regular rhythm. Pulses: Normal pulses. Heart sounds: Normal heart sounds. Pulmonary:      Effort: Pulmonary effort is normal.      Breath sounds: Normal breath sounds. Abdominal:      General: Bowel sounds are normal.   Musculoskeletal: Normal range of motion. Skin:     General: Skin is warm and dry. Findings: Wound present. Neurological:      Mental Status: She is alert and oriented to person, place, and time. Psychiatric:         Mood and Affect: Mood normal.         Behavior: Behavior normal.         Thought Content: Thought content normal.         Judgment: Judgment normal.             Post Debridement Measurements and Assessment:    The patientspain isPain Level: 0  . Wound is has improved. Please refer to nursing measurements and assessment regarding wound pre and postdebridement. Wound 01/04/21 Groin Right wound 1- right groin abscess (Active)   Wound Image   01/04/21 1323   Wound Etiology Surgical 01/04/21 1323   Dressing Status New dressing applied;New drainage noted; Old drainage noted 12/21/20 0904   Wound Cleansed Soap and water 01/04/21 1323   Dressing/Treatment Packing;ABD 01/04/21 1323   Dressing Change Due 12/21/20 12/21/20 0904   Wound Length (cm) 1.5 cm 01/04/21 1323   Wound Width (cm) 2.6 cm 01/04/21 1323   Wound Depth (cm) 1 cm 01/04/21 1323   Wound Surface Area (cm^2) 3.9 cm^2 01/04/21 1323   Change in Wound Size % (l*w) 13.33 01/04/21 1323   Wound Volume (cm^3) 3.9 cm^3 01/04/21 1323   Wound Healing % 46 01/04/21 1323   Undermining Starts ___ O'Clock 9 12/21/20 0904   Undermining Ends___ O'Clock 3 12/21/20 0904   Undermining Maxium Distance (cm) 2 12/21/20 0904   Wound Assessment Pink/red;Slough 01/04/21 1323   Drainage Amount Moderate 01/04/21 1323   Drainage Description Serosanguinous 01/04/21 1323   Odor Mild 01/04/21 1323   Chantal-wound Assessment Intact 01/04/21 1323   Margins Defined edges 01/04/21 1323   Wound Thickness Description not for Pressure Injury Full thickness 01/04/21 1323   Number of days: 0            Debridement: Selective Debridement/Non-Excisional Debridement    Using curette, scissors and forceps the wound(s)/ulcer(s) was/were sharply debrided down through and including the removal of epidermis and dermis. Devitalized Tissue Debrided:  fibrin, biofilm, slough and exudate    Pre Debridement Measurements:  Are located in the Wound/Ulcer Documentation Flow Sheet    Wound/Ulcer #: 1    Post Debridement Measurements:  Wound/Ulcer Descriptions are Pre Debridement except measurements:          Percent of Wound(s)/Ulcer(s) Debrided: 100%    Total Surface Area Debrided:  3.9 sq cm     Diabetic/Pressure/Non Pressure Ulcers only:  Ulcer: N/A     Estimated Blood Loss:  Minimal    Hemostasis Achieved:  by pressure    Procedural Pain:  0  / 10     Post Procedural Pain:  0 / 10     Response to treatment:  Well tolerated by patient. Assessment    1. Abscess of right groin    2. Right groin ulcer, with fat layer exposed ([address]    3. Noncompliance    4. Morbid obesity due to excess calories ([address]    5. Type 2 diabetes mellitus without complication, with long-term current use of insulin ([facility])          Plan  Follow up in 1 week    Planfor wound - Dress per physician order  Treatment:     Compression : No   Offloading : No    Dressing Orders:  Right groin wound: Soap and water wash, tuck 1/4 Dakins moistened gauze into the wound bed, secure with dry gauze and hy-tape twice daily    Treatment Orders:  Protein rich, multivitamin, keep glucose under 150    Discussed importance of nutrition, glucose control, compliance, wound care, and plan of care. Patient understanding and questions answered. I spent a total of  60 minutes face to face with the patient.  Over 75% of that time was spent on counseling and

## 2021-01-05 ENCOUNTER — TELEPHONE (OUTPATIENT)
Dept: INTERNAL MEDICINE | Age: 61
End: 2021-01-05

## 2021-01-05 ENCOUNTER — TELEPHONE (OUTPATIENT)
Dept: NEUROSURGERY | Age: 61
End: 2021-01-05

## 2021-01-05 DIAGNOSIS — E11.9 TYPE 2 DIABETES MELLITUS WITHOUT COMPLICATION, WITH LONG-TERM CURRENT USE OF INSULIN (HCC): ICD-10-CM

## 2021-01-05 DIAGNOSIS — Z79.4 TYPE 2 DIABETES MELLITUS WITHOUT COMPLICATION, WITH LONG-TERM CURRENT USE OF INSULIN (HCC): ICD-10-CM

## 2021-01-05 RX ORDER — INSULIN DEGLUDEC 200 U/ML
100 INJECTION, SOLUTION SUBCUTANEOUS NIGHTLY
Qty: 15 PEN | Refills: 3
Start: 2021-01-05 | End: 2021-01-08 | Stop reason: ALTCHOICE

## 2021-01-05 RX ORDER — DULAGLUTIDE 0.75 MG/.5ML
0.75 INJECTION, SOLUTION SUBCUTANEOUS WEEKLY
Qty: 15 PEN | Refills: 3
Start: 2021-01-05 | End: 2021-01-11 | Stop reason: SDUPTHER

## 2021-01-05 NOTE — TELEPHONE ENCOUNTER
I called yesterday and I got the patient I put in her referral to neurology we had talked about this at the appointment I asked her about letting us go ahead and do that and I am going to see if she would let us see psychiatry if you could call her daughter and let her know we referred her to neurology for cognitive testing and also psychiatry to see if we could help with her depression and cognition QUICK REFERENCE INFORMATION:  The ABCs of the Annual Wellness Visit    Subsequent Medicare Wellness Visit    HEALTH RISK ASSESSMENT    1946    Recent Hospitalizations:  No hospitalization(s) within the last year..        Current Medical Providers:  Patient Care Team:  Klarissa Thomas MD as PCP - General (Infectious Diseases)  Carrie Lucas MD PhD as Consulting Physician (Hematology and Oncology)  Travis Mckenzie MD as Referring Physician (Internal Medicine)  Baldo Conway MD as Consulting Physician (Endocrinology)  Robyn Butler MD as Consulting Physician (Gastroenterology)        Smoking Status:  History   Smoking Status   • Former Smoker   • Packs/day: 2.00   • Years: 30.00   • Types: Cigarettes   • Quit date: 1/1/2006   Smokeless Tobacco   • Never Used       Alcohol Consumption:  History   Alcohol Use No       Depression Screen:   PHQ-2/PHQ-9 Depression Screening 12/5/2017   Little interest or pleasure in doing things 0   Feeling down, depressed, or hopeless 0   Trouble falling or staying asleep, or sleeping too much 0   Feeling tired or having little energy 0   Poor appetite or overeating 0   Feeling bad about yourself - or that you are a failure or have let yourself or your family down 0   Trouble concentrating on things, such as reading the newspaper or watching television 0   Moving or speaking so slowly that other people could have noticed. Or the opposite - being so fidgety or restless that you have been moving around a lot more than usual 0   Thoughts that you would be better off dead, or of hurting yourself in some way 0   Total Score 0   If you checked off any problems, how difficult have these problems made it for you to do your work, take care of things at home, or get along with other people? Not difficult at all       Health Habits and Functional and Cognitive Screening:  Functional & Cognitive Status 12/5/2017   Do you have difficulty preparing food and eating? No   Do you have  difficulty bathing yourself, getting dressed or grooming yourself? No   Do you have difficulty using the toilet? No   Do you have difficulty moving around from place to place? No   Do you have trouble with steps or getting out of a bed or a chair? No   In the past year have you fallen or experienced a near fall? No   Current Diet Well Balanced Diet   Dental Exam Up to date   Eye Exam Up to date   Exercise (times per week) 0 times per week   Current Exercise Activities Include None   Do you need help using the phone?  No   Are you deaf or do you have serious difficulty hearing?  No   Do you need help with transportation? No   Do you need help shopping? No   Do you need help preparing meals?  No   Do you need help with housework?  No   Do you need help with laundry? No   Do you need help taking your medications? No   Do you need help managing money? No   Have you felt unusual stress, anger or loneliness in the last month? No   Who do you live with? Alone   If you need help, do you have trouble finding someone available to you? No   Have you been bothered in the last four weeks by sexual problems? No   Do you have difficulty concentrating, remembering or making decisions? No           Does the patient have evidence of cognitive impairment? No    Aspirin use counseling: Contraindicated from taking ASA      Recent Lab Results:  CMP:  Lab Results   Component Value Date     (H) 11/08/2017    BUN 11 11/08/2017    CREATININE 1.08 11/08/2017    EGFRIFNONA 69 11/08/2017    EGFRIFAFRI 79 11/08/2017    BCR 10 11/08/2017     11/08/2017    K 4.5 11/08/2017    CO2 22 11/08/2017    CALCIUM 9.3 11/08/2017    PROTENTOTREF 7.8 11/08/2017    ALBUMIN 4.0 11/08/2017    LABGLOBREF 3.8 11/08/2017    LABIL2 1.1 (L) 11/08/2017    BILITOT 1.8 (H) 11/08/2017    ALKPHOS 163 (H) 11/08/2017    AST 92 (H) 11/08/2017    ALT 46 (H) 11/08/2017     Lipid Panel:  Lab Results   Component Value Date    CHOL 189 04/26/2017    TRIG 154 (H)  08/02/2017    HDL 46 08/02/2017    VLDL 31 08/02/2017    LDLCALC 118 (H) 04/26/2017    LDLDIRECT 133 (H) 01/03/2014    LDLHDL 2.46 04/26/2017     HbA1c:  Lab Results   Component Value Date    HGBA1C 7.40 (H) 11/08/2017       Visual Acuity:  No exam data present    Age-appropriate Screening Schedule:  Refer to the list below for future screening recommendations based on patient's age, sex and/or medical conditions. Orders for these recommended tests are listed in the plan section. The patient has been provided with a written plan.    Health Maintenance   Topic Date Due   • TDAP/TD VACCINES (1 - Tdap) 01/15/2018 (Originally 10/27/1965)   • COLONOSCOPY  01/16/2018 (Originally 4/19/2017)   • INFLUENZA VACCINE  01/17/2018 (Originally 8/1/2017)   • PNEUMOCOCCAL VACCINE (65+ HIGH RISK) (1 of 2 - PCV13) 01/18/2018 (Originally 10/27/2011)   • DIABETIC FOOT EXAM  04/13/2018 (Originally 5/3/2017)   • DIABETIC EYE EXAM  07/05/2018 (Originally 5/3/2017)   • HEMOGLOBIN A1C  05/08/2018   • LIPID PANEL  08/02/2018   • URINE MICROALBUMIN  11/08/2018   • ZOSTER VACCINE  Excluded        Subjective   History of Present Illness    Kye Aranda is a 71 y.o. male who presents for an Subsequent Wellness Visit.    The following portions of the patient's history were reviewed and updated as appropriate: allergies, current medications, past family history, past medical history, past social history, past surgical history and problem list.    Outpatient Medications Prior to Visit   Medication Sig Dispense Refill   • BD PEN NEEDLE ONEAL U/F 32G X 4 MM misc      • clonazePAM (KlonoPIN) 0.5 MG tablet Take 1 tablet by mouth daily.     • fluconazole (DIFLUCAN) 100 MG tablet      • furosemide (LASIX) 20 MG tablet Take 1 tablet by mouth Daily. 30 tablet 3   • Icosapent Ethyl (VASCEPA) 1 G capsule Take 1 g by mouth.     • ISENTRESS 400 MG tablet Take 400 mg by mouth.     • levothyroxine (SYNTHROID, LEVOTHROID) 125 MCG tablet Take 125 mcg by mouth 2 (Two)  Times a Day.     • PREZISTA 800 MG tablet tablet Take 800 mg by mouth.     • PROCTOZONE-HC 2.5 % rectal cream APPLY TO AFFECTED AREA THREE TIMES DAILY AFTER BOWEL MOVEMENT 30 g 0   • rifaximin (XIFAXAN) 550 MG tablet Take 1 tablet by mouth every 12 (twelve) hours. 60 tablet 5   • sulfamethoxazole-trimethoprim (BACTRIM DS,SEPTRA DS) 800-160 MG per tablet Every Other Day As Needed.     • SYNTHROID 300 MCG tablet TK 1 T PO QD  0   • tamsulosin (FLOMAX) 0.4 MG capsule Take 1 capsule by mouth nightly.     • TOPROL  MG 24 hr tablet TAKE ONE TABLET BY MOUTH EVERY DAY 90 tablet 3   • TRESIBA FLEXTOUCH 200 UNIT/ML solution pen-injector INJECT 40-80 UNITS UNDER THE SKIN QD  3   • TRUVADA 200-300 MG per tablet Take 200-300 mg by mouth.     • TYBOST 150 MG tablet Take 150 mg by mouth.     • vitamin D (ERGOCALCIFEROL) 64027 UNITS capsule capsule TK 1 C PO ONCE A WEEK  0     No facility-administered medications prior to visit.        Patient Active Problem List   Diagnosis   • Chronic coronary artery disease   • Difficulty breathing   • Intermittent claudication   • Peripheral arterial occlusive disease   • Shortness of breath   • Iron deficiency anemia due to chronic blood loss   • Thrombocytopenia associated with AIDS   • Leukocytopenia   • Neutropenia   • Pancytopenia   • Iron and its compounds causing adverse effect in therapeutic use   • Liver cirrhosis   • Lumbar degenerative disc disease       Advance Care Planning:  has NO advance directive - information provided to the patient today    Identification of Risk Factors:  Risk factors include: weight  and cirrhosis, along with CAD.    Review of Systems   Constitutional: Negative for fever.   Cardiovascular: Negative for chest pain.   Gastrointestinal: Negative for abdominal pain.   Genitourinary: Negative for dysuria.   Musculoskeletal: Positive for back pain.   Neurological: Positive for weakness and numbness. Negative for headaches.       Compared to one year ago,  "the patient feels his physical health is worse.  Compared to one year ago, the patient feels his mental health is the same.    Objective     Physical Exam    Vitals:    12/05/17 1427   BP: 125/48   Pulse: (!) 49   Resp: 14   Temp: 97.7 °F (36.5 °C)   TempSrc: Oral   Weight: 83.5 kg (184 lb)   Height: 175.3 cm (69\")   PainSc: 2  Comment: LOW BACK PAIN       Body mass index is 27.17 kg/(m^2).  Discussed the patient's BMI with him. BMI is above normal parameters. Follow-up plan includes: none.    Assessment/Plan   Patient Self-Management and Personalized Health Advice  The patient has been provided with information about: diet, exercise and weight management and preventive services including:   · Exercise counseling provided, Nutrition counseling provided.    Visit Diagnoses:    ICD-10-CM ICD-9-CM   1. Medicare annual wellness visit, subsequent Z00.00 V70.0   2. Lumbar degenerative disc disease M51.36 722.52       Orders Placed This Encounter   Procedures   • Ambulatory Referral to Pain Management     Referral Priority:   Routine     Referral Type:   Pain Management     Referral Reason:   Specialty Services Required     Requested Specialty:   Pain Medicine     Number of Visits Requested:   1       Outpatient Encounter Prescriptions as of 12/5/2017   Medication Sig Dispense Refill   • BD PEN NEEDLE ONEAL U/F 32G X 4 MM misc      • clonazePAM (KlonoPIN) 0.5 MG tablet Take 1 tablet by mouth daily.     • fluconazole (DIFLUCAN) 100 MG tablet      • furosemide (LASIX) 20 MG tablet Take 1 tablet by mouth Daily. 30 tablet 3   • Icosapent Ethyl (VASCEPA) 1 G capsule Take 1 g by mouth.     • ISENTRESS 400 MG tablet Take 400 mg by mouth.     • levothyroxine (SYNTHROID, LEVOTHROID) 125 MCG tablet Take 125 mcg by mouth 2 (Two) Times a Day.     • PREZISTA 800 MG tablet tablet Take 800 mg by mouth.     • PROCTOZONE-HC 2.5 % rectal cream APPLY TO AFFECTED AREA THREE TIMES DAILY AFTER BOWEL MOVEMENT 30 g 0   • rifaximin (XIFAXAN) 550 MG " tablet Take 1 tablet by mouth every 12 (twelve) hours. 60 tablet 5   • sulfamethoxazole-trimethoprim (BACTRIM DS,SEPTRA DS) 800-160 MG per tablet Every Other Day As Needed.     • SYNTHROID 300 MCG tablet TK 1 T PO QD  0   • tamsulosin (FLOMAX) 0.4 MG capsule Take 1 capsule by mouth nightly.     • TOPROL  MG 24 hr tablet TAKE ONE TABLET BY MOUTH EVERY DAY 90 tablet 3   • TRESIBA FLEXTOUCH 200 UNIT/ML solution pen-injector INJECT 40-80 UNITS UNDER THE SKIN QD  3   • TRUVADA 200-300 MG per tablet Take 200-300 mg by mouth.     • TYBOST 150 MG tablet Take 150 mg by mouth.     • vitamin D (ERGOCALCIFEROL) 62412 UNITS capsule capsule TK 1 C PO ONCE A WEEK  0     No facility-administered encounter medications on file as of 12/5/2017.        Reviewed use of high risk medication in the elderly: yes  Reviewed for potential of harmful drug interactions in the elderly: not applicable    Follow Up:  Return if symptoms worsen or fail to improve.     An After Visit Summary and PPPS with all of these plans were given to the patient.

## 2021-01-05 NOTE — TELEPHONE ENCOUNTER
Celina at [facility] is calling about meds. They are going to get her the Novolog flexpen and novolin R. Are we going to have her go back to Revalesio or Trulicity? Also are we going to have her on any BP Meds?     [address] [phone]

## 2021-01-07 ENCOUNTER — TELEPHONE (OUTPATIENT)
Dept: INTERNAL MEDICINE | Age: 61
End: 2021-01-07

## 2021-01-07 NOTE — TELEPHONE ENCOUNTER
Can they get drug assist for lantus or basaglar or toujeo --- the prices they are listing arent affordable--- she has good insurance it appears can they help her know how to at least apply for better drug coverage in the future  Have them check if any of the ones I listed are available with drug assist and let me know

## 2021-01-07 NOTE — TELEPHONE ENCOUNTER
Yes, this is for drug assistance through [facility]. She was calling to let you know that they werent able to get her help for the Olayinka Maldonado and wanted to know if you wanted to change it?

## 2021-01-07 NOTE — TELEPHONE ENCOUNTER
We are trying to get through drug assistance- ask [facility] where we stand with this or have Delphine Tracy help ask about this

## 2021-01-07 NOTE — TELEPHONE ENCOUNTER
They were able to get her the Trulicity, but the Maxi Dubin is going to cost her $630. Other insulin prices are:  Levemir - $560  Lantus - $358  Apidra - $650    She wasn't sure what you wanted to give her.

## 2021-01-08 ENCOUNTER — TELEPHONE (OUTPATIENT)
Dept: NEUROSURGERY | Age: 61
End: 2021-01-08

## 2021-01-08 RX ORDER — INSULIN GLARGINE 100 [IU]/ML
70 INJECTION, SOLUTION SUBCUTANEOUS NIGHTLY
COMMUNITY
End: 2021-01-08 | Stop reason: ALTCHOICE

## 2021-01-08 RX ORDER — INSULIN GLARGINE 100 [IU]/ML
75 INJECTION, SOLUTION SUBCUTANEOUS NIGHTLY
Qty: 5 PEN | Refills: 3 | Status: SHIPPED | OUTPATIENT
Start: 2021-01-08 | End: 2021-01-11 | Stop reason: SDUPTHER

## 2021-01-08 NOTE — TELEPHONE ENCOUNTER
2nd attempt to call patient to schedule appointment, message states not available unable to leave voicemail.

## 2021-01-10 PROBLEM — F33.9 EPISODE OF RECURRENT MAJOR DEPRESSIVE DISORDER (HCC): Status: ACTIVE | Noted: 2021-01-10

## 2021-01-10 PROBLEM — F09 COGNITIVE DYSFUNCTION: Status: ACTIVE | Noted: 2021-01-10

## 2021-01-10 PROBLEM — E11.65 UNCONTROLLED TYPE 2 DIABETES MELLITUS WITH HYPERGLYCEMIA (HCC): Status: ACTIVE | Noted: 2021-01-10

## 2021-01-10 ASSESSMENT — ENCOUNTER SYMPTOMS
EYE REDNESS: 0
BACK PAIN: 0
COUGH: 0
SHORTNESS OF BREATH: 0
ABDOMINAL PAIN: 0
EYE DISCHARGE: 0
SINUS PRESSURE: 0
ABDOMINAL DISTENTION: 0

## 2021-01-11 ENCOUNTER — TELEPHONE (OUTPATIENT)
Dept: NEUROSURGERY | Age: 61
End: 2021-01-11

## 2021-01-11 DIAGNOSIS — Z79.4 TYPE 2 DIABETES MELLITUS WITHOUT COMPLICATION, WITH LONG-TERM CURRENT USE OF INSULIN (HCC): ICD-10-CM

## 2021-01-11 DIAGNOSIS — E11.9 TYPE 2 DIABETES MELLITUS WITHOUT COMPLICATION, WITH LONG-TERM CURRENT USE OF INSULIN (HCC): ICD-10-CM

## 2021-01-11 RX ORDER — INSULIN ASPART 100 [IU]/ML
INJECTION, SOLUTION INTRAVENOUS; SUBCUTANEOUS
Qty: 5 PEN | Refills: 3 | Status: SHIPPED | OUTPATIENT
Start: 2021-01-11 | End: 2021-02-15 | Stop reason: SDUPTHER

## 2021-01-11 RX ORDER — INSULIN GLARGINE 100 [IU]/ML
75 INJECTION, SOLUTION SUBCUTANEOUS NIGHTLY
Qty: 5 PEN | Refills: 3 | Status: SHIPPED | OUTPATIENT
Start: 2021-01-11 | End: 2021-03-11

## 2021-01-11 RX ORDER — DULAGLUTIDE 0.75 MG/.5ML
0.75 INJECTION, SOLUTION SUBCUTANEOUS WEEKLY
Qty: 15 PEN | Refills: 3 | Status: SHIPPED | OUTPATIENT
Start: 2021-01-11 | End: 2021-10-21 | Stop reason: ALTCHOICE

## 2021-01-14 ENCOUNTER — HOSPITAL ENCOUNTER (OUTPATIENT)
Dept: WOUND CARE | Age: 61
Discharge: HOME | End: 2021-01-14
Payer: MEDICARE

## 2021-01-14 VITALS
HEIGHT: 67 IN | BODY MASS INDEX: 45.99 KG/M2 | SYSTOLIC BLOOD PRESSURE: 127 MMHG | TEMPERATURE: 97 F | HEART RATE: 98 BPM | RESPIRATION RATE: 16 BRPM | DIASTOLIC BLOOD PRESSURE: 78 MMHG | WEIGHT: 293 LBS

## 2021-01-14 DIAGNOSIS — L98.492 RIGHT GROIN ULCER, WITH FAT LAYER EXPOSED (HCC): ICD-10-CM

## 2021-01-14 DIAGNOSIS — Z79.4 TYPE 2 DIABETES MELLITUS WITHOUT COMPLICATION, WITH LONG-TERM CURRENT USE OF INSULIN (HCC): Primary | Chronic | ICD-10-CM

## 2021-01-14 DIAGNOSIS — E11.9 TYPE 2 DIABETES MELLITUS WITHOUT COMPLICATION, WITH LONG-TERM CURRENT USE OF INSULIN (HCC): Primary | Chronic | ICD-10-CM

## 2021-01-14 PROCEDURE — 97597 DBRDMT OPN WND 1ST 20 CM/<: CPT | Performed by: SURGERY

## 2021-01-14 PROCEDURE — 97597 DBRDMT OPN WND 1ST 20 CM/<: CPT

## 2021-01-14 NOTE — PROGRESS NOTES
[address]   Progress Note and Procedure Note      [id number] [facility] RECORD NUMBER:  [id number]  AGE: 61 y.o. GENDER: female  : 1960  EPISODE DATE:  2021    Subjective:     Chief Complaint   Patient presents with    Wound Check     right groin, almost healed per patient         HISTORY of PRESENT ILLNESS TACHO Berry is a 61 y.o. female who presents today for wound/ulcer evaluation.    Wound Context: Pt with R groin wound here for eval/treat  Wound/Ulcer Pain Timing/Severity: none  Quality of pain: N/A  Severity:  0 / 10   Modifying Factors: None  Associated Signs/Symptoms: none    Ulcer Identification:  Ulcer Type: non-healing surgical  Contributing Factors: edema    Wound: surgical        PAST MEDICAL HISTORY        Diagnosis Date    Acquired hypothyroidism     Diabetes ([facility]     Episode of recurrent major depressive disorder ([facility] 1/10/2021    Generalized osteoarthritis 3/21/2018    Hypothyroidism     Morbid obesity due to excess calories ([facility] 2018    Movement disorder     joint pain    Type 2 diabetes mellitus without complication, with long-term current use of insulin ([facility])        PAST SURGICAL HISTORY    Past Surgical History:   Procedure Laterality Date    CHOLECYSTECTOMY      TUBAL LIGATION         FAMILY HISTORY    Family History   Problem Relation Age of Onset    Diabetes Mother     Bipolar Disorder Mother        SOCIAL HISTORY    Social History     Tobacco Use    Smoking status: Never Smoker    Smokeless tobacco: Never Used   Substance Use Topics    Alcohol use: No    Drug use: No       ALLERGIES    Allergies   Allergen Reactions    Compazine [Prochlorperazine Maleate] Anaphylaxis       MEDICATIONS    Current Outpatient Medications on File Prior to Encounter   Medication Sig Dispense Refill    Dulaglutide (TRULICITY) 6.59 AL/8.9ZC SOPN Inject 0.75 mg into the skin once a week 15 pen 3    insulin aspart (NOVOLOG FLEXPEN) 100 UNIT/ML injection pen Take 6 units for bs 250-300, 12 units for bs 300-350, 18 units for bs over 350 5 pen 3    insulin glargine (BASAGLAR KWIKPEN) 100 UNIT/ML injection pen Inject 75 Units into the skin nightly 5 pen 3    zolpidem (AMBIEN) 10 MG tablet TAKE 1 TABLET BY MOUTH EVERY NIGHT AS NEEDED FOR SLEEP 30 tablet 1    sodium hypochlorite (DAKINS) 0.125 % SOLN external solution Apply topically daily 473 mL 0    meloxicam (MOBIC) 15 MG tablet Take 1 tablet by mouth once daily 90 tablet 3    DULoxetine (CYMBALTA) 30 MG extended release capsule Take 1 capsule by mouth once daily 90 capsule 3    levothyroxine (SYNTHROID) 200 MCG tablet Take 1 tablet by mouth daily 90 tablet 3    simvastatin (ZOCOR) 80 MG tablet TAKE ONE TABLET BY MOUTH ONCE DAILY 90 tablet 3    glucose blood VI test strips (FREESTYLE LITE) strip 1 each by In Vitro route 3 times daily As needed. 300 each 3    [DISCONTINUED] metFORMIN (GLUCOPHAGE) 1000 MG tablet TAKE ONE TABLET BY MOUTH TWICE DAILY WITH FOOD 180 tablet 3    [DISCONTINUED] NOVOLIN R 100 UNIT/ML injection Sliding scale,  Use as directed - up to 150 units daily 9 vial 3     No current facility-administered medications on file prior to encounter. REVIEW OF SYSTEMS    A comprehensive review of systems was negative.     Objective:      /78   Pulse 98   Temp 97 °F (36.1 °C) (Temporal)   Resp 16   Ht 5' 7\" (1.702 m)   Wt 299 lb (135.6 kg)   BMI 46.83 kg/m²     Wt Readings from Last 3 Encounters:   01/14/21 299 lb (135.6 kg)   01/04/21 299 lb (135.6 kg)   12/29/20 299 lb (135.6 kg)       PHYSICAL EXAM    General Appearance: alert and oriented to person, place and time, well developed and well- nourished, in no acute distress  Skin: warm and dry, no rash or erythema  Head: normocephalic and atraumatic  Eyes: pupils equal, round, and reactive to light, extraocular eye movements intact, conjunctivae normal  ENT: tympanic membrane, external ear and ear canal normal bilaterally, nose without deformity, nasal mucosa and turbinates normal without polyps, lips teeth and gums normal  Neck: supple and non-tender without mass, no thyromegaly or thyroid nodules, no cervical lymphadenopathy  Pulmonary/Chest: clear to auscultation bilaterally- no wheezes, rales or rhonchi, normal air movement, no respiratory distress  Cardiovascular: normal rate, regular rhythm, normal S1 and S2, no murmurs, rubs, clicks, or gallops, distal pulses intact, no carotid bruits  Abdomen: soft, non-tender, non-distended, normal bowel sounds, no masses or organomegaly  Extremities: no cyanosis, clubbing or edema  Musculoskeletal: normal range of motion, no joint swelling, deformity or tenderness  Neurologic: reflexes normal and symmetric, no cranial nerve deficit, gait, coordination and speech normal, skin sensation normal      Assessment:      Problem List Items Addressed This Visit     Type 2 diabetes mellitus without complication, with long-term current use of insulin (HCC) - Primary (Chronic)    * (Principal) Right groin ulcer, with fat layer exposed ([address] (Chronic)           Procedure Note  Indications:  Based on my examination of this patient's wound(s)/ulcer(s) today, debridement is required to promote healing and evaluate the wound base. Performed by: Loran Jung MD    Consent obtained:  Yes    Time out taken:  Yes    Pain Control:         Debridement:Non-excisional Debridement    Using curette the wound(s)/ulcer(s) was/were sharply debrided down through and including the removal of epidermis and dermis.         Devitalized Tissue Debrided:  fibrin, biofilm, slough, necrotic/eschar and exudate      Pre Debridement Measurements:  Are located in the Wound/Ulcer Documentation Flow Sheet    Wound/Ulcer #: 1    Percent of Wound(s)/Ulcer(s) Debrided: 100%    Total Surface Area Debrided:  2 sq cm       Diabetic/Pressure/Non Pressure Ulcers only:  Ulcer: Diabetic ulcer, fat layer exposed           Post Debridement Measurements:    Wound/Ulcer Descriptions are Pre Debridement --EXCEPT MEASUREMENTS    Wound 01/04/21 Groin Right wound 1- right groin abscess (Active)   Wound Image   01/14/21 1334   Wound Etiology Surgical 01/14/21 1334   Dressing Status New dressing applied;New drainage noted; Old drainage noted 12/21/20 0904   Wound Cleansed Soap and water 01/14/21 1334   Dressing/Treatment Packing;ABD 01/04/21 1323   Dressing Change Due 12/21/20 12/21/20 0904   Wound Length (cm) 1 cm 01/14/21 1334   Wound Width (cm) 2 cm 01/14/21 1334   Wound Depth (cm) 0.1 cm 01/14/21 1334   Wound Surface Area (cm^2) 2 cm^2 01/14/21 1334   Change in Wound Size % (l*w) 55.56 01/14/21 1334   Wound Volume (cm^3) 0.2 cm^3 01/14/21 1334   Wound Healing % 97 01/14/21 1334   Post-Procedure Length (cm) 1 cm 01/14/21 1342   Post-Procedure Width (cm) 2 cm 01/14/21 1342   Post-Procedure Depth (cm) 0.1 cm 01/14/21 1342   Post-Procedure Surface Area (cm^2) 2 cm^2 01/14/21 1342   Post-Procedure Volume (cm^3) 0.2 cm^3 01/14/21 1342   Distance Tunneling (cm) 0 cm 01/14/21 1334   Tunneling Position ___ O'Clock 0 01/14/21 1334   Undermining Starts ___ O'Clock 0 01/14/21 1334   Undermining Ends___ O'Clock 0 01/14/21 1334   Undermining Maxium Distance (cm) 0 01/14/21 1334   Wound Assessment Pink/red 01/14/21 1334   Drainage Amount Moderate 01/14/21 1334   Drainage Description Serosanguinous 01/14/21 1334   Odor None 01/14/21 1334   Chantal-wound Assessment Intact 01/14/21 1334   Margins Defined edges 01/14/21 1334   Wound Thickness Description not for Pressure Injury Full thickness 01/14/21 1334   Number of days: 10             Estimated Blood Loss:  Minimal    Hemostasis Achieved:  by pressure    Procedural Pain:  0  / 10     Post Procedural Pain:  0 / 10     Response to treatment:  Well tolerated by patient.          Plan:     Problem List Items Addressed This Visit     Type 2 diabetes mellitus without complication, with long-term current use of insulin ([facility] - Primary (Chronic)    * (Principal) Right groin ulcer, with fat layer exposed ([address] (Chronic)          Cont current care. Wound gel daily. DC dakins    Treatment Note please see attached Discharge Instructions    In my professional opinion this patient would benefit from HBO Therapy: No    Written patient dismissal instructions given to patient and signed by patient or POA. Discharge [address] and Hyperbaric Oxygen Therapy   Physician Orders and Discharge Instructions  [address] [facility]  Amol Johnson  Telephone: [phone]    NAME:  Vincent Copa:  1960  MEDICAL RECORD NUMBER:  [id number]  DATE:  1/14/2021    Discharge condition: Stable    Discharge to: Home    Left via:Private automobile    Accompanied by: Self    [facility]F/H[facility]: [facility]    Dressing Orders:  Right groin wound: Soap and water wash, Thin layer of Instrasite gel,secure with dry gauze  and hy-tape 1-2 times daily    Treatment Orders:  Protein rich, multivitamin, keep glucose under 150    [facility] follow up visit ____________1 week_________________  (Please note your next appointment above and if you are unable to keep, kindly give a 24 hour notice. Thank you.)    If you experience any of the following, please call the [address] [place] [address] during business hours:    * Increase in Pain  * Temperature over 101  * Increase in drainage from your wound  * Drainage with a foul odor  * Bleeding  * Increase in swelling  * Need for compression bandage changes due to slippage, breakthrough drainage. If you need medical attention outside of the business hours of the [address] [place] [address] Road please contact your PCP or go to the nearest emergency room.         Electronically signed by Toma Espinoza MD on 1/14/2021 at 1:57 PM

## 2021-01-22 ENCOUNTER — HOSPITAL ENCOUNTER (OUTPATIENT)
Dept: WOUND CARE | Age: 61
Discharge: HOME | End: 2021-01-22
Payer: MEDICARE

## 2021-01-22 VITALS
WEIGHT: 293 LBS | RESPIRATION RATE: 20 BRPM | SYSTOLIC BLOOD PRESSURE: 121 MMHG | DIASTOLIC BLOOD PRESSURE: 74 MMHG | TEMPERATURE: 95.6 F | HEART RATE: 97 BPM | BODY MASS INDEX: 45.99 KG/M2 | HEIGHT: 67 IN

## 2021-01-22 DIAGNOSIS — Z79.4 TYPE 2 DIABETES MELLITUS WITHOUT COMPLICATION, WITH LONG-TERM CURRENT USE OF INSULIN (HCC): Primary | Chronic | ICD-10-CM

## 2021-01-22 DIAGNOSIS — E11.9 TYPE 2 DIABETES MELLITUS WITHOUT COMPLICATION, WITH LONG-TERM CURRENT USE OF INSULIN (HCC): Primary | Chronic | ICD-10-CM

## 2021-01-22 DIAGNOSIS — L98.492 RIGHT GROIN ULCER, WITH FAT LAYER EXPOSED (HCC): ICD-10-CM

## 2021-01-22 PROCEDURE — 97597 DBRDMT OPN WND 1ST 20 CM/<: CPT | Performed by: SURGERY

## 2021-01-22 PROCEDURE — 97597 DBRDMT OPN WND 1ST 20 CM/<: CPT

## 2021-01-22 ASSESSMENT — PAIN SCALES - GENERAL: PAINLEVEL_OUTOF10: 0

## 2021-01-22 NOTE — PROGRESS NOTES
[address]   Progress Note and Procedure Note      [address] [facility] RECORD NUMBER:  [id number]  AGE: 61 y.o. GENDER: female  : 1960  EPISODE DATE:  2021    Subjective:     Chief Complaint   Patient presents with    Wound Check     Pt is almost healed         HISTORY of PRESENT ILLNESS HPI     Shantell Del Toro is a 61 y.o. female who presents today for wound/ulcer evaluation.    Wound Context: Pt with R groin wound wound here for eval/treat  Wound/Ulcer Pain Timing/Severity: none  Quality of pain: N/A  Severity:  0 / 10   Modifying Factors: None  Associated Signs/Symptoms: none    Ulcer Identification:  Ulcer Type: non-healing surgical  Contributing Factors: shear force    Wound: DM        PAST MEDICAL HISTORY        Diagnosis Date    Acquired hypothyroidism     Diabetes ([facility]     Episode of recurrent major depressive disorder ([facility] 1/10/2021    Generalized osteoarthritis 3/21/2018    Hypothyroidism     Morbid obesity due to excess calories ([facility] 2018    Movement disorder     joint pain    Type 2 diabetes mellitus without complication, with long-term current use of insulin ([facility])        PAST SURGICAL HISTORY    Past Surgical History:   Procedure Laterality Date    CHOLECYSTECTOMY      TUBAL LIGATION         FAMILY HISTORY    Family History   Problem Relation Age of Onset    Diabetes Mother     Bipolar Disorder Mother        SOCIAL HISTORY    Social History     Tobacco Use    Smoking status: Never Smoker    Smokeless tobacco: Never Used   Substance Use Topics    Alcohol use: No    Drug use: No       ALLERGIES    Allergies   Allergen Reactions    Compazine [Prochlorperazine Maleate] Anaphylaxis       MEDICATIONS    Current Outpatient Medications on File Prior to Encounter   Medication Sig Dispense Refill    insulin aspart (NOVOLOG FLEXPEN) 100 UNIT/ML injection pen Take 6 units for bs 250-300, 12 units for bs 300-350, 18 units for bs over 350 5 pen 3  zolpidem (AMBIEN) 10 MG tablet TAKE 1 TABLET BY MOUTH EVERY NIGHT AS NEEDED FOR SLEEP 30 tablet 1    meloxicam (MOBIC) 15 MG tablet Take 1 tablet by mouth once daily 90 tablet 3    DULoxetine (CYMBALTA) 30 MG extended release capsule Take 1 capsule by mouth once daily 90 capsule 3    levothyroxine (SYNTHROID) 200 MCG tablet Take 1 tablet by mouth daily 90 tablet 3    simvastatin (ZOCOR) 80 MG tablet TAKE ONE TABLET BY MOUTH ONCE DAILY 90 tablet 3    Dulaglutide (TRULICITY) 6.55 SG/6.5XI SOPN Inject 0.75 mg into the skin once a week 15 pen 3    insulin glargine (BASAGLAR KWIKPEN) 100 UNIT/ML injection pen Inject 75 Units into the skin nightly 5 pen 3    sodium hypochlorite (DAKINS) 0.125 % SOLN external solution Apply topically daily 473 mL 0    [DISCONTINUED] metFORMIN (GLUCOPHAGE) 1000 MG tablet TAKE ONE TABLET BY MOUTH TWICE DAILY WITH FOOD 180 tablet 3    [DISCONTINUED] NOVOLIN R 100 UNIT/ML injection Sliding scale,  Use as directed - up to 150 units daily 9 vial 3    glucose blood VI test strips (FREESTYLE LITE) strip 1 each by In Vitro route 3 times daily As needed. 300 each 3     No current facility-administered medications on file prior to encounter. REVIEW OF SYSTEMS    A comprehensive review of systems was negative.     Objective:      /74   Pulse 97   Temp 95.6 °F (35.3 °C) (Temporal)   Resp 20   Ht 5' 7\" (1.702 m)   Wt (!) 305 lb 6.4 oz (138.5 kg)   BMI 47.83 kg/m²     Wt Readings from Last 3 Encounters:   01/22/21 (!) 305 lb 6.4 oz (138.5 kg)   01/14/21 299 lb (135.6 kg)   01/04/21 299 lb (135.6 kg)       PHYSICAL EXAM    General Appearance: alert and oriented to person, place and time, well developed and well- nourished, in no acute distress  Skin: warm and dry, no rash or erythema  Head: normocephalic and atraumatic  Eyes: pupils equal, round, and reactive to light, extraocular eye movements intact, conjunctivae normal  ENT: tympanic membrane, external ear and ear canal normal bilaterally, nose without deformity, nasal mucosa and turbinates normal without polyps, lips teeth and gums normal  Neck: supple and non-tender without mass, no thyromegaly or thyroid nodules, no cervical lymphadenopathy  Pulmonary/Chest: clear to auscultation bilaterally- no wheezes, rales or rhonchi, normal air movement, no respiratory distress  Cardiovascular: normal rate, regular rhythm, normal S1 and S2, no murmurs, rubs, clicks, or gallops, distal pulses intact, no carotid bruits  Abdomen: soft, non-tender, non-distended, normal bowel sounds, no masses or organomegaly  Extremities: no cyanosis, clubbing or edema  Musculoskeletal: normal range of motion, no joint swelling, deformity or tenderness  Neurologic: reflexes normal and symmetric, no cranial nerve deficit, gait, coordination and speech normal, skin sensation normal      Assessment:      Problem List Items Addressed This Visit     Type 2 diabetes mellitus without complication, with long-term current use of insulin (HCC) - Primary (Chronic)    * (Principal) Right groin ulcer, with fat layer exposed ([address] (Chronic)           Procedure Note  Indications:  Based on my examination of this patient's wound(s)/ulcer(s) today, debridement is required to promote healing and evaluate the wound base. Performed by: Kane Jane MD    Consent obtained:  Yes    Time out taken:  Yes    Pain Control:         Debridement:Non-excisional Debridement    Using curette the wound(s)/ulcer(s) was/were sharply debrided down through and including the removal of epidermis and dermis.         Devitalized Tissue Debrided:  fibrin, biofilm, slough, necrotic/eschar and exudate      Pre Debridement Measurements:  Are located in the Wound/Ulcer Documentation Flow Sheet    Wound/Ulcer #: 1    Percent of Wound(s)/Ulcer(s) Debrided: 100%    Total Surface Area Debrided:  1.5 sq cm       Diabetic/Pressure/Non Pressure Ulcers only:  Ulcer: Diabetic ulcer, fat layer exposed           Post Debridement Measurements:    Wound/Ulcer Descriptions are Pre Debridement --EXCEPT MEASUREMENTS    Wound 01/04/21 Groin Right wound 1- right groin abscess (Active)   Wound Image   01/22/21 1102   Wound Etiology Surgical 01/22/21 1102   Dressing Status Old drainage noted 01/22/21 1102   Wound Cleansed Soap and water 01/22/21 1102   Dressing/Treatment Packing;ABD 01/04/21 1323   Offloading for Diabetic Foot Ulcers No offloading required 01/14/21 1334   Wound Length (cm) 1 cm 01/22/21 1102   Wound Width (cm) 1.5 cm 01/22/21 1102   Wound Depth (cm) 0.1 cm 01/22/21 1102   Wound Surface Area (cm^2) 1.5 cm^2 01/22/21 1102   Change in Wound Size % (l*w) 66.67 01/22/21 1102   Wound Volume (cm^3) 0.15 cm^3 01/22/21 1102   Wound Healing % 98 01/22/21 1102   Post-Procedure Length (cm) 1 cm 01/22/21 1107   Post-Procedure Width (cm) 1.5 cm 01/22/21 1107   Post-Procedure Depth (cm) 0.1 cm 01/22/21 1107   Post-Procedure Surface Area (cm^2) 1.5 cm^2 01/22/21 1107   Post-Procedure Volume (cm^3) 0.15 cm^3 01/22/21 1107   Distance Tunneling (cm) 0 cm 01/22/21 1102   Tunneling Position ___ O'Clock 0 01/22/21 1102   Undermining Starts ___ O'Clock 0 01/22/21 1102   Undermining Ends___ O'Clock 0 01/22/21 1102   Undermining Maxium Distance (cm) 0 01/22/21 1102   Wound Assessment Pink/red;Slough 01/22/21 1102   Drainage Amount Small 01/22/21 1102   Drainage Description Serosanguinous 01/22/21 1102   Odor None 01/22/21 1102   Chantal-wound Assessment Hyperpigmented 01/22/21 1102   Margins Attached edges 01/22/21 1102   Wound Thickness Description not for Pressure Injury Full thickness 01/22/21 1102   Number of days: 17             Estimated Blood Loss:  Minimal    Hemostasis Achieved:  by pressure and by silver nitrate stick    Procedural Pain:  0  / 10     Post Procedural Pain:  0 / 10     Response to treatment:  Well tolerated by patient.          Plan:     Problem List Items Addressed This Visit     Type 2 diabetes mellitus without complication, with long-term current use of insulin (HCC) - Primary (Chronic)    * (Principal) Right groin ulcer, with fat layer exposed ([address] (Chronic)          Cont current care    Treatment Note please see attached Discharge Instructions    In my professional opinion this patient would benefit from HBO Therapy: No    Written patient dismissal instructions given to patient and signed by patient or POA. Discharge [address] and Hyperbaric Oxygen Therapy   Physician Orders and Discharge Instructions  [facility] [facility]  [place], [address]  Telephone: [phone]    NAME:  [address] [place] [address] Street:  1960  MEDICAL RECORD NUMBER:  [id number]  DATE:  1/22/2021    Discharge condition: Stable    Discharge to: Home    Left via:Private automobile    Accompanied by: TIFFANIE [facility] follow up visit _____________________________  (Please note your next appointment above and if you are unable to keep, kindly give a 24 hour notice. Thank you.)          If you experience any of the following, please call the [facility] during business hours:    * Increase in Pain  * Temperature over 101  * Increase in drainage from your wound  * Drainage with a foul odor  * Bleeding  * Increase in swelling  * Need for compression bandage changes due to slippage, breakthrough drainage. If you need medical attention outside of the business hours of the [address] please contact your PCP or go to the nearest emergency room.         Electronically signed by Sandra Francois MD on 1/22/2021 at 11:08 AM

## 2021-01-29 ENCOUNTER — TELEPHONE (OUTPATIENT)
Dept: INTERNAL MEDICINE | Age: 61
End: 2021-01-29

## 2021-01-29 ENCOUNTER — HOSPITAL ENCOUNTER (OUTPATIENT)
Dept: WOUND CARE | Age: 61
Discharge: HOME | End: 2021-01-29
Payer: MEDICARE

## 2021-01-29 VITALS
DIASTOLIC BLOOD PRESSURE: 81 MMHG | HEART RATE: 103 BPM | TEMPERATURE: 97 F | HEIGHT: 67 IN | SYSTOLIC BLOOD PRESSURE: 145 MMHG | WEIGHT: 293 LBS | BODY MASS INDEX: 45.99 KG/M2 | RESPIRATION RATE: 20 BRPM

## 2021-01-29 DIAGNOSIS — L98.492 RIGHT GROIN ULCER, WITH FAT LAYER EXPOSED (HCC): ICD-10-CM

## 2021-01-29 DIAGNOSIS — E11.9 TYPE 2 DIABETES MELLITUS WITHOUT COMPLICATION, WITH LONG-TERM CURRENT USE OF INSULIN (HCC): Primary | Chronic | ICD-10-CM

## 2021-01-29 DIAGNOSIS — Z79.4 TYPE 2 DIABETES MELLITUS WITHOUT COMPLICATION, WITH LONG-TERM CURRENT USE OF INSULIN (HCC): Primary | Chronic | ICD-10-CM

## 2021-01-29 PROCEDURE — 97597 DBRDMT OPN WND 1ST 20 CM/<: CPT | Performed by: SURGERY

## 2021-01-29 PROCEDURE — 97597 DBRDMT OPN WND 1ST 20 CM/<: CPT

## 2021-01-29 RX ORDER — LEVOTHYROXINE SODIUM 0.2 MG/1
200 TABLET ORAL DAILY
Qty: 90 TABLET | Refills: 3 | Status: SHIPPED | OUTPATIENT
Start: 2021-01-29 | End: 2021-07-19 | Stop reason: SDUPTHER

## 2021-01-29 ASSESSMENT — PAIN DESCRIPTION - ONSET: ONSET: ON-GOING

## 2021-01-29 ASSESSMENT — PAIN SCALES - GENERAL: PAINLEVEL_OUTOF10: 0

## 2021-01-29 NOTE — PROGRESS NOTES
insulin glargine (BASAGLAR KWIKPEN) 100 UNIT/ML injection pen Inject 75 Units into the skin nightly 5 pen 3    zolpidem (AMBIEN) 10 MG tablet TAKE 1 TABLET BY MOUTH EVERY NIGHT AS NEEDED FOR SLEEP 30 tablet 1    sodium hypochlorite (DAKINS) 0.125 % SOLN external solution Apply topically daily 473 mL 0    meloxicam (MOBIC) 15 MG tablet Take 1 tablet by mouth once daily 90 tablet 3    DULoxetine (CYMBALTA) 30 MG extended release capsule Take 1 capsule by mouth once daily 90 capsule 3    levothyroxine (SYNTHROID) 200 MCG tablet Take 1 tablet by mouth daily 90 tablet 3    Dulaglutide (TRULICITY) 6.56 KU/1.0PY SOPN Inject 0.75 mg into the skin once a week 15 pen 3    simvastatin (ZOCOR) 80 MG tablet TAKE ONE TABLET BY MOUTH ONCE DAILY 90 tablet 3    [DISCONTINUED] metFORMIN (GLUCOPHAGE) 1000 MG tablet TAKE ONE TABLET BY MOUTH TWICE DAILY WITH FOOD 180 tablet 3    [DISCONTINUED] NOVOLIN R 100 UNIT/ML injection Sliding scale,  Use as directed - up to 150 units daily 9 vial 3    glucose blood VI test strips (FREESTYLE LITE) strip 1 each by In Vitro route 3 times daily As needed. 300 each 3     No current facility-administered medications on file prior to encounter. REVIEW OF SYSTEMS    A comprehensive review of systems was negative.     Objective:      BP (!) 145/81   Pulse 103   Temp 97 °F (36.1 °C) (Temporal)   Resp 20   Ht 5' 7\" (1.702 m)   Wt (!) 305 lb (138.3 kg)   BMI 47.77 kg/m²     Wt Readings from Last 3 Encounters:   01/29/21 (!) 305 lb (138.3 kg)   01/22/21 (!) 305 lb 6.4 oz (138.5 kg)   01/14/21 299 lb (135.6 kg)       PHYSICAL EXAM    General Appearance: alert and oriented to person, place and time, well developed and well- nourished, in no acute distress  Skin: warm and dry, no rash or erythema  Head: normocephalic and atraumatic  Eyes: pupils equal, round, and reactive to light, extraocular eye movements intact, conjunctivae normal  ENT: tympanic membrane, external ear and ear canal normal bilaterally, nose without deformity, nasal mucosa and turbinates normal without polyps, lips teeth and gums normal  Neck: supple and non-tender without mass, no thyromegaly or thyroid nodules, no cervical lymphadenopathy  Pulmonary/Chest: clear to auscultation bilaterally- no wheezes, rales or rhonchi, normal air movement, no respiratory distress  Cardiovascular: normal rate, regular rhythm, normal S1 and S2, no murmurs, rubs, clicks, or gallops, distal pulses intact, no carotid bruits  Abdomen: soft, non-tender, non-distended, normal bowel sounds, no masses or organomegaly  Extremities: no cyanosis, clubbing or edema  Musculoskeletal: normal range of motion, no joint swelling, deformity or tenderness  Neurologic: reflexes normal and symmetric, no cranial nerve deficit, gait, coordination and speech normal, skin sensation normal      Assessment:      Problem List Items Addressed This Visit     Type 2 diabetes mellitus without complication, with long-term current use of insulin (HCC) - Primary (Chronic)    * (Principal) Right groin ulcer, with fat layer exposed ([address] (Chronic)           Procedure Note  Indications:  Based on my examination of this patient's wound(s)/ulcer(s) today, debridement is required to promote healing and evaluate the wound base. Performed by: Darcy Lau MD    Consent obtained:  Yes    Time out taken:  Yes    Pain Control:         Debridement:Non-excisional Debridement    Using curette the wound(s)/ulcer(s) was/were sharply debrided down through and including the removal of epidermis and dermis.         Devitalized Tissue Debrided:  fibrin, biofilm, slough, necrotic/eschar and exudate      Pre Debridement Measurements:  Are located in the Wound/Ulcer Documentation Flow Sheet    Wound/Ulcer #: 1    Percent of Wound(s)/Ulcer(s) Debrided: 100%    Total Surface Area Debrided:  0.66 sq cm       Diabetic/Pressure/Non Pressure Ulcers only:  Ulcer: Diabetic ulcer, fat layer exposed             Post Debridement Measurements:    Wound/Ulcer Descriptions are Pre Debridement --EXCEPT MEASUREMENTS    Wound 01/04/21 Groin Right wound 1- right groin abscess (Active)   Wound Image   01/29/21 1131   Wound Etiology Surgical 01/29/21 1131   Dressing Status Dry;Clean 01/29/21 1131   Wound Cleansed Not Cleansed 01/29/21 1131   Dressing/Treatment Packing;ABD 01/04/21 1323   Offloading for Diabetic Foot Ulcers No offloading required 01/14/21 1334   Wound Length (cm) 0.6 cm 01/29/21 1131   Wound Width (cm) 1.1 cm 01/29/21 1131   Wound Depth (cm) 0.1 cm 01/29/21 1131   Wound Surface Area (cm^2) 0.66 cm^2 01/29/21 1131   Change in Wound Size % (l*w) 85.33 01/29/21 1131   Wound Volume (cm^3) 0.07 cm^3 01/29/21 1131   Wound Healing % 99 01/29/21 1131   Post-Procedure Length (cm) 0.6 cm 01/29/21 1139   Post-Procedure Width (cm) 1.1 cm 01/29/21 1139   Post-Procedure Depth (cm) 0.1 cm 01/29/21 1139   Post-Procedure Surface Area (cm^2) 0.66 cm^2 01/29/21 1139   Post-Procedure Volume (cm^3) 0.07 cm^3 01/29/21 1139   Distance Tunneling (cm) 0 cm 01/29/21 1131   Tunneling Position ___ O'Clock 0 01/29/21 1131   Undermining Starts ___ O'Clock 0 01/29/21 1131   Undermining Ends___ O'Clock 0 01/29/21 1131   Undermining Maxium Distance (cm) 0 01/29/21 1131   Wound Assessment Pink/red 01/29/21 1131   Drainage Amount Scant 01/29/21 1131   Drainage Description Serosanguinous 01/22/21 1102   Odor None 01/29/21 1131   Chantal-wound Assessment Hyperpigmented 01/29/21 1131   Margins Attached edges 01/29/21 1131   Wound Thickness Description not for Pressure Injury Full thickness 01/29/21 1131   Number of days: 24             Estimated Blood Loss:  Minimal    Hemostasis Achieved:  by pressure and by silver nitrate stick    Procedural Pain:  0  / 10     Post Procedural Pain:  0 / 10     Response to treatment:  Well tolerated by patient.          Plan:     Problem List Items Addressed This Visit     Type 2 diabetes mellitus without complication, with long-term current use of insulin (HCC) - Primary (Chronic)    * (Principal) Right groin ulcer, with fat layer exposed ([address] (Chronic)          Cont current care    Treatment Note please see attached Discharge Instructions    In my professional opinion this patient would benefit from HBO Therapy: No    Written patient dismissal instructions given to patient and signed by patient or POA. Discharge [address] and Hyperbaric Oxygen Therapy   Physician Orders and Discharge Instructions  [facility]  Amol Johnson  Telephone: [phone]    NAME:  Vini Hassan:  1960  MEDICAL RECORD NUMBER:  [id number]  DATE:  1/29/2021    Discharge condition: Stable    Discharge to: Home    Left via:Private automobile    Accompanied by: Family     [facility]: [facility]     Dressing Orders:  Right groin wound: Soap and water wash, Thin layer of Instrasite gel,secure with dry gauze  and hy-tape 1-2 times daily     Treatment Orders:  Protein rich, multivitamin, keep glucose under 150     [address] [place] [address],[address] follow up visit ___________3 weeks__________________  (Please note your next appointment above and if you are unable to keep, kindly give a 24 hour notice. Thank you.)    If you experience any of the following, please call the [facility] during business hours:    * Increase in Pain  * Temperature over 101  * Increase in drainage from your wound  * Drainage with a foul odor  * Bleeding  * Increase in swelling  * Need for compression bandage changes due to slippage, breakthrough drainage. If you need medical attention outside of the business hours of the [facility] please contact your PCP or go to the nearest emergency room.         Electronically signed by Carmen Villafana MD on 1/29/2021 at 12:11 PM

## 2021-02-03 NOTE — PROGRESS NOTES
Physician Progress Note      Aretha Kearney  CSN #:                  [id number]  :                       1960  ADMIT DATE:       2020 2:20 PM  [address] [place] DATE:        2020 5:26 PM  RESPONDING  PROVIDER #:        Hugo Diana MD          QUERY TEXT:    Patient admitted with Cellulitis and abscess of the right thigh  with   documentation of I&D at bedside in ER. Lesion to the upper right thigh is   erythematous, indurated with central area approximately 3 cm x 4 cm that is   fluctuant and draining foul-smelling dark fluid. 1 cm x 1-1/2 cm area   overlying fluctuant area is necrotic. Cruciate incision type, submucosal with   scalpel 11 blade, packed with iodoform gauze and dressings BID. To accurately reflect the procedure performed please further specify the depth   of tissue incised and drained: The medical record reflects the following:    Risk Factors: Cellulitis, Diabetes, Morbid obesity  Clinical Indicators: Lesion right thigh hot to touch with  erythema and   indurated, Painful. Treatment:  Incision and drainage, Packed with Iodoform gauze. Zosyn,   Vancomycin IV. Home on Cephalexin 500 mg p.o. Options provided:  -- Skin only  -- Subcutaneous tissue  -- Fascia  -- Other - I will add my own diagnosis  -- Disagree - Not applicable / Not valid  -- Disagree - Clinically unable to determine / Unknown  -- Refer to Clinical Documentation Reviewer    PROVIDER RESPONSE TEXT:    Addendum to right thigh  procedure note: This depth of the drainage to   right thigh that was skin only.     Query created by: Brent Alvarez on 2/3/2021 1:21 PM      Electronically signed by:  Hugo Diana MD 2/3/2021 2:54 PM

## 2021-02-04 ENCOUNTER — OFFICE VISIT (OUTPATIENT)
Dept: INTERNAL MEDICINE | Age: 61
End: 2021-02-04
Payer: MEDICARE

## 2021-02-04 VITALS
HEIGHT: 67 IN | DIASTOLIC BLOOD PRESSURE: 72 MMHG | BODY MASS INDEX: 45.99 KG/M2 | HEART RATE: 101 BPM | SYSTOLIC BLOOD PRESSURE: 138 MMHG | OXYGEN SATURATION: 95 % | RESPIRATION RATE: 22 BRPM | WEIGHT: 293 LBS

## 2021-02-04 DIAGNOSIS — E66.01 MORBID OBESITY DUE TO EXCESS CALORIES (HCC): ICD-10-CM

## 2021-02-04 DIAGNOSIS — I10 ESSENTIAL HYPERTENSION: ICD-10-CM

## 2021-02-04 DIAGNOSIS — F09 COGNITIVE DYSFUNCTION: ICD-10-CM

## 2021-02-04 DIAGNOSIS — L98.492 RIGHT GROIN ULCER, WITH FAT LAYER EXPOSED (HCC): Chronic | ICD-10-CM

## 2021-02-04 DIAGNOSIS — F51.01 PRIMARY INSOMNIA: ICD-10-CM

## 2021-02-04 DIAGNOSIS — E11.65 UNCONTROLLED TYPE 2 DIABETES MELLITUS WITH HYPERGLYCEMIA (HCC): Primary | ICD-10-CM

## 2021-02-04 DIAGNOSIS — Z91.199 NONCOMPLIANCE: ICD-10-CM

## 2021-02-04 DIAGNOSIS — F33.1 MODERATE EPISODE OF RECURRENT MAJOR DEPRESSIVE DISORDER (HCC): ICD-10-CM

## 2021-02-04 PROCEDURE — 3017F COLORECTAL CA SCREEN DOC REV: CPT | Performed by: INTERNAL MEDICINE

## 2021-02-04 PROCEDURE — G8427 DOCREV CUR MEDS BY ELIG CLIN: HCPCS | Performed by: INTERNAL MEDICINE

## 2021-02-04 PROCEDURE — 1036F TOBACCO NON-USER: CPT | Performed by: INTERNAL MEDICINE

## 2021-02-04 PROCEDURE — G8417 CALC BMI ABV UP PARAM F/U: HCPCS | Performed by: INTERNAL MEDICINE

## 2021-02-04 PROCEDURE — 99214 OFFICE O/P EST MOD 30 MIN: CPT | Performed by: INTERNAL MEDICINE

## 2021-02-04 PROCEDURE — G8484 FLU IMMUNIZE NO ADMIN: HCPCS | Performed by: INTERNAL MEDICINE

## 2021-02-04 PROCEDURE — 2022F DILAT RTA XM EVC RTNOPTHY: CPT | Performed by: INTERNAL MEDICINE

## 2021-02-04 PROCEDURE — 3046F HEMOGLOBIN A1C LEVEL >9.0%: CPT | Performed by: INTERNAL MEDICINE

## 2021-02-04 NOTE — PROGRESS NOTES
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 but is not having good control. She denies fevers chills cough congestion her family described that she stays in her room and does not do much she does not have a schedule she stays up at night while others are asleep and sleeps in the day more. Several things we referred her to she did not answer phone calls we will record her daughter's phone number in the record.   [phone]---     Past Medical History:   Diagnosis Date    Acquired hypothyroidism     Diabetes ([facility] [facility]     Episode of recurrent major depressive disorder ([facility] 1/10/2021    Generalized osteoarthritis 3/21/2018    Hypothyroidism     Morbid obesity due to excess calories ([facility] [address] 6/24/2018    Movement disorder     joint pain    Type 2 diabetes mellitus without complication, with long-term current use of insulin ([facility])        Past Surgical History:   Procedure Laterality Date    CHOLECYSTECTOMY      TUBAL LIGATION         Family History   Problem Relation Age of Onset    Diabetes Mother     Bipolar Disorder Mother        Social History     Socioeconomic History    Marital status:      Spouse name: Not on file    Number of children: Not on file    Years of education: Not on file    Highest education level: Not on file   Occupational History    Not on file   Social Needs    Financial resource strain: Somewhat hard    Food insecurity     Worry: Never true     Inability: Never true  Transportation needs     Medical: Not on file     Non-medical: Not on file   Tobacco Use    Smoking status: Never Smoker    Smokeless tobacco: Never Used   Substance and Sexual Activity    Alcohol use: No    Drug use: No    Sexual activity: Yes     Partners: Male   Lifestyle    Physical activity     Days per week: Not on file     Minutes per session: Not on file    Stress: Not on file   Relationships    Social connections     Talks on phone: Not on file     Gets together: Not on file     Attends [employer] service: Not on file     Active member of club or organization: Not on file     Attends meetings of clubs or organizations: Not on file     Relationship status: Not on file    Intimate partner violence     Fear of current or ex partner: Not on file     Emotionally abused: Not on file     Physically abused: Not on file     Forced sexual activity: Not on file   Other Topics Concern    Not on file   Social History Narrative    Not on file       Allergies   Allergen Reactions    Compazine [Prochlorperazine Maleate] Anaphylaxis       Current Outpatient Medications   Medication Sig Dispense Refill    zolpidem (AMBIEN) 10 MG tablet TAKE 1 TABLET BY MOUTH EVERY NIGHT AS NEEDED FOR SLEEP 30 tablet 1    levothyroxine (SYNTHROID) 200 MCG tablet Take 1 tablet by mouth daily 90 tablet 3    Dulaglutide (TRULICITY) 5.36 DP/3.7RA SOPN Inject 0.75 mg into the skin once a week 15 pen 3    meloxicam (MOBIC) 15 MG tablet Take 1 tablet by mouth once daily 90 tablet 3    DULoxetine (CYMBALTA) 30 MG extended release capsule Take 1 capsule by mouth once daily 90 capsule 3    simvastatin (ZOCOR) 80 MG tablet TAKE ONE TABLET BY MOUTH ONCE DAILY (Patient taking differently: 40 mg ) 90 tablet 3    glucose blood VI test strips (FREESTYLE LITE) strip 1 each by In Vitro route 3 times daily As needed.  300 each 3  insulin aspart (NOVOLOG FLEXPEN) 100 UNIT/ML injection pen Take 6 units for bs 250-300, 12 units for bs 300-350, 18 units for bs over 350 15 pen 1    insulin glargine (BASAGLAR KWIKPEN) 100 UNIT/ML injection pen Inject 75 Units into the skin nightly (Patient not taking: Reported on 2/4/2021) 5 pen 3     No current facility-administered medications for this visit. Review of Systems   Constitutional: Positive for fatigue. Negative for chills and fever. HENT: Negative for congestion and sinus pressure. Eyes: Negative for discharge and redness. Respiratory: Negative for cough and shortness of breath. Cardiovascular: Positive for leg swelling. Negative for chest pain and palpitations. Gastrointestinal: Negative for abdominal distention and abdominal pain. Genitourinary: Negative for dysuria, frequency and urgency. Musculoskeletal: Positive for arthralgias. Negative for back pain. Skin: Positive for wound. Negative for rash. Neurological: Negative for dizziness, light-headedness and headaches. Psychiatric/Behavioral: Positive for decreased concentration, dysphoric mood and sleep disturbance. The patient is not nervous/anxious.          Patient with some cognitive dysfunction flat depressed affect       /72 (Site: Left Upper Arm, Position: Sitting)   Pulse 101   Resp 22   Ht 5' 7\" (1.702 m)   Wt 299 lb (135.6 kg)   SpO2 95%   BMI 46.83 kg/m²   BP Readings from Last 7 Encounters:   02/04/21 138/72   01/29/21 (!) 145/81   01/22/21 121/74   01/14/21 127/78   01/04/21 (!) 143/76   12/29/20 122/78   12/21/20 (!) 155/81     Wt Readings from Last 7 Encounters:   02/04/21 299 lb (135.6 kg)   01/29/21 (!) 305 lb (138.3 kg)   01/22/21 (!) 305 lb 6.4 oz (138.5 kg)   01/14/21 299 lb (135.6 kg)   01/04/21 299 lb (135.6 kg)   12/29/20 299 lb (135.6 kg)   12/20/20 288 lb 6.4 oz (130.8 kg)     BMI Readings from Last 7 Encounters:   02/04/21 46.83 kg/m²   01/29/21 47.77 kg/m² 01/22/21 47.83 kg/m²   01/14/21 46.83 kg/m²   01/04/21 46.83 kg/m²   12/29/20 46.83 kg/m²   12/20/20 45.17 kg/m²     Resp Readings from Last 7 Encounters:   02/04/21 22   01/29/21 20   01/22/21 20   01/14/21 16   01/04/21 20   12/21/20 20   12/20/20 20       Physical Exam  Constitutional:       General: She is not in acute distress. Appearance: She is well-developed. She is obese. Comments: Morbidly obese flat affect irritable with daughter when discuss getting her more help etc.   HENT:      Right Ear: External ear normal. Tympanic membrane is not injected. Left Ear: External ear normal. Tympanic membrane is not injected. Mouth/Throat:      Pharynx: No oropharyngeal exudate. Eyes:      General: No scleral icterus. Conjunctiva/sclera: Conjunctivae normal.   Neck:      Musculoskeletal: Neck supple. Thyroid: No thyroid mass or thyromegaly. Vascular: No carotid bruit. Cardiovascular:      Rate and Rhythm: Normal rate and regular rhythm. Heart sounds: S1 normal and S2 normal. No murmur. No S3 or S4 sounds. Comments: Distant heart and lung sounds  Pulmonary:      Effort: Pulmonary effort is normal. No respiratory distress. Breath sounds: Normal breath sounds. No wheezing or rales. Abdominal:      Comments: Obese abdomen   Musculoskeletal:      Right lower leg: Edema present. Left lower leg: Edema present. Lymphadenopathy:      Cervical: No cervical adenopathy. Upper Body:      Right upper body: No supraclavicular adenopathy. Left upper body: No supraclavicular adenopathy. Skin:     Findings: No rash. Comments: Discoloration and wound right groin with pinpoint lesion almost healed over   Neurological:      Mental Status: She is alert and oriented to person, place, and time. Cranial Nerves: No cranial nerve deficit.    Psychiatric:      Comments: Flat apathetic affect Results for orders placed or performed during the hospital encounter of 12/20/20   Culture, Wound    Specimen: Abscess   Result Value Ref Range    Gram Stain Result (A)      No Epithelial Cells seen  Many WBC's (Polymorphonuclear)  Many Gram positive cocci  in chains and pairs  Moderate Gram negative rods      Organism Staphylococcus epidermidis (A)     WOUND/ABSCESS Rare growth  No further workup       Organism Actinomyces turicensis (A)     WOUND/ABSCESS Rare growth  No further workup      CBC Auto Differential   Result Value Ref Range    WBC 11.1 (H) 4.8 - 10.8 K/uL    RBC 4.76 4.20 - 5.40 M/uL    Hemoglobin 14.4 12.0 - 16.0 g/dL    Hematocrit 43.9 37.0 - 47.0 %    MCV 92.2 81.0 - 99.0 fL    MCH 30.3 27.0 - 31.0 pg    MCHC 32.8 (L) 33.0 - 37.0 g/dL    RDW 13.1 11.5 - 14.5 %    Platelets 760 489 - 542 K/uL    MPV 9.5 9.4 - 12.3 fL    Neutrophils % 68.7 (H) 50.0 - 65.0 %    Lymphocytes % 21.0 20.0 - 40.0 %    Monocytes % 8.8 0.0 - 10.0 %    Eosinophils % 0.9 0.0 - 5.0 %    Basophils % 0.4 0.0 - 1.0 %    Neutrophils Absolute 7.6 (H) 1.5 - 7.5 K/uL    Immature Granulocytes # 0.0 K/uL    Lymphocytes Absolute 2.3 1.1 - 4.5 K/uL    Monocytes Absolute 1.00 (H) 0.00 - 0.90 K/uL    Eosinophils Absolute 0.10 0.00 - 0.60 K/uL    Basophils Absolute 0.00 0.00 - 0.20 K/uL   Comprehensive Metabolic Panel w/ Reflex to MG   Result Value Ref Range    Sodium 134 (L) 136 - 145 mmol/L    Potassium reflex Magnesium 4.0 3.5 - 5.0 mmol/L    Chloride 96 (L) 98 - 111 mmol/L    CO2 25 22 - 29 mmol/L    Anion Gap 13 7 - 19 mmol/L    Glucose 339 (H) 74 - 109 mg/dL    BUN 7 (L) 8 - 23 mg/dL    CREATININE 0.5 0.5 - 0.9 mg/dL    GFR Non-African American >60 >60    GFR African American >59 >59    Calcium 9.0 8.8 - 10.2 mg/dL    Total Protein 7.3 6.6 - 8.7 g/dL    Albumin 3.9 3.5 - 5.2 g/dL    Total Bilirubin 0.6 0.2 - 1.2 mg/dL    Alkaline Phosphatase 114 (H) 35 - 104 U/L    ALT 14 5 - 33 U/L    AST 20 5 - 32 U/L   Urinalysis Reflex to Culture Specimen: Urine, clean catch   Result Value Ref Range    Color, UA YELLOW Straw/Yellow    Clarity, UA Clear Clear    Glucose, Ur =>1000 Negative mg/dL    Bilirubin Urine Negative Negative    Ketones, Urine TRACE (A) Negative mg/dL    Specific Gravity, UA 1.031 1.005 - 1.030    Blood, Urine Negative Negative    pH, UA 5.5 5.0 - 8.0    Protein, UA Negative Negative mg/dL    Urobilinogen, Urine 0.2 <2.0 E.U./dL    Nitrite, Urine Negative Negative    Leukocyte Esterase, Urine Negative Negative   Sedimentation Rate   Result Value Ref Range    Sed Rate 32 (H) 0 - 25 mm/Hr   C-Reactive Protein   Result Value Ref Range    CRP 6.85 (H) 0.00 - 0.50 mg/dL   C-Reactive Protein   Result Value Ref Range    CRP 6.49 (H) 0.00 - 0.50 mg/dL   Hemoglobin A1c   Result Value Ref Range    Hemoglobin A1C 11.4 (H) 4.0 - 6.0 %   CBC   Result Value Ref Range    WBC 8.1 4.8 - 10.8 K/uL    RBC 4.30 4.20 - 5.40 M/uL    Hemoglobin 13.1 12.0 - 16.0 g/dL    Hematocrit 39.5 37.0 - 47.0 %    MCV 91.9 81.0 - 99.0 fL    MCH 30.5 27.0 - 31.0 pg    MCHC 33.2 33.0 - 37.0 g/dL    RDW 13.1 11.5 - 14.5 %    Platelets 116 286 - 697 K/uL    MPV 9.5 9.4 - 12.3 fL   Basic Metabolic Panel w/ Reflex to MG   Result Value Ref Range    Sodium 137 136 - 145 mmol/L    Potassium reflex Magnesium 3.9 3.5 - 5.0 mmol/L    Chloride 101 98 - 111 mmol/L    CO2 24 22 - 29 mmol/L    Anion Gap 12 7 - 19 mmol/L    Glucose 327 (H) 74 - 109 mg/dL    BUN 9 8 - 23 mg/dL    CREATININE 0.6 0.5 - 0.9 mg/dL    GFR Non-African American >60 >60    GFR African American >59 >59    Calcium 8.5 (L) 8.8 - 10.2 mg/dL   POCT GLUCOSE   Result Value Ref Range    Glucose 239 mg/dL    QC OK?  yes    POCT Glucose   Result Value Ref Range    POC Glucose 239 (H) 70 - 99 mg/dl    Performed on [employer]    POCT Glucose   Result Value Ref Range    POC Glucose 321 (H) 70 - 99 mg/dl    Performed on [employer]    POCT Glucose   Result Value Ref Range    POC Glucose 297 (H) 70 - 99 mg/dl Performed on [employer]    POCT Glucose   Result Value Ref Range    POC Glucose 253 (H) 70 - 99 mg/dl    Performed on [employer]    POCT Glucose   Result Value Ref Range    POC Glucose 251 (H) 70 - 99 mg/dl    Performed on [employer]    POCT Glucose   Result Value Ref Range    POC Glucose 235 (H) 70 - 99 mg/dl    Performed on [employer]        ASSESSMENT/ PLAN:  1. Uncontrolled type 2 diabetes mellitus with hyperglycemia ([facility] [address]  For multiple reasons we cannot get this patient on appropriate therapy when we have had her on better insulins/ etc in the past we got her diabetes in control. One of her children is a pharmacist patient is a retired nurse I think there are multiple layers to why she is not getting medications it is also sounds like her insurance will not cover but we have sent her to [facility] . Pt says she turned in paperwork but when my nurse called it is clear she has not turned in the completed paperwork. I discussed this with her she says she did. She has to be compliant has to take the appropriate medications we recommend that she be on Trulicity NovoLog and Basaglar we had wanted [place] but [facility] able to get it and Evandanieladarcy Lamarar was covered which will work also. we have explained this to the patient right now she is using Humulin N and NovoLog. She is able to get the N and the R insulin without a prescription so she may even be using some Humulin R. I have  really emphasized to her (and her daughter) the regimen we want to use which would be Basaglar nightly NovoLog FlexPen and Trulicity. She did get the Trulicity and she has started on that she is using the NovoLog FlexPen going to try again to get Basaglar. Recommend referral to the diabetic educator she declines. 2. Primary insomnia  Really needs to get off of Ambien but pretty adamant that she cannot sleep without it even though her sleep cycle is not appropriate we went ahead and renewed but our goal would be to eventually get off of this. - zolpidem (AMBIEN) 10 MG tablet; TAKE 1 TABLET BY MOUTH EVERY NIGHT AS NEEDED FOR SLEEP  Dispense: 30 tablet; Refill: 1    3. Cognitive dysfunction  Head injury in the past may have some cognitive dysfunction from that or early dementia related to that or related to her out-of-control diabetes for years there is also question of mood disorder and we recommended referral to psychiatry. Her family had requested Dr. Lyubov Newsome and we suggested this but the patient declines we will first get neuropsych testing (which would be very beneficial)  and reassess  - External Referral To Kenyon Austin MD, Neurology, [place]    4. Right groin ulcer, with fat layer exposed [facility])  Continue current care and f/u I highly encouraged she go to her last appointment with wound care    5. Noncompliance  She has to be compliant or we cannot get her health in control her family are concerned and aware we are working with them and others we will do the neuropsych testing refer to neurology see what we can manage. 6. Morbid obesity due to excess calories (HCC)  Weight loss of utmost importance Trulicity hopefully will help with this we need to follow closely    7. Essential hypertension  Blood pressure stable continue current care and follow    8.  Moderate episode of recurrent major depressive disorder [facility])    - External Referral To Behavioral Health    We highly recommend the COVID-19 vaccine

## 2021-02-15 DIAGNOSIS — E11.9 TYPE 2 DIABETES MELLITUS WITHOUT COMPLICATION, WITH LONG-TERM CURRENT USE OF INSULIN (HCC): ICD-10-CM

## 2021-02-15 DIAGNOSIS — Z79.4 TYPE 2 DIABETES MELLITUS WITHOUT COMPLICATION, WITH LONG-TERM CURRENT USE OF INSULIN (HCC): ICD-10-CM

## 2021-02-15 RX ORDER — INSULIN ASPART 100 [IU]/ML
INJECTION, SOLUTION INTRAVENOUS; SUBCUTANEOUS
Qty: 15 PEN | Refills: 1 | Status: SHIPPED | OUTPATIENT
Start: 2021-02-15 | End: 2021-07-14

## 2021-02-19 ENCOUNTER — NURSE TRIAGE (OUTPATIENT)
Dept: CALL CENTER | Facility: HOSPITAL | Age: 61
End: 2021-02-19

## 2021-02-19 ENCOUNTER — TELEPHONE (OUTPATIENT)
Dept: INTERNAL MEDICINE | Age: 61
End: 2021-02-19

## 2021-02-19 NOTE — TELEPHONE ENCOUNTER
This is a very complex situation and poorly controlled diabetic with multiple complaints  This patient needs to be evaluated in person by a provider  Recommend urgent care evaluation to make appropriate management/recommendation plans

## 2021-02-19 NOTE — TELEPHONE ENCOUNTER
Pt is a diabetic, she has not been taking care of herself. Daughter called casey because she is being very hateful and her memory is really bad all of a sudden. They are still waiting on her daily insulin to be delivered. Pts BS was 52, she ate a PB sandwich and a glass of milk. Last night her BS was 523. She took her novolog pen, novilin, and rapid acting all at the same time. Pt threatened to kill her if she called an ambulance. She stated that she called the [facility] Help line and they told her to check her BS because it was dropping so fast. Pts daughter then mentioned she is afraid she may have had a stroke. The nurse stated that all these signs went along with a diabetic that is not taking their medication correctly. Pt was advised to call Jeniffer Landeros today, and she has called multiple times and no one is answering. This [facility] explained that Dr. Stephanie Jeffers is out this week, she would like the doctor on call to call her to discuss everything.

## 2021-02-19 NOTE — TELEPHONE ENCOUNTER
Plans to call Dr. Hoyt's office this morning with glucose levels.  Recheck of BG during triage was 319 @0333AM.    Reason for Disposition  • [1] Blood glucose > 300 mg/dL (16.7 mmol/L) AND [2] uses insulin (e.g., insulin-dependent, all people with type 1 diabetes)    Additional Information  • Negative: Unconscious or difficult to awaken  • Negative: Acting confused (e.g., disoriented, slurred speech)  • Negative: Very weak (e.g., can't stand)  • Negative: Sounds like a life-threatening emergency to the triager  • Negative: [1] Vomiting AND [2] signs of dehydration (e.g., very dry mouth, lightheaded, dark urine)  • Negative: [1] Blood glucose > 240 mg/dL (13.3 mmol/L) AND [2] rapid breathing  • Negative: Blood glucose > 500 mg/dL (27.8 mmol/L)  • Negative: [1] Blood glucose > 240 mg/dL (13.3 mmol/L) AND [2] urine ketones moderate-large (or more than 1+)  • Negative: [1] Blood glucose > 240 mg/dL (13.3 mmol/L) AND [2] blood ketones > 1.4 mmol/L  • Negative: [1] Blood glucose > 240 mg/dL (13.3 mmol/L) AND [2] vomiting AND [3] unable to check for ketones (in blood or urine)  • Negative: [1] New onset diabetes suspected (e.g., frequent urination, weak, weight loss) AND [2] vomiting or rapid breathing  • Negative: Vomiting lasts > 4 hours  • Negative: Patient sounds very sick or weak to the triager  • Negative: Fever > 100.4 F (38.0 C)  • Negative: Blood glucose > 400 mg/dL (22.2 mmol/L)  • Negative: [1] Blood glucose > 300 mg/dL (16.7 mmol/L) AND [2] two or more times in a row  • Negative: Urine ketones moderate - large (or blood ketones > 1.4 mmol/L)  • Negative: [1] Caller has URGENT medication or insulin pump question AND [2] triager unable to answer question  • Negative: [1] Symptoms of high blood sugar (e.g., frequent urination, weak, weight loss) AND [2] not able to test blood glucose  • Negative: New onset diabetes suspected (e.g., frequent urination, weakness, weight loss)  • Negative: [1] Caller has NON-URGENT  "medication or insulin pump question AND [2] triager unable to answer question  • Negative: [1] Blood glucose 240 - 300 mg/dL (13.3 - 16.7 mmol/L) AND [2] uses insulin (e.g., insulin-dependent, all people with type 1 diabetes)    Answer Assessment - Initial Assessment Questions  1. BLOOD GLUCOSE: \"What is your blood glucose level?\"       500, 470, 300  2. ONSET: \"When did you check the blood glucose?\"      Last checked around 2AM  3. USUAL RANGE: \"What is your glucose level usually?\" (e.g., usual fasting morning value, usual evening value)      200  4. KETONES: \"Do you check for ketones (urine or blood test strips)?\" If yes, ask: \"What does the test show now?\"       no  5. TYPE 1 or 2:  \"Do you know what type of diabetes you have?\"  (e.g., Type 1, Type 2, Gestational; doesn't know)       T2  6. INSULIN: \"Do you take insulin?\" \"What type of insulin(s) do you use? What is the mode of delivery? (syringe, pen; injection or pump)?\"       Yes, injestion  7. DIABETES PILLS: \"Do you take any pills for your diabetes?\" If yes, ask: \"Have you missed taking any pills recently?\"      na  8. OTHER SYMPTOMS: \"Do you have any symptoms?\" (e.g., fever, frequent urination, difficulty breathing, dizziness, weakness, vomiting)      denies  9. PREGNANCY: \"Is there any chance you are pregnant?\" \"When was your last menstrual period?\"      na    Protocols used: DIABETES - HIGH BLOOD SUGAR-ADULT-AH      "

## 2021-02-19 NOTE — TELEPHONE ENCOUNTER
I spoke with Vivek Armijo and she said that she will try to get her to go to Urgent Care and take all insulins that she is taking. But she will probably not go. I have made her an appt. For Tuesday at 4pm. To see us. She reports that when blood sugar was 523, Violeta took 28 units of novalog flexpen and 20 unts of Humalin R and 75 units of nightly long acting insulin.

## 2021-02-21 RX ORDER — ZOLPIDEM TARTRATE 10 MG/1
TABLET ORAL
Qty: 30 TABLET | Refills: 1 | Status: SHIPPED | OUTPATIENT
Start: 2021-02-21 | End: 2021-06-03

## 2021-02-21 ASSESSMENT — ENCOUNTER SYMPTOMS
ABDOMINAL PAIN: 0
EYE REDNESS: 0
SINUS PRESSURE: 0
ABDOMINAL DISTENTION: 0
BACK PAIN: 0
SHORTNESS OF BREATH: 0
EYE DISCHARGE: 0
COUGH: 0

## 2021-02-23 ENCOUNTER — TELEPHONE (OUTPATIENT)
Dept: NEUROLOGY | Age: 61
End: 2021-02-23

## 2021-02-23 ENCOUNTER — OFFICE VISIT (OUTPATIENT)
Dept: INTERNAL MEDICINE | Age: 61
End: 2021-02-23
Payer: MEDICARE

## 2021-02-23 ENCOUNTER — TELEPHONE (OUTPATIENT)
Dept: INTERNAL MEDICINE | Age: 61
End: 2021-02-23

## 2021-02-23 ENCOUNTER — OFFICE VISIT (OUTPATIENT)
Dept: NEUROLOGY | Age: 61
End: 2021-02-23
Payer: MEDICARE

## 2021-02-23 VITALS
OXYGEN SATURATION: 95 % | BODY MASS INDEX: 45.99 KG/M2 | WEIGHT: 293 LBS | SYSTOLIC BLOOD PRESSURE: 120 MMHG | HEIGHT: 67 IN | DIASTOLIC BLOOD PRESSURE: 66 MMHG | HEART RATE: 105 BPM

## 2021-02-23 VITALS
HEIGHT: 67 IN | DIASTOLIC BLOOD PRESSURE: 87 MMHG | SYSTOLIC BLOOD PRESSURE: 132 MMHG | WEIGHT: 293 LBS | HEART RATE: 96 BPM | BODY MASS INDEX: 45.99 KG/M2

## 2021-02-23 DIAGNOSIS — R41.3 MEMORY LOSS: ICD-10-CM

## 2021-02-23 DIAGNOSIS — L98.492 RIGHT GROIN ULCER, WITH FAT LAYER EXPOSED (HCC): Chronic | ICD-10-CM

## 2021-02-23 DIAGNOSIS — Z91.199 NONCOMPLIANCE: ICD-10-CM

## 2021-02-23 DIAGNOSIS — Z87.820 HISTORY OF CLOSED HEAD INJURY: ICD-10-CM

## 2021-02-23 DIAGNOSIS — F33.1 MODERATE EPISODE OF RECURRENT MAJOR DEPRESSIVE DISORDER (HCC): ICD-10-CM

## 2021-02-23 DIAGNOSIS — E11.65 UNCONTROLLED TYPE 2 DIABETES MELLITUS WITH HYPERGLYCEMIA (HCC): Primary | ICD-10-CM

## 2021-02-23 DIAGNOSIS — R41.3 MEMORY LOSS: Primary | ICD-10-CM

## 2021-02-23 DIAGNOSIS — E11.65 UNCONTROLLED TYPE 2 DIABETES MELLITUS WITH HYPERGLYCEMIA (HCC): ICD-10-CM

## 2021-02-23 DIAGNOSIS — F09 COGNITIVE DYSFUNCTION: ICD-10-CM

## 2021-02-23 LAB
AMMONIA: 15 UMOL/L (ref 11–51)
T4 FREE: 1.23 NG/DL (ref 0.93–1.7)
TSH SERPL DL<=0.05 MIU/L-ACNC: 3.51 UIU/ML (ref 0.27–4.2)
VITAMIN B-12: 357 PG/ML (ref 211–946)

## 2021-02-23 PROCEDURE — 3046F HEMOGLOBIN A1C LEVEL >9.0%: CPT | Performed by: PSYCHIATRY & NEUROLOGY

## 2021-02-23 PROCEDURE — 3017F COLORECTAL CA SCREEN DOC REV: CPT | Performed by: INTERNAL MEDICINE

## 2021-02-23 PROCEDURE — 99214 OFFICE O/P EST MOD 30 MIN: CPT | Performed by: INTERNAL MEDICINE

## 2021-02-23 PROCEDURE — G8484 FLU IMMUNIZE NO ADMIN: HCPCS | Performed by: INTERNAL MEDICINE

## 2021-02-23 PROCEDURE — 1036F TOBACCO NON-USER: CPT | Performed by: PSYCHIATRY & NEUROLOGY

## 2021-02-23 PROCEDURE — 99204 OFFICE O/P NEW MOD 45 MIN: CPT | Performed by: PSYCHIATRY & NEUROLOGY

## 2021-02-23 PROCEDURE — 2022F DILAT RTA XM EVC RTNOPTHY: CPT | Performed by: INTERNAL MEDICINE

## 2021-02-23 PROCEDURE — 3046F HEMOGLOBIN A1C LEVEL >9.0%: CPT | Performed by: INTERNAL MEDICINE

## 2021-02-23 PROCEDURE — G8417 CALC BMI ABV UP PARAM F/U: HCPCS | Performed by: PSYCHIATRY & NEUROLOGY

## 2021-02-23 PROCEDURE — 1036F TOBACCO NON-USER: CPT | Performed by: INTERNAL MEDICINE

## 2021-02-23 PROCEDURE — G8427 DOCREV CUR MEDS BY ELIG CLIN: HCPCS | Performed by: PSYCHIATRY & NEUROLOGY

## 2021-02-23 PROCEDURE — G8417 CALC BMI ABV UP PARAM F/U: HCPCS | Performed by: INTERNAL MEDICINE

## 2021-02-23 PROCEDURE — 2022F DILAT RTA XM EVC RTNOPTHY: CPT | Performed by: PSYCHIATRY & NEUROLOGY

## 2021-02-23 PROCEDURE — 3017F COLORECTAL CA SCREEN DOC REV: CPT | Performed by: PSYCHIATRY & NEUROLOGY

## 2021-02-23 PROCEDURE — G8484 FLU IMMUNIZE NO ADMIN: HCPCS | Performed by: PSYCHIATRY & NEUROLOGY

## 2021-02-23 PROCEDURE — G8427 DOCREV CUR MEDS BY ELIG CLIN: HCPCS | Performed by: INTERNAL MEDICINE

## 2021-02-23 NOTE — PROGRESS NOTES
ambulation.      Active Ambulatory Problems     Diagnosis Date Noted    Acquired hypothyroidism     Essential hypertension     Mixed hyperlipidemia     Generalized osteoarthritis 03/21/2018    Morbid obesity due to excess calories ([address] 06/24/2018    Primary insomnia 11/01/2018    Abscess of right groin 12/20/2020    Right groin ulcer, with fat layer exposed ([address] 01/04/2021    Noncompliance 01/04/2021    Episode of recurrent major depressive disorder ([address] 01/10/2021    Cognitive dysfunction 01/10/2021    Uncontrolled type 2 diabetes mellitus with hyperglycemia ([address] 01/10/2021    Memory loss 02/23/2021     Resolved Ambulatory Problems     Diagnosis Date Noted    Type 2 diabetes mellitus without complication, with long-term current use of insulin ([address]     Screening mammogram, encounter for 03/21/2018    Medicare annual wellness visit, subsequent 03/10/2019     Past Medical History:   Diagnosis Date    Diabetes ([address]     Hypothyroidism     Movement disorder        Past Surgical History:   Procedure Laterality Date    CHOLECYSTECTOMY      TUBAL LIGATION         Family History   Problem Relation Age of Onset    Diabetes Mother     Bipolar Disorder Mother        Allergies   Allergen Reactions    Compazine [Prochlorperazine Maleate] Anaphylaxis       Social History     Socioeconomic History    Marital status:      Spouse name: Not on file    Number of children: Not on file    Years of education: Not on file    Highest education level: Not on file   Occupational History    Not on file   Social Needs    Financial resource strain: Somewhat hard    Food insecurity     Worry: Never true     Inability: Never true    Transportation needs     Medical: Not on file     Non-medical: Not on file   Tobacco Use    Smoking status: Never Smoker    Smokeless tobacco: Never Used   Substance and Sexual Activity    Alcohol use: No    Drug use: No    Sexual activity: Yes     Partners: Male Lifestyle    Physical activity     Days per week: Not on file     Minutes per session: Not on file    Stress: Not on file   Relationships    Social connections     Talks on phone: Not on file     Gets together: Not on file     Attends Rastafarian service: Not on file     Active member of club or organization: Not on file     Attends meetings of clubs or organizations: Not on file     Relationship status: Not on file    Intimate partner violence     Fear of current or ex partner: Not on file     Emotionally abused: Not on file     Physically abused: Not on file     Forced sexual activity: Not on file   Other Topics Concern    Not on file   Social History Narrative    Not on file     Review of Systems     Constitutional  No fever or chills. No diaphoresis or significant fatigue. HENT   No tinnitus or significant hearing loss. Eyes  no sudden vision change or eye pain  Respiratory  no significant shortness of breath or cough  Cardiovascular  no chest pain No palpitations or significant leg swelling  Gastrointestinal  no abdominal swelling or pain. Genitourinary  No difficulty urinating, dysuria  Musculoskeletal  no back pain or myalgia. Skin  no color change or rash  Neurologic  No seizures. No lateralizing weakness. Hematologic  no easy bruising or excessive bleeding. Psychiatric  yes severe anxiety or nervousness. All other review of systems are negative.       Current Outpatient Medications   Medication Sig Dispense Refill    zolpidem (AMBIEN) 10 MG tablet TAKE 1 TABLET BY MOUTH EVERY NIGHT AS NEEDED FOR SLEEP 30 tablet 1    insulin aspart (NOVOLOG FLEXPEN) 100 UNIT/ML injection pen Take 6 units for bs 250-300, 12 units for bs 300-350, 18 units for bs over 350 15 pen 1    levothyroxine (SYNTHROID) 200 MCG tablet Take 1 tablet by mouth daily 90 tablet 3    Dulaglutide (TRULICITY) 3.83 PU/4.4JA SOPN Inject 0.75 mg into the skin once a week (Patient taking differently: Inject 0.75 mg into IX and X- Palate not tested  CN XI-not test shoulder shrug  CN XII-Tongue midline with no fasciculations or fibrillations    Motor Exam  V/V throughout upper and lower extremities bilaterally, no cogwheeling, normal tone    Sensory Exam  Sensation intact to light touch and temperature upper and lower extremities bilaterally    Reflexes   2+ biceps bilaterally  2+ brachioradialis  2+patella  2+ ankle jerks  No clonus ankles  No Lozano's sign bilateral hands    Tremors- no tremors in hands or head noted    Gait  limping    Coordination  Finger to nose-unremarkable    No results found for: [id number]  Lab Results   Component Value Date    WBC 8.1 12/21/2020    HGB 13.1 12/21/2020    HCT 39.5 12/21/2020    MCV 91.9 12/21/2020     12/21/2020     Lab Results   Component Value Date     12/21/2020    K 3.9 12/21/2020     12/21/2020    CO2 24 12/21/2020    BUN 9 12/21/2020    CREATININE 0.6 12/21/2020    GLUCOSE 327 (H) 12/21/2020    CALCIUM 8.5 (L) 12/21/2020    PROT 7.3 12/20/2020    LABALBU 3.9 12/20/2020    BILITOT 0.6 12/20/2020    ALKPHOS 114 (H) 12/20/2020    AST 20 12/20/2020    ALT 14 12/20/2020    LABGLOM >60 12/21/2020    GFRAA >59 12/21/2020           Assessment    ICD-10-CM    1. Memory loss  R41. 3 Ammonia     Methylmalonic Acid, Serum     MRI BRAIN WO CONTRAST     T4, Free     TSH without Reflex     Vitamin B12   2. Uncontrolled type 2 diabetes mellitus with hyperglycemia (HCC)  E11.65    3. Right groin ulcer, with fat layer exposed [facility])  L98.492        Her neurological examination today was relatively unremarkable. She was alert oriented and was able to provide a detailed history. She had an appropriate affect. She did have some mild difficulty with delayed recall as she was able to recall 2 out of 3 works in 5 minutes. Based upon her history and examination it is unclear what the etiology of her cognitive issues are.  Certainly a combination of medications,history of intracerebral bleed, insomnia and psychosocial stressors are most likely contributing factors. At this time she will be scheduled for blood work, MRI of the brain and neurocognitive testing. The patient and daughter indicated understanding of the management plan. She is to follow up with me after testing to determine what further management is warranted. Plan    Orders Placed This Encounter   Procedures    MRI BRAIN WO CONTRAST    Ammonia    Methylmalonic Acid, Serum    T4, Free    TSH without Reflex    Vitamin B12       No orders of the defined types were placed in this encounter. Return if symptoms worsen or fail to improve. EMR Dragon/transcription disclaimer:Significant part of this  encounter note is electronic transcription/translation of spoken language to printed text. The electronic translation of spoken language may be erroneous, or at times, nonsensical words or phrases may be inadvertently transcribed.  Although I have reviewed the note for such errors, some may still exist.

## 2021-02-23 NOTE — TELEPHONE ENCOUNTER
Called patient about an MRI appointment,could not reach patient , called and spoke with patient daughter about the mri appointment daughter is aware of the mri appointment and location

## 2021-02-23 NOTE — PROGRESS NOTES
Chief Complaint   Patient presents with    Follow-up    Diabetes     elevated blood sugar - does not have Basaglar at home. she is still using Novolin R       HPI: Patient is here today for a problem visit and to follow-up diabetes noncompliance lots of issues getting meds we can get her diabetes in control if we can get the appropriate medications this is been next to impossible. She also is depressed forgetful had a closed head injury many years ago and is never been quite the same family would worry about bipolar disorder which some of the family have a history of they noticed cognitive dysfunction noncompliance irritability and general nonconcern for her own health.   Recent groin ulcer healed still with out-of-control diabetes denies chest pain dyspnea abdominal pain has no specific schedule sounds like she sleeps and eats and does things just randomly blood sugar terribly out of control [id number]    Past Medical History:   Diagnosis Date    Acquired hypothyroidism     Diabetes [facility]     Episode of recurrent major depressive disorder ([facility] 1/10/2021    Generalized osteoarthritis 3/21/2018    Hypothyroidism     Morbid obesity due to excess calories ([facility] 6/24/2018    Movement disorder     joint pain    Type 2 diabetes mellitus without complication, with long-term current use of insulin ([facility])        Past Surgical History:   Procedure Laterality Date    CHOLECYSTECTOMY      TUBAL LIGATION         Family History   Problem Relation Age of Onset    Diabetes Mother     Bipolar Disorder Mother        Social History     Socioeconomic History    Marital status:      Spouse name: Not on file    Number of children: Not on file    Years of education: Not on file    Highest education level: Not on file   Occupational History    Not on file   Social Needs    Financial resource strain: Somewhat hard    Food insecurity     Worry: Never true     Inability: Never true   [employer] needs Medical: Not on file     Non-medical: Not on file   Tobacco Use    Smoking status: Never Smoker    Smokeless tobacco: Never Used   Substance and Sexual Activity    Alcohol use: No    Drug use: No    Sexual activity: Yes     Partners: Male   Lifestyle    Physical activity     Days per week: Not on file     Minutes per session: Not on file    Stress: Not on file   Relationships    Social connections     Talks on phone: Not on file     Gets together: Not on file     Attends Mandaen service: Not on file     Active member of club or organization: Not on file     Attends meetings of clubs or organizations: Not on file     Relationship status: Not on file    Intimate partner violence     Fear of current or ex partner: Not on file     Emotionally abused: Not on file     Physically abused: Not on file     Forced sexual activity: Not on file   Other Topics Concern    Not on file   Social History Narrative    Not on file       Allergies   Allergen Reactions    Compazine [Prochlorperazine Maleate] Anaphylaxis       Current Outpatient Medications   Medication Sig Dispense Refill    simvastatin (ZOCOR) 80 MG tablet Take 1 tablet by mouth once daily 90 tablet 3    zolpidem (AMBIEN) 10 MG tablet TAKE 1 TABLET BY MOUTH EVERY NIGHT AS NEEDED FOR SLEEP 30 tablet 1    insulin aspart (NOVOLOG FLEXPEN) 100 UNIT/ML injection pen Take 6 units for bs 250-300, 12 units for bs 300-350, 18 units for bs over 350 15 pen 1    levothyroxine (SYNTHROID) 200 MCG tablet Take 1 tablet by mouth daily 90 tablet 3    Dulaglutide (TRULICITY) 7.23 KD/4.3RG SOPN Inject 0.75 mg into the skin once a week (Patient taking differently: Inject 0.75 mg into the skin once a week Patient is on week 3 as of 2/23/21) 15 pen 3    insulin glargine (BASAGLAR KWIKPEN) 100 UNIT/ML injection pen Inject 75 Units into the skin nightly (Patient not taking: Reported on 2/4/2021) 5 pen 3    meloxicam (MOBIC) 15 MG tablet Take 1 tablet by mouth once daily 90 tablet 3    DULoxetine (CYMBALTA) 30 MG extended release capsule Take 1 capsule by mouth once daily 90 capsule 3    glucose blood VI test strips (FREESTYLE LITE) strip 1 each by In Vitro route 3 times daily As needed. 300 each 3     No current facility-administered medications for this visit. Review of Systems    /66   Pulse 105   Ht 5' 7\" (1.702 m)   Wt 298 lb (135.2 kg)   SpO2 95%   BMI 46.67 kg/m²   BP Readings from Last 7 Encounters:   02/23/21 120/66   02/23/21 132/87   02/04/21 138/72   01/29/21 (!) 145/81   01/22/21 121/74   01/14/21 127/78   01/04/21 (!) 143/76     Wt Readings from Last 7 Encounters:   02/23/21 298 lb (135.2 kg)   02/23/21 299 lb (135.6 kg)   02/04/21 299 lb (135.6 kg)   01/29/21 (!) 305 lb (138.3 kg)   01/22/21 (!) 305 lb 6.4 oz (138.5 kg)   01/14/21 299 lb (135.6 kg)   01/04/21 299 lb (135.6 kg)     BMI Readings from Last 7 Encounters:   02/23/21 46.67 kg/m²   02/23/21 46.83 kg/m²   02/04/21 46.83 kg/m²   01/29/21 47.77 kg/m²   01/22/21 47.83 kg/m²   01/14/21 46.83 kg/m²   01/04/21 46.83 kg/m²     Resp Readings from Last 7 Encounters:   02/04/21 22   01/29/21 20   01/22/21 20   01/14/21 16   01/04/21 20   12/21/20 20   12/20/20 20       Physical Exam  Constitutional:       General: She is not in acute distress. Appearance: She is obese. Eyes:      General: No scleral icterus. Neck:      Musculoskeletal: Neck supple. Cardiovascular:      Heart sounds: Normal heart sounds. Pulmonary:      Breath sounds: Normal breath sounds. Lymphadenopathy:      Cervical: No cervical adenopathy. Skin:     Findings: No rash. Psychiatric:         Mood and Affect: Mood is depressed. Affect is labile and angry. Speech: Speech normal.         Behavior: Behavior is agitated.          Results for orders placed or performed in visit on 02/23/21   Vitamin B12   Result Value Ref Range    Vitamin B-12 357 211 - 946 pg/mL   TSH without Reflex   Result Value Ref Range    TSH 3.510 0.270 - 4.200 uIU/mL   T4, Free   Result Value Ref Range    T4 Free 1.23 0.93 - 1.70 ng/dL   Ammonia   Result Value Ref Range    Ammonia 15 11 - 51 umol/L       ASSESSMENT/ PLAN:  1. Uncontrolled type 2 diabetes mellitus with hyperglycemia ([facility]  This is getting difficult we have made multiple attempts to get her a better diabetes regimen but she cannot get the meds due to lack of insurance coverage we had her go to [facility] that we get messages back that signature missing from form other forms not filled out we do have her with the program for Trulicity and Arin Preston is supposedly covered we are going to write for that and she has a NovoLog FlexPen we have got to get her blood sugar in control. Patient needs to check her blood sugar 4 times a day at minimum and make constant insulin adjustments. She would benefit from a Dexcom monitor to assist with this. Very out-of-control blood sugar with dangerous hypoglycemia in the past.    2. Noncompliance  As above I think depression and apathy are getting in the way also some cognitive dysfunction getting in the way of her compliance with her medications    3. Memory loss  Neuropsych testing would help us differentiate what part of this is related to cognitive dysfunction she has a history of a head injury with some microvascular bleeding she has had some trouble with her memory ever since that she also has some irritability depression very difficult to him we need to get a lot of her medical problems better but has been very difficult because of lack of compliance    4. Moderate episode of recurrent major depressive disorder ([facility]  I would like to get neuropsych testing continue Cymbalta watch closely the refer to psychiatry post neuropsych testing    5.  Cognitive dysfunction  I think the most helpful thing today would be neuropsych testing we had her see neurology patient with a lot of resistance to interventions changes we reviewed neurology's note would be best if we could cut back on some of her medications also. She is supposed to get other lab tests and an MRI of the brain and follow-up with us neurology if worsens. She needs ongoing follow-up with neurology and/or psychiatry we will wait for these tests get neuropsych testing proceed from there    6.  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

## 2021-02-23 NOTE — TELEPHONE ENCOUNTER
I have called Yamila Kerr' office twice to get pt set up for referral to [address] [employer] messages both times.

## 2021-02-24 ENCOUNTER — TELEPHONE (OUTPATIENT)
Dept: INTERNAL MEDICINE | Age: 61
End: 2021-02-24

## 2021-02-24 NOTE — TELEPHONE ENCOUNTER
hetal is asking how many times a day does Violeta need to check her blood sugar and when (before meals or 2 hours after?)  Is three times a day good or less?

## 2021-03-02 ENCOUNTER — TELEPHONE (OUTPATIENT)
Dept: INTERNAL MEDICINE | Age: 61
End: 2021-03-02

## 2021-03-02 DIAGNOSIS — F09 COGNITIVE DYSFUNCTION: Primary | ICD-10-CM

## 2021-03-02 DIAGNOSIS — F33.1 MODERATE EPISODE OF RECURRENT MAJOR DEPRESSIVE DISORDER (HCC): ICD-10-CM

## 2021-03-02 NOTE — TELEPHONE ENCOUNTER
Spoke with daughter, I have tried calling Dr. Bishop Delcid' office so many times and no one will call me back or answer the phone. Daughter wants to know if we can refer to Dr. Marva Barrett. I'll need a new referral entered with his name on it.

## 2021-03-02 NOTE — TELEPHONE ENCOUNTER
I put in a referral when they go to Dr. Luis Powers he will probably need the neuropsych testing but he can get a hold of Dr. Jamila Sampson at that time  No Dr. Luis Powers to psychiatry the other test was neurocognitive/psych testing do you know if anyone in [facility] does neuro psych testing if so let me know and I will refer to them I think Diana Hanson used to do that if if somebody can let me know who does it I could refer there and to Dr. Yara Hair

## 2021-03-05 RX ORDER — SIMVASTATIN 80 MG
TABLET ORAL
Qty: 90 TABLET | Refills: 3 | Status: SHIPPED | OUTPATIENT
Start: 2021-03-05 | End: 2022-06-22

## 2021-03-10 PROBLEM — Z87.820 HISTORY OF CLOSED HEAD INJURY: Status: ACTIVE | Noted: 2021-03-10

## 2021-03-11 ENCOUNTER — HOSPITAL ENCOUNTER (OUTPATIENT)
Dept: MRI IMAGING | Age: 61
Discharge: HOME | End: 2021-03-11
Payer: MEDICARE

## 2021-03-11 ENCOUNTER — TELEPHONE (OUTPATIENT)
Dept: INTERNAL MEDICINE | Age: 61
End: 2021-03-11

## 2021-03-11 DIAGNOSIS — R41.3 MEMORY LOSS: ICD-10-CM

## 2021-03-11 DIAGNOSIS — E11.65 UNCONTROLLED TYPE 2 DIABETES MELLITUS WITH HYPERGLYCEMIA (HCC): Primary | ICD-10-CM

## 2021-03-11 PROCEDURE — 70551 MRI BRAIN STEM W/O DYE: CPT

## 2021-03-11 RX ORDER — INSULIN GLARGINE 100 [IU]/ML
84 INJECTION, SOLUTION SUBCUTANEOUS NIGHTLY
COMMUNITY
End: 2021-10-21 | Stop reason: ALTCHOICE

## 2021-03-11 NOTE — TELEPHONE ENCOUNTER
----- Message from Valarie Rodriguez MD sent at 3/10/2021  9:10 PM CST -----  Please call her daughter the numbers listed in the HPI part of her note check if she is taking Basaglar and Trulicity and the NovoLog and what her blood sugars are doing. We talked about referral to endocrine in [facility] see if they would be okay with that if it could be done as a virtual visit. Check if she ever got in with Dr. Valadez Kinds we did several referrals to neuropsych testing with Dr. Michael Vyas but Tosin Marcial was not able to get a hold of them may be also try to fax a referral 1 more time to Dr. Michael Vyas office with my note.

## 2021-03-11 NOTE — TELEPHONE ENCOUNTER
I put a referal to Dr Gutierrez Damon and I put a referal to Syl Dawkins to help with diet and getting a continous monitor  Also see If Stephany can fax/ call Dr Korina Kelly office one more time to do neurospysch test or if she know if anyone in [facility] does that testing since he is not respondign to us    ------Also have her increase her increase her basaglar to 84 units at hs and let us know bs readings in about 2 weeks

## 2021-03-15 ENCOUNTER — TELEPHONE (OUTPATIENT)
Dept: NEUROLOGY | Age: 61
End: 2021-03-15

## 2021-03-15 NOTE — TELEPHONE ENCOUNTER
Called and spoke with patient to let her know when I have her memory test scheduled. Patient is aware of the appointment time/date.

## 2021-03-15 NOTE — TELEPHONE ENCOUNTER
Patient daughter Sukhdev Flores left message stating that she thought an appointment for a cognitive test was supposed to be made but it was never scheduled. Do you know anything about this?

## 2021-04-07 DIAGNOSIS — E11.65 UNCONTROLLED TYPE 2 DIABETES MELLITUS WITH HYPERGLYCEMIA (HCC): Primary | ICD-10-CM

## 2021-04-07 DIAGNOSIS — E78.2 MIXED HYPERLIPIDEMIA: ICD-10-CM

## 2021-04-07 DIAGNOSIS — E11.65 UNCONTROLLED TYPE 2 DIABETES MELLITUS WITH HYPERGLYCEMIA (HCC): ICD-10-CM

## 2021-04-07 DIAGNOSIS — E03.9 ACQUIRED HYPOTHYROIDISM: ICD-10-CM

## 2021-04-07 LAB
ALBUMIN SERPL-MCNC: 3.9 G/DL (ref 3.5–5.2)
ALP BLD-CCNC: 116 U/L (ref 35–104)
ALT SERPL-CCNC: 28 U/L (ref 5–33)
ANION GAP SERPL CALCULATED.3IONS-SCNC: 12 MMOL/L (ref 7–19)
AST SERPL-CCNC: 25 U/L (ref 5–32)
BILIRUB SERPL-MCNC: 0.4 MG/DL (ref 0.2–1.2)
BUN BLDV-MCNC: 13 MG/DL (ref 8–23)
CALCIUM SERPL-MCNC: 9.5 MG/DL (ref 8.8–10.2)
CHLORIDE BLD-SCNC: 96 MMOL/L (ref 98–111)
CHOLESTEROL, TOTAL: 209 MG/DL (ref 160–199)
CO2: 30 MMOL/L (ref 22–29)
CREAT SERPL-MCNC: 0.7 MG/DL (ref 0.5–0.9)
GFR AFRICAN AMERICAN: >59
GFR NON-AFRICAN AMERICAN: >60
GLUCOSE BLD-MCNC: 317 MG/DL (ref 74–109)
HBA1C MFR BLD: 10.1 % (ref 4–6)
HCT VFR BLD CALC: 45.3 % (ref 37–47)
HDLC SERPL-MCNC: 78 MG/DL (ref 65–121)
HEMOGLOBIN: 14.9 G/DL (ref 12–16)
LDL CHOLESTEROL CALCULATED: 104 MG/DL
MCH RBC QN AUTO: 29.8 PG (ref 27–31)
MCHC RBC AUTO-ENTMCNC: 32.9 G/DL (ref 33–37)
MCV RBC AUTO: 90.6 FL (ref 81–99)
PDW BLD-RTO: 13.1 % (ref 11.5–14.5)
PLATELET # BLD: 313 K/UL (ref 130–400)
PMV BLD AUTO: 9.7 FL (ref 9.4–12.3)
POTASSIUM SERPL-SCNC: 4 MMOL/L (ref 3.5–5)
RBC # BLD: 5 M/UL (ref 4.2–5.4)
SODIUM BLD-SCNC: 138 MMOL/L (ref 136–145)
TOTAL PROTEIN: 7.2 G/DL (ref 6.6–8.7)
TRIGL SERPL-MCNC: 134 MG/DL (ref 0–149)
TSH SERPL DL<=0.05 MIU/L-ACNC: 9.45 UIU/ML (ref 0.27–4.2)
WBC # BLD: 7.8 K/UL (ref 4.8–10.8)

## 2021-04-14 ENCOUNTER — TELEPHONE (OUTPATIENT)
Dept: INTERNAL MEDICINE | Age: 61
End: 2021-04-14

## 2021-04-14 NOTE — TELEPHONE ENCOUNTER
Juan Kahn needs added to last note that she checks blood sugar 4 times a day and makes constant insulin adjustments. For Dexcom.

## 2021-04-20 ENCOUNTER — OFFICE VISIT (OUTPATIENT)
Dept: NEUROLOGY | Age: 61
End: 2021-04-20
Payer: MEDICARE

## 2021-04-20 VITALS
BODY MASS INDEX: 45.99 KG/M2 | WEIGHT: 293 LBS | DIASTOLIC BLOOD PRESSURE: 81 MMHG | SYSTOLIC BLOOD PRESSURE: 137 MMHG | HEART RATE: 116 BPM | HEIGHT: 67 IN

## 2021-04-20 DIAGNOSIS — R41.3 MEMORY LOSS: Primary | ICD-10-CM

## 2021-04-20 PROCEDURE — 96130 PSYCL TST EVAL PHYS/QHP 1ST: CPT | Performed by: PSYCHIATRY & NEUROLOGY

## 2021-04-20 NOTE — PROGRESS NOTES
Chief Complaint   Patient presents with    Memory Loss     memory test follow up, the patient has been having some trouble while driving and forgetting where she is going         Isabel Pereira is a 61y.o. year old female who is seen for evaluation of cognitive impairment. The patient is here with her daughter and indicates in 2009 she fell and had an intracerebral hemorrhage. She did have cognitive issues following this but her issues improved to the point where her daughter indicates she was relatively normal about 3 years ago. Over the last 1-2 years has had some cognitive issues that the family his concerned about. 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 The patient says she forgets to take her insulin prior to her meals and by the time she thinks about it her blood sugar is already elevated. She has chronic issues with sleep and has been on Ambien 1/2 tab at night for 1-2 years according to her. She indicates she has also been on Cymbalta for her mood issues over this time. Her daughter who is an adult has live with her and they have a terrible relation to the point where they do not speak to each other. She indicates that without the Cymbalta she will start yelling her her daughter. This has worsened over the last year or two. Her mind is constantly active. She denies any hallucinations or significant changes in personality. She is not good with names which is normal for her. She had an episode where her granddaughter was stung by a bee and the patient did not take appropriate action to help her. The patient's granddaughter was taken to the hospital and had to receive and epi pen to avoid anaphylaxis. Her daughter does not let the patient babysit her granddaughter due to fear that the patient has poor judgement. The patient does not feel her cognitive issues are that significant.  She is able to do all of her activities of daily living She had chronic knee issues which limits her ambulation. Some issues with driving.       Active Ambulatory Problems     Diagnosis Date Noted    Acquired hypothyroidism     Essential hypertension     Mixed hyperlipidemia     Generalized osteoarthritis 03/21/2018    Morbid obesity due to excess calories ([address] 06/24/2018    Primary insomnia 11/01/2018    Abscess of right groin 12/20/2020    Right groin ulcer, with fat layer exposed ([address] 01/04/2021    Noncompliance 01/04/2021    Episode of recurrent major depressive disorder ([address] 01/10/2021    Cognitive dysfunction 01/10/2021    Uncontrolled type 2 diabetes mellitus with hyperglycemia ([address] 01/10/2021    Memory loss 02/23/2021    History of closed head injury 03/10/2021     Resolved Ambulatory Problems     Diagnosis Date Noted    Type 2 diabetes mellitus without complication, with long-term current use of insulin ([address]     Screening mammogram, encounter for 03/21/2018    Medicare annual wellness visit, subsequent 03/10/2019     Past Medical History:   Diagnosis Date    Diabetes ([address]     Hypothyroidism     Movement disorder        Past Surgical History:   Procedure Laterality Date    CHOLECYSTECTOMY      TUBAL LIGATION         Family History   Problem Relation Age of Onset    Diabetes Mother     Bipolar Disorder Mother        Allergies   Allergen Reactions    Compazine [Prochlorperazine Maleate] Anaphylaxis       Social History     Socioeconomic History    Marital status:      Spouse name: Not on file    Number of children: Not on file    Years of education: Not on file    Highest education level: Not on file   Occupational History    Not on file   Social Needs    Financial resource strain: Somewhat hard    Food insecurity     Worry: Never true     Inability: Never true    Transportation needs     Medical: Not on file     Non-medical: Not on file   Tobacco Use    Smoking status: Never Smoker    Smokeless tobacco: Never Used   Substance and Sexual Activity    Alcohol use: No    Drug use: No    Sexual activity: Yes     Partners: Male   Lifestyle    Physical activity     Days per week: Not on file     Minutes per session: Not on file    Stress: Not on file   Relationships    Social connections     Talks on phone: Not on file     Gets together: Not on file     Attends Orthodoxy service: Not on file     Active member of club or organization: Not on file     Attends meetings of clubs or organizations: Not on file     Relationship status: Not on file    Intimate partner violence     Fear of current or ex partner: Not on file     Emotionally abused: Not on file     Physically abused: Not on file     Forced sexual activity: Not on file   Other Topics Concern    Not on file   Social History Narrative    Not on file     Review of Systems     Constitutional  No fever or chills. No diaphoresis or significant fatigue. HENT   No tinnitus or significant hearing loss. Eyes  no sudden vision change or eye pain  Respiratory  no significant shortness of breath or cough  Cardiovascular  no chest pain No palpitations or significant leg swelling  Gastrointestinal  no abdominal swelling or pain. Genitourinary  No difficulty urinating, dysuria  Musculoskeletal  no back pain or myalgia. Skin  no color change or rash  Neurologic  No seizures. No lateralizing weakness. Hematologic  no easy bruising or excessive bleeding. Psychiatric  yes severe anxiety or nervousness. All other review of systems are negative.       Current Outpatient Medications   Medication Sig Dispense Refill    insulin glargine (BASAGLAR KWIKPEN) 100 UNIT/ML injection pen Inject 84 Units into the skin nightly      simvastatin (ZOCOR) 80 MG tablet Take 1 tablet by mouth once daily 90 tablet 3    insulin aspart (NOVOLOG FLEXPEN) 100 UNIT/ML injection pen Take 6 units for bs 250-300, 12 units for bs 300-350, 18 units for bs over 350 15 pen 1    levothyroxine (SYNTHROID) 200 MCG tablet Take 1 tablet by mouth daily 90 tablet 3    Dulaglutide (TRULICITY) 6.47 WW/8.7BJ SOPN Inject 0.75 mg into the skin once a week (Patient taking differently: Inject 0.75 mg into the skin once a week Patient is on week 3 as of 2/23/21) 15 pen 3    meloxicam (MOBIC) 15 MG tablet Take 1 tablet by mouth once daily 90 tablet 3    DULoxetine (CYMBALTA) 30 MG extended release capsule Take 1 capsule by mouth once daily 90 capsule 3    glucose blood VI test strips (FREESTYLE LITE) strip 1 each by In Vitro route 3 times daily As needed. 300 each 3    zolpidem (AMBIEN) 10 MG tablet TAKE 1 TABLET BY MOUTH EVERY NIGHT AS NEEDED FOR SLEEP 30 tablet 1     No current facility-administered medications for this visit. /81   Pulse 116   Ht 5' 7\" (1.702 m)   Wt 298 lb (135.2 kg)   BMI 46.67 kg/m²     Constitutional  well developed, well nourished. Eyes  conjunctiva normal.  Ear, nose, throat - No scars, masses, or lesions over external nose or ears, no atrophy of tongue  Neck-symmetric, no masses noted, no jugular vein distension  Respiration- chest wall appears symmetric, good expansion,   normal effort without use of accessory muscles  Musculoskeletal  no significant wasting of muscles noted, no bony deformities  Extremities-no clubbing, cyanosis or edema  Skin  warm, dry, and intact. No rash, erythema, or pallor.   Psychiatric  mood, affect, and behavior appear normal.      Neurological exam  Awake, alert, fluent oriented  appropriate affect  Attention and concentration appear appropriate  Recent and remote memory appears unremarkable  Speech normal without dysarthria  No clear issues with language of fund of knowledge    Cranial Nerve Exam     CN III, IV,VI-EOMI, No nystagmus, conjugate eye movements, no ptosis  CN VII-no facial assymetry        Motor Exam  antigravity throughout upper and lower extremities bilaterally    Tremors- no tremors in hands or head noted    Gait  limping    Lab Results Component Value Date    [id number] [id number] 02/23/2021     Lab Results   Component Value Date    WBC 7.8 04/07/2021    HGB 14.9 04/07/2021    HCT 45.3 04/07/2021    MCV 90.6 04/07/2021     04/07/2021     Lab Results   Component Value Date     04/07/2021    K 4.0 04/07/2021    CL 96 (L) 04/07/2021    CO2 30 (H) 04/07/2021    BUN 13 04/07/2021    CREATININE 0.7 04/07/2021    GLUCOSE 317 (H) 04/07/2021    CALCIUM 9.5 04/07/2021    PROT 7.2 04/07/2021    LABALBU 3.9 04/07/2021    BILITOT 0.4 04/07/2021    ALKPHOS 116 (H) 04/07/2021    AST 25 04/07/2021    ALT 28 04/07/2021    LABGLOM >60 04/07/2021    GFRAA >59 04/07/2021     Narrative   MRI BRAIN WO CONTRAST    3/11/2021 11:47 AM   History: Memory loss and dementia. Noncontrast MR imaging of the brain. Comparison is made with 30 January 2015. Mild small vessel disease within the periventricular white matter. Old 7 x 5 mm lacunar infarct in the left basal ganglia region. No sign of intracranial hemorrhage. Ventricle size is appropriate. Paranasal sinuses are clear. No significant atrophy. No acute parenchymal ischemia based on the DWI sequence.       Impression   1. Small vessel disease with no acute intracranial abnormality. Signed by Dr Joann Short on 3/11/2021 11:48 AM           Assessment    ICD-10-CM    1. Memory loss  R41.3        Her neurological examination previously was relatively unremarkable. On her initial visit she was alert oriented and was able to provide a detailed history. She had an appropriate affect. She did have some mild difficulty with delayed recall as she was able to recall 2 out of 3 works in 5 minutes. Based upon her history and examination it is unclear what the etiology of her cognitive issues are. Certainly a combination of medications,history of intracerebral bleed, insomnia and psychosocial stressors are most likely contributing factors. Her blood work was unremarkable.  Her MRI of the brain had a chronic left basal ganglia infarct. Her and neurocognitive testing revealed moderate impairment in memory, executive function, attention and information processing with severe verbal function. . These findings suggest mild cognitive impairment of unclear significance. The patient and daughter indicated understanding of the management plan. She is to follow up with me in 6 months and call with any issues. At least 31 minutes were spent in the evaluation and interpretation of the neurocognitive test along with discussion of the results and a treatment plan with the patient and any family members    Plan    No orders of the defined types were placed in this encounter. No orders of the defined types were placed in this encounter. Return in about 6 months (around 10/20/2021). EMR Dragon/transcription disclaimer:Significant part of this  encounter note is electronic transcription/translation of spoken language to printed text. The electronic translation of spoken language may be erroneous, or at times, nonsensical words or phrases may be inadvertently transcribed.  Although I have reviewed the note for such errors, some may still exist.

## 2021-04-27 RX ORDER — DULOXETIN HYDROCHLORIDE 30 MG/1
CAPSULE, DELAYED RELEASE ORAL
Qty: 90 CAPSULE | Refills: 3 | Status: SHIPPED | OUTPATIENT
Start: 2021-04-27 | End: 2022-10-27 | Stop reason: ALTCHOICE

## 2021-05-17 ENCOUNTER — OFFICE VISIT (OUTPATIENT)
Dept: INTERNAL MEDICINE | Age: 61
End: 2021-05-17
Payer: MEDICARE

## 2021-05-17 VITALS
HEIGHT: 67 IN | DIASTOLIC BLOOD PRESSURE: 74 MMHG | BODY MASS INDEX: 45.99 KG/M2 | HEART RATE: 102 BPM | WEIGHT: 293 LBS | OXYGEN SATURATION: 96 % | SYSTOLIC BLOOD PRESSURE: 120 MMHG

## 2021-05-17 DIAGNOSIS — E11.65 UNCONTROLLED TYPE 2 DIABETES MELLITUS WITH HYPERGLYCEMIA (HCC): Primary | ICD-10-CM

## 2021-05-17 DIAGNOSIS — E55.9 VITAMIN D DEFICIENCY: ICD-10-CM

## 2021-05-17 DIAGNOSIS — Z11.59 ENCOUNTER FOR HEPATITIS C SCREENING TEST FOR LOW RISK PATIENT: ICD-10-CM

## 2021-05-17 DIAGNOSIS — I10 ESSENTIAL HYPERTENSION: ICD-10-CM

## 2021-05-17 DIAGNOSIS — F09 COGNITIVE DYSFUNCTION: ICD-10-CM

## 2021-05-17 DIAGNOSIS — E78.2 MIXED HYPERLIPIDEMIA: ICD-10-CM

## 2021-05-17 DIAGNOSIS — E03.9 ACQUIRED HYPOTHYROIDISM: ICD-10-CM

## 2021-05-17 DIAGNOSIS — E66.01 MORBID OBESITY DUE TO EXCESS CALORIES (HCC): ICD-10-CM

## 2021-05-17 DIAGNOSIS — Z11.4 SCREENING FOR HIV (HUMAN IMMUNODEFICIENCY VIRUS): ICD-10-CM

## 2021-05-17 LAB — HBA1C MFR BLD: 9.5 %

## 2021-05-17 PROCEDURE — 99214 OFFICE O/P EST MOD 30 MIN: CPT | Performed by: INTERNAL MEDICINE

## 2021-05-17 PROCEDURE — 3046F HEMOGLOBIN A1C LEVEL >9.0%: CPT | Performed by: INTERNAL MEDICINE

## 2021-05-17 PROCEDURE — 83036 HEMOGLOBIN GLYCOSYLATED A1C: CPT | Performed by: INTERNAL MEDICINE

## 2021-05-17 PROCEDURE — G8427 DOCREV CUR MEDS BY ELIG CLIN: HCPCS | Performed by: INTERNAL MEDICINE

## 2021-05-17 PROCEDURE — 1036F TOBACCO NON-USER: CPT | Performed by: INTERNAL MEDICINE

## 2021-05-17 PROCEDURE — 3017F COLORECTAL CA SCREEN DOC REV: CPT | Performed by: INTERNAL MEDICINE

## 2021-05-17 PROCEDURE — 2022F DILAT RTA XM EVC RTNOPTHY: CPT | Performed by: INTERNAL MEDICINE

## 2021-05-17 PROCEDURE — G8417 CALC BMI ABV UP PARAM F/U: HCPCS | Performed by: INTERNAL MEDICINE

## 2021-05-17 RX ORDER — BLOOD-GLUCOSE TRANSMITTER
EACH MISCELLANEOUS
Qty: 3 EACH | Refills: 3 | Status: SHIPPED | OUTPATIENT
Start: 2021-05-17 | End: 2022-01-24 | Stop reason: ALTCHOICE

## 2021-05-17 RX ORDER — BLOOD-GLUCOSE SENSOR
EACH MISCELLANEOUS
Qty: 3 EACH | Refills: 3 | Status: SHIPPED | OUTPATIENT
Start: 2021-05-17 | End: 2022-01-24 | Stop reason: ALTCHOICE

## 2021-05-17 RX ORDER — BLOOD-GLUCOSE,RECEIVER,CONT
EACH MISCELLANEOUS
Qty: 3 DEVICE | Refills: 3 | Status: SHIPPED | OUTPATIENT
Start: 2021-05-17 | End: 2022-01-24 | Stop reason: ALTCHOICE

## 2021-05-17 NOTE — PROGRESS NOTES
Chief Complaint   Patient presents with    3 Month Follow-Up    Diabetes       HPI: Pt goes to bed 9-10 in am and sleeps until 4-5 pm.  Days and nights reversed. Family live with her - she stays in her room a lot. Fatigue. Some arthralgias. Seems depressed but also denies depression - says just has different routine etc.      Past Medical History:   Diagnosis Date    Acquired hypothyroidism     Diabetes ([facility]     Episode of recurrent major depressive disorder ([facility] 1/10/2021    Generalized osteoarthritis 3/21/2018    Hypothyroidism     Morbid obesity due to excess calories ([facility] 6/24/2018    Movement disorder     joint pain    Type 2 diabetes mellitus without complication, with long-term current use of insulin ([facility])        Past Surgical History:   Procedure Laterality Date    CHOLECYSTECTOMY      TUBAL LIGATION         Family History   Problem Relation Age of Onset    Diabetes Mother     Bipolar Disorder Mother        Social History     Socioeconomic History    Marital status:      Spouse name: Not on file    Number of children: Not on file    Years of education: Not on file    Highest education level: Not on file   Occupational History    Not on file   Tobacco Use    Smoking status: Never Smoker    Smokeless tobacco: Never Used   Substance and Sexual Activity    Alcohol use: No    Drug use: No    Sexual activity: Yes     Partners: Male   Other Topics Concern    Not on file   Social History Narrative    Not on file     Social Determinants of Health     Financial Resource Strain: Medium Risk    Difficulty of Paying Living Expenses: Somewhat hard   Food Insecurity: No Food Insecurity    Worried About Running Out of Food in the Last Year: Never true    Gretchen of Food in the Last Year: Never true   Transportation Needs:     Lack of Transportation (Medical):      Lack of Transportation (Non-Medical):    Physical Activity:     Days of Exercise per Week:     Minutes of Exercise per Session:    Stress:     Feeling of Stress :    Social Connections:     Frequency of Communication with Friends and Family:     Frequency of Social Gatherings with Friends and Family:     Attends Scientologist Services:     Active Member of Clubs or Organizations:     Attends Club or Organization Meetings:     Marital Status:    Intimate Partner Violence:     Fear of Current or Ex-Partner:     Emotionally Abused:     Physically Abused:     Sexually Abused: Allergies   Allergen Reactions    Compazine [Prochlorperazine Maleate] Anaphylaxis       Current Outpatient Medications   Medication Sig Dispense Refill    Continuous Blood Gluc Transmit (DEXCOM G6 TRANSMITTER) MISC Use as directed 3 each 3    Continuous Blood Gluc Sensor (DEXCOM G6 SENSOR) MISC Use as directed 3 each 3    Continuous Blood Gluc  (DEXCOM G6 ) SAFIA Use as directed 3 Device 3    zolpidem (AMBIEN) 10 MG tablet TAKE 1 TABLET BY MOUTH EVERY NIGHT AS NEEDED FOR SLEEP 30 tablet 1    DULoxetine (CYMBALTA) 30 MG extended release capsule Take 1 capsule by mouth once daily 90 capsule 3    insulin glargine (BASAGLAR KWIKPEN) 100 UNIT/ML injection pen Inject 84 Units into the skin nightly      simvastatin (ZOCOR) 80 MG tablet Take 1 tablet by mouth once daily 90 tablet 3    insulin aspart (NOVOLOG FLEXPEN) 100 UNIT/ML injection pen Take 6 units for bs 250-300, 12 units for bs 300-350, 18 units for bs over 350 15 pen 1    levothyroxine (SYNTHROID) 200 MCG tablet Take 1 tablet by mouth daily 90 tablet 3    Dulaglutide (TRULICITY) 2.20 SY/3.3LS SOPN Inject 0.75 mg into the skin once a week (Patient taking differently: Inject 0.75 mg into the skin once a week Patient is on week 3 as of 2/23/21) 15 pen 3    meloxicam (MOBIC) 15 MG tablet Take 1 tablet by mouth once daily 90 tablet 3    glucose blood VI test strips (FREESTYLE LITE) strip 1 each by In Vitro route 3 times daily As needed.  300 each 3     No current facility-administered medications for this visit. Review of Systems    /74   Pulse 102   Ht 5' 7\" (1.702 m)   Wt (!) 311 lb (141.1 kg)   SpO2 96%   BMI 48.71 kg/m²   BP Readings from Last 7 Encounters:   05/17/21 120/74   04/20/21 137/81   02/23/21 120/66   02/23/21 132/87   02/04/21 138/72   01/29/21 (!) 145/81   01/22/21 121/74     Wt Readings from Last 7 Encounters:   05/17/21 (!) 311 lb (141.1 kg)   04/20/21 298 lb (135.2 kg)   02/23/21 298 lb (135.2 kg)   02/23/21 299 lb (135.6 kg)   02/04/21 299 lb (135.6 kg)   01/29/21 (!) 305 lb (138.3 kg)   01/22/21 (!) 305 lb 6.4 oz (138.5 kg)     BMI Readings from Last 7 Encounters:   05/17/21 48.71 kg/m²   04/20/21 46.67 kg/m²   02/23/21 46.67 kg/m²   02/23/21 46.83 kg/m²   02/04/21 46.83 kg/m²   01/29/21 47.77 kg/m²   01/22/21 47.83 kg/m²     Resp Readings from Last 7 Encounters:   02/04/21 22   01/29/21 20   01/22/21 20   01/14/21 16   01/04/21 20   12/21/20 20   12/20/20 20       Physical Exam  Constitutional:       General: She is not in acute distress. Eyes:      General: No scleral icterus. Cardiovascular:      Heart sounds: Normal heart sounds. Pulmonary:      Breath sounds: Normal breath sounds. Musculoskeletal:      Cervical back: Neck supple. Right lower leg: Edema present. Left lower leg: Edema present. Lymphadenopathy:      Cervical: No cervical adenopathy. Skin:     Findings: No rash.    Psychiatric:      Comments: Mood is aggitated - a little upset re: situation etc        Visual inspection:  Deformity/amputation: absent  Skin lesions/pre-ulcerative calluses: present - calluses at end toes   Edema: right- 1+, left- 1+    Sensory exam:  Monofilament sensation: abnormal - decreased sensation   (minimum of 5 random plantar locations tested, avoiding callused areas - > 1 area with absence of sensation is + for neuropathy)    Plus at least one of the following:  Pulses: abnormal - decreased pulses,   Pinprick: Impaired Proprioception: Impaired  Vibration (128 Hz): N/A    Results for orders placed or performed in visit on 05/17/21   POCT glycosylated hemoglobin (Hb A1C)   Result Value Ref Range    Hemoglobin A1C 9.5 %     Lab Results   Component Value Date    LABA1C 9.5 05/17/2021    LABA1C 10.1 04/07/2021    LABA1C 11.4 12/20/2020    LABA1C 11.3 12/18/2019       ASSESSMENT/ PLAN:  1. Uncontrolled type 2 diabetes mellitus with hyperglycemia ([facility]  Hopefully can get the Dexcom monitor this would assist with her diabetic management she needs to see the endocrinologist follow closely  - Continuous Blood Gluc Transmit (DEXCOM G6 TRANSMITTER) MISC; Use as directed  Dispense: 3 each; Refill: 3  - Continuous Blood Gluc Sensor (DEXCOM G6 SENSOR) MISC; Use as directed  Dispense: 3 each; Refill: 3  - Continuous Blood Gluc  (DEXCOM G6 ) SAFIA; Use as directed  Dispense: 3 Device; Refill: 3  - POCT glycosylated hemoglobin (Hb A1C)  - External Referral To Podiatry  -  DIABETES FOOT EXAM  - CBC; Future  - Comprehensive Metabolic Panel; Future  - Hemoglobin A1C; Future    2. Mixed hyperlipidemia  High-dose statin therapy follow lipid panel and monitor  - Lipid Panel; Future    3. Acquired hypothyroidism  Thyroid with multiple medical issues and symptoms that could be accounted for by thyroid condition.  - TSH without Reflex; Future    4. Encounter for hepatitis C screening test for low risk patient  Routine hepatitis C screening recommended  - Hepatitis C Antibody; Future    5. Screening for HIV (human immunodeficiency virus)  Routine HIV screening recommended  - HIV Rapid 1&2; Future    6. Vitamin D deficiency  Follow vitamin D and supplement as needed  - Vitamin D 25 Hydroxy; Future  - Microalbumin / Creatinine Urine Ratio; Future    7. Cognitive dysfunction  Head injury in the past seems to contributed to some cognitive dysfunction some depression other factors that contribute difficult home situation.   We had her see neurology continue to follow I reviewed Dr. Pito Arvizu note for 2021 neurology felt the combination medication intracerebral bleed insomnia and psychosocial stressors contributed MRI of the brain with a chronic left basal ganglia infarct moderate memory impairment on neurocognitive testing    8. Essential hypertension  bp is stable -- follow     9. Morbid obesity due to excess calories [facility])  Patient needs more of routine in his schedule and compliance with diet. Trulicity has been added for diabetes hopefully will also help with weight loss.   Dexcom monitor will help with compliance     We recommend the COVID-19 vaccine

## 2021-06-12 NOTE — TELEPHONE ENCOUNTER
Do not get her novolin R we want her on that---   Prefer to give her tresiba 100units at hs if they can get her that and trulicity 1.62NS weekly and novolog flex pen ssi  No novolin r  And no nph and no basaglar  -- if cant get these let me know none

## 2021-07-14 DIAGNOSIS — Z79.4 TYPE 2 DIABETES MELLITUS WITHOUT COMPLICATION, WITH LONG-TERM CURRENT USE OF INSULIN (HCC): ICD-10-CM

## 2021-07-14 DIAGNOSIS — E11.9 TYPE 2 DIABETES MELLITUS WITHOUT COMPLICATION, WITH LONG-TERM CURRENT USE OF INSULIN (HCC): ICD-10-CM

## 2021-07-14 RX ORDER — INSULIN ASPART 100 [IU]/ML
INJECTION, SOLUTION INTRAVENOUS; SUBCUTANEOUS
Qty: 15 ML | Refills: 3 | Status: SHIPPED | OUTPATIENT
Start: 2021-07-14 | End: 2021-10-21 | Stop reason: ALTCHOICE

## 2021-07-14 NOTE — TELEPHONE ENCOUNTER
Aldean Myron called requesting a refill of the below medication which has been pended for you:     Requested Prescriptions     Pending Prescriptions Disp Refills    insulin aspart (NOVOLOG FLEXPEN) 100 UNIT/ML injection pen [Pharmacy Med Name: NovoLOG FlexPen 100 UNIT/ML Subcutaneous Solution Pen-injector (insulin aspart)] 15 mL 3     Sig: Inject 6 units for blood sugar 250-300, 12 units for blood sugar 300-350, 18 units for blood sugar over 350 as directed       Last Appointment Date: 5/17/2021  Next Appointment Date: 7/19/2021    Allergies   Allergen Reactions    Compazine [Prochlorperazine Maleate] Anaphylaxis

## 2021-07-16 DIAGNOSIS — E03.9 ACQUIRED HYPOTHYROIDISM: ICD-10-CM

## 2021-07-16 DIAGNOSIS — E78.2 MIXED HYPERLIPIDEMIA: ICD-10-CM

## 2021-07-16 DIAGNOSIS — E11.65 UNCONTROLLED TYPE 2 DIABETES MELLITUS WITH HYPERGLYCEMIA (HCC): ICD-10-CM

## 2021-07-16 DIAGNOSIS — Z11.4 SCREENING FOR HIV (HUMAN IMMUNODEFICIENCY VIRUS): ICD-10-CM

## 2021-07-16 DIAGNOSIS — Z11.59 ENCOUNTER FOR HEPATITIS C SCREENING TEST FOR LOW RISK PATIENT: ICD-10-CM

## 2021-07-16 DIAGNOSIS — E55.9 VITAMIN D DEFICIENCY: ICD-10-CM

## 2021-07-16 LAB
ALBUMIN SERPL-MCNC: 4.3 G/DL (ref 3.5–5.2)
ALP BLD-CCNC: 102 U/L (ref 35–104)
ALT SERPL-CCNC: 24 U/L (ref 5–33)
ANION GAP SERPL CALCULATED.3IONS-SCNC: 13 MMOL/L (ref 7–19)
AST SERPL-CCNC: 22 U/L (ref 5–32)
BILIRUB SERPL-MCNC: 0.4 MG/DL (ref 0.2–1.2)
BUN BLDV-MCNC: 13 MG/DL (ref 8–23)
CALCIUM SERPL-MCNC: 9.7 MG/DL (ref 8.8–10.2)
CHLORIDE BLD-SCNC: 101 MMOL/L (ref 98–111)
CHOLESTEROL, TOTAL: 206 MG/DL (ref 160–199)
CO2: 30 MMOL/L (ref 22–29)
CREAT SERPL-MCNC: 0.6 MG/DL (ref 0.5–0.9)
GFR AFRICAN AMERICAN: >59
GFR NON-AFRICAN AMERICAN: >60
GLUCOSE BLD-MCNC: 69 MG/DL (ref 74–109)
HBA1C MFR BLD: 9.4 % (ref 4–6)
HCT VFR BLD CALC: 46 % (ref 37–47)
HDLC SERPL-MCNC: 72 MG/DL (ref 65–121)
HEMOGLOBIN: 15.6 G/DL (ref 12–16)
HEPATITIS C ANTIBODY INTERPRETATION: NORMAL
HIV-1 P24 AG: NORMAL
LDL CHOLESTEROL CALCULATED: 94 MG/DL
MCH RBC QN AUTO: 31.6 PG (ref 27–31)
MCHC RBC AUTO-ENTMCNC: 33.9 G/DL (ref 33–37)
MCV RBC AUTO: 93.1 FL (ref 81–99)
PDW BLD-RTO: 13.2 % (ref 11.5–14.5)
PLATELET # BLD: 328 K/UL (ref 130–400)
PMV BLD AUTO: 9.9 FL (ref 9.4–12.3)
POTASSIUM SERPL-SCNC: 3.8 MMOL/L (ref 3.5–5)
RAPID HIV 1&2: NORMAL
RBC # BLD: 4.94 M/UL (ref 4.2–5.4)
SODIUM BLD-SCNC: 144 MMOL/L (ref 136–145)
TOTAL PROTEIN: 7.6 G/DL (ref 6.6–8.7)
TRIGL SERPL-MCNC: 198 MG/DL (ref 0–149)
TSH SERPL DL<=0.05 MIU/L-ACNC: 13.78 UIU/ML (ref 0.27–4.2)
VITAMIN D 25-HYDROXY: 19.1 NG/ML
WBC # BLD: 9.5 K/UL (ref 4.8–10.8)

## 2021-07-19 ENCOUNTER — OFFICE VISIT (OUTPATIENT)
Dept: INTERNAL MEDICINE | Age: 61
End: 2021-07-19
Payer: MEDICARE

## 2021-07-19 VITALS
OXYGEN SATURATION: 97 % | WEIGHT: 293 LBS | DIASTOLIC BLOOD PRESSURE: 70 MMHG | HEART RATE: 91 BPM | HEIGHT: 67 IN | BODY MASS INDEX: 45.99 KG/M2 | SYSTOLIC BLOOD PRESSURE: 110 MMHG

## 2021-07-19 DIAGNOSIS — E03.9 ACQUIRED HYPOTHYROIDISM: ICD-10-CM

## 2021-07-19 DIAGNOSIS — Z00.00 MEDICARE ANNUAL WELLNESS VISIT, SUBSEQUENT: Primary | ICD-10-CM

## 2021-07-19 DIAGNOSIS — Z12.31 SCREENING MAMMOGRAM, ENCOUNTER FOR: ICD-10-CM

## 2021-07-19 DIAGNOSIS — E78.2 MIXED HYPERLIPIDEMIA: ICD-10-CM

## 2021-07-19 DIAGNOSIS — F09 COGNITIVE DYSFUNCTION: ICD-10-CM

## 2021-07-19 DIAGNOSIS — E11.65 UNCONTROLLED TYPE 2 DIABETES MELLITUS WITH HYPERGLYCEMIA (HCC): ICD-10-CM

## 2021-07-19 DIAGNOSIS — E66.01 MORBID OBESITY DUE TO EXCESS CALORIES (HCC): ICD-10-CM

## 2021-07-19 DIAGNOSIS — Z00.00 ROUTINE GENERAL MEDICAL EXAMINATION AT A HEALTH CARE FACILITY: ICD-10-CM

## 2021-07-19 DIAGNOSIS — I10 ESSENTIAL HYPERTENSION: ICD-10-CM

## 2021-07-19 PROCEDURE — G8417 CALC BMI ABV UP PARAM F/U: HCPCS | Performed by: INTERNAL MEDICINE

## 2021-07-19 PROCEDURE — G8427 DOCREV CUR MEDS BY ELIG CLIN: HCPCS | Performed by: INTERNAL MEDICINE

## 2021-07-19 PROCEDURE — 3046F HEMOGLOBIN A1C LEVEL >9.0%: CPT | Performed by: INTERNAL MEDICINE

## 2021-07-19 PROCEDURE — G0439 PPPS, SUBSEQ VISIT: HCPCS | Performed by: INTERNAL MEDICINE

## 2021-07-19 PROCEDURE — 99213 OFFICE O/P EST LOW 20 MIN: CPT | Performed by: INTERNAL MEDICINE

## 2021-07-19 PROCEDURE — 1036F TOBACCO NON-USER: CPT | Performed by: INTERNAL MEDICINE

## 2021-07-19 PROCEDURE — 3017F COLORECTAL CA SCREEN DOC REV: CPT | Performed by: INTERNAL MEDICINE

## 2021-07-19 PROCEDURE — 2022F DILAT RTA XM EVC RTNOPTHY: CPT | Performed by: INTERNAL MEDICINE

## 2021-07-19 RX ORDER — LEVOTHYROXINE SODIUM 0.2 MG/1
TABLET ORAL
Qty: 105 TABLET | Refills: 3 | Status: SHIPPED | OUTPATIENT
Start: 2021-07-19 | End: 2021-10-21 | Stop reason: SDUPTHER

## 2021-07-19 ASSESSMENT — PATIENT HEALTH QUESTIONNAIRE - PHQ9
SUM OF ALL RESPONSES TO PHQ QUESTIONS 1-9: 0
2. FEELING DOWN, DEPRESSED OR HOPELESS: 0
SUM OF ALL RESPONSES TO PHQ QUESTIONS 1-9: 0
1. LITTLE INTEREST OR PLEASURE IN DOING THINGS: 0
SUM OF ALL RESPONSES TO PHQ QUESTIONS 1-9: 0
SUM OF ALL RESPONSES TO PHQ9 QUESTIONS 1 & 2: 0

## 2021-07-19 NOTE — PROGRESS NOTES
Chief Complaint   Patient presents with    Medicare AWV    Diabetes       HPI: Patient is here today for Medicare annual this visit and to follow-up diabetes and other medical issues. Patient is still with out-of-control diabetes out-of-control with hyperglycemia but she is also had episodes of hypoglycemia which make it very difficult. We have tried to get her a Dexcom monitor tried multiple things offered referral to endocrinology. Patient with issues with compliance. Fatigue and seems unconcerned. No cp or dyspnea or abd pain.     Past Medical History:   Diagnosis Date    Acquired hypothyroidism     [facility]     Episode of recurrent major depressive disorder ([facility] [address] 1/10/2021    Generalized osteoarthritis 3/21/2018    Hypothyroidism     Morbid obesity due to excess calories ([facility] [address] 6/24/2018    Movement disorder     joint pain    Type 2 diabetes mellitus without complication, with long-term current use of insulin ([facility])        Past Surgical History:   Procedure Laterality Date    CHOLECYSTECTOMY      TUBAL LIGATION         Family History   Problem Relation Age of Onset    Diabetes Mother     Bipolar Disorder Mother        Social History     Socioeconomic History    Marital status:      Spouse name: Not on file    Number of children: Not on file    Years of education: Not on file    Highest education level: Not on file   Occupational History    Not on file   Tobacco Use    Smoking status: Never Smoker    Smokeless tobacco: Never Used   Substance and Sexual Activity    Alcohol use: No    Drug use: No    Sexual activity: Yes     Partners: Male   Other Topics Concern    Not on file   Social History Narrative    Not on file     Social Determinants of Health     Financial Resource Strain: Medium Risk    Difficulty of Paying Living Expenses: Somewhat hard   Food Insecurity: No Food Insecurity    Worried About Running Out of Food in the Last Year: Never true    Gretchen of Food in the Last Year: Never true   Transportation Needs:     Lack of Transportation (Medical):  Lack of Transportation (Non-Medical):    Physical Activity:     Days of Exercise per Week:     Minutes of Exercise per Session:    Stress:     Feeling of Stress :    Social Connections:     Frequency of Communication with Friends and Family:     Frequency of Social Gatherings with Friends and Family:     Attends Hindu Services:     Active Member of Clubs or Organizations:     Attends Club or Organization Meetings:     Marital Status:    Intimate Partner Violence:     Fear of Current or Ex-Partner:     Emotionally Abused:     Physically Abused:     Sexually Abused:         Allergies   Allergen Reactions    Compazine [Prochlorperazine Maleate] Anaphylaxis       Current Outpatient Medications   Medication Sig Dispense Refill    levothyroxine (SYNTHROID) 200 MCG tablet Take 1 pill daily except take 1 1/2 pills on Sunday and Thursday 105 tablet 3    insulin aspart (NOVOLOG FLEXPEN) 100 UNIT/ML injection pen Inject 6 units for blood sugar 250-300, 12 units for blood sugar 300-350, 18 units for blood sugar over 350 as directed 15 mL 3    zolpidem (AMBIEN) 10 MG tablet TAKE 1 TABLET BY MOUTH EVERY NIGHT AS NEEDED FOR SLEEP 30 tablet 1    Continuous Blood Gluc Transmit (DEXCOM G6 TRANSMITTER) MISC Use as directed 3 each 3    Continuous Blood Gluc Sensor (DEXCOM G6 SENSOR) MISC Use as directed 3 each 3    Continuous Blood Gluc  (DEXCOM G6 ) SAFIA Use as directed 3 Device 3    DULoxetine (CYMBALTA) 30 MG extended release capsule Take 1 capsule by mouth once daily 90 capsule 3    insulin glargine (BASAGLAR KWIKPEN) 100 UNIT/ML injection pen Inject 84 Units into the skin nightly      simvastatin (ZOCOR) 80 MG tablet Take 1 tablet by mouth once daily 90 tablet 3    Dulaglutide (TRULICITY) 5.59 IL/7.8TI SOPN Inject 0.75 mg into the skin once a week (Patient taking differently: Inject 0.75 mg into the skin once a week Patient is on week 3 as of 2/23/21) 15 pen 3    meloxicam (MOBIC) 15 MG tablet Take 1 tablet by mouth once daily 90 tablet 3    glucose blood VI test strips (FREESTYLE LITE) strip 1 each by In Vitro route 3 times daily As needed. 300 each 3     No current facility-administered medications for this visit.        Review of Systems    /70   Pulse 91   Ht 5' 7\" (1.702 m)   Wt (!) 312 lb (141.5 kg)   SpO2 97%   BMI 48.87 kg/m²   BP Readings from Last 7 Encounters:   07/19/21 110/70   05/17/21 120/74   04/20/21 137/81   02/23/21 120/66   02/23/21 132/87   02/04/21 138/72   01/29/21 (!) 145/81     Wt Readings from Last 7 Encounters:   07/19/21 (!) 312 lb (141.5 kg)   05/17/21 (!) 311 lb (141.1 kg)   04/20/21 298 lb (135.2 kg)   02/23/21 298 lb (135.2 kg)   02/23/21 299 lb (135.6 kg)   02/04/21 299 lb (135.6 kg)   01/29/21 (!) 305 lb (138.3 kg)     BMI Readings from Last 7 Encounters:   07/19/21 48.87 kg/m²   05/17/21 48.71 kg/m²   04/20/21 46.67 kg/m²   02/23/21 46.67 kg/m²   02/23/21 46.83 kg/m²   02/04/21 46.83 kg/m²   01/29/21 47.77 kg/m²     Resp Readings from Last 7 Encounters:   02/04/21 22   01/29/21 20   01/22/21 20   01/14/21 16   01/04/21 20   12/21/20 20   12/20/20 20       Physical Exam    Results for orders placed or performed in visit on 07/16/21   CBC   Result Value Ref Range    WBC 9.5 4.8 - 10.8 K/uL    RBC 4.94 4.20 - 5.40 M/uL    Hemoglobin 15.6 12.0 - 16.0 g/dL    Hematocrit 46.0 37.0 - 47.0 %    MCV 93.1 81.0 - 99.0 fL    MCH 31.6 (H) 27.0 - 31.0 pg    MCHC 33.9 33.0 - 37.0 g/dL    RDW 13.2 11.5 - 14.5 %    Platelets 004 484 - 520 K/uL    MPV 9.9 9.4 - 12.3 fL   Comprehensive Metabolic Panel   Result Value Ref Range    Sodium 144 136 - 145 mmol/L    Potassium 3.8 3.5 - 5.0 mmol/L    Chloride 101 98 - 111 mmol/L    CO2 30 (H) 22 - 29 mmol/L    Anion Gap 13 7 - 19 mmol/L    Glucose 69 (L) 74 - 109 mg/dL    BUN 13 8 - 23 mg/dL    CREATININE 0.6 0.5 - 0.9 mg/dL    GFR Non- >60 >60    GFR African American >59 >59    Calcium 9.7 8.8 - 10.2 mg/dL    Total Protein 7.6 6.6 - 8.7 g/dL    Albumin 4.3 3.5 - 5.2 g/dL    Total Bilirubin 0.4 0.2 - 1.2 mg/dL    Alkaline Phosphatase 102 35 - 104 U/L    ALT 24 5 - 33 U/L    AST 22 5 - 32 U/L   Hemoglobin A1C   Result Value Ref Range    Hemoglobin A1C 9.4 (H) 4.0 - 6.0 %   TSH without Reflex   Result Value Ref Range    TSH 13.780 (H) 0.270 - 4.200 uIU/mL   Lipid Panel   Result Value Ref Range    Cholesterol, Total 206 (H) 160 - 199 mg/dL    Triglycerides 198 (H) 0 - 149 mg/dL    HDL 72 65 - 121 mg/dL    LDL Calculated 94 <100 mg/dL   Vitamin D 25 Hydroxy   Result Value Ref Range    Vit D, 25-Hydroxy 19.1 (L) >=30 ng/mL   Hepatitis C Antibody   Result Value Ref Range    Hep C Ab Interp Non-Reactive    HIV Rapid 1&2   Result Value Ref Range    Rapid HIV 1&2 Non-reactive Non-reactive    HIV-1 P24 Ag Non-reactive Non-reactive       ASSESSMENT/ PLAN:  1. Medicare annual wellness visit, subsequent  Chart, medications, labs, vaccines reviewed. Keep up to date with routine care and follow up. Call with any problems or complaints. Keep up to date with routine screening recomendations and vaccines. 2. UncontrolledDM with hyperglycemia  issues with that prior to the head injury but cognition and mental status have seemed different since that time we tried to refer to psychiatry we have referred her to neurology she saw them in the past also microscopic hemorrhage when this occurred. - External Referral To Ophthalmology  - CBC; Future  - Comprehensive Metabolic Panel; Future  - TSH without Reflex; Future    3. Screening mammogram, encounter for  Screen mammogram   - CHRISTIANO DIGITAL SCREEN W OR WO CAD BILATERAL; Future    4. Cognitive dysfunction  History of a head injury has just had ongoing issue with noncompliance    5. Essential hypertension  bp is stable - watch closely     6.  Acquired hypothyroidism  Increased synthroid dose - be cautious and monitor closely  - levothyroxine (SYNTHROID) 200 MCG tablet; Take 1 pill daily except take 1 1/2 pills on  and Thursday  Dispense: 105 tablet; Refill: 3    7. Mixed hyperlipidemia  Continue with high-dose statin therapy    8. Morbid obesity due to excess calories ([address]  Weight loss diabetic diet of utmost importance again offered referral to dietitian Tera Crane patient declines                  Medicare Annual Wellness Visit  Name: Luciana Andersen Date: 2021   MRN: [id number] Sex: Female   Age: 64 y.o. Ethnicity: Non- / Non    : 1960 Race: White (non-)      Sara Bartlett is here for Medicare AWV and Diabetes    Screenings for behavioral, psychosocial and functional/safety risks, and cognitive dysfunction are all negative except as indicated below. These results, as well as other patient data from the [address] form, are documented in Flowsheets linked to this Encounter. Allergies   Allergen Reactions    Compazine [Prochlorperazine Maleate] Anaphylaxis         Prior to Visit Medications    Medication Sig Taking?  Authorizing Provider   levothyroxine (SYNTHROID) 200 MCG tablet Take 1 pill daily except take 1 1/2 pills on  and Thursday Yes Santi Stewart MD   insulin aspart (NOVOLOG FLEXPEN) 100 UNIT/ML injection pen Inject 6 units for blood sugar 250-300, 12 units for blood sugar 300-350, 18 units for blood sugar over 350 as directed  Santi Stewart MD   zolpidem (AMBIEN) 10 MG tablet TAKE 1 TABLET BY MOUTH EVERY NIGHT AS NEEDED FOR SLEEP  Santi Stewart MD   Continuous Blood Gluc Transmit (DEXCOM G6 TRANSMITTER) MISC Use as directed  Santi Stewart MD   Continuous Blood Gluc Sensor (DEXCOM G6 SENSOR) MISC Use as directed  Santi Stewart MD   Continuous Blood Gluc  (DEXCOM G6 ) SAFIA Use as directed  Santi Stewart MD   DULoxetine (CYMBALTA) 30 MG extended release capsule Take 1 capsule by mouth once daily  Santi Stewart MD   insulin glargine (BASAGLAR KWIKPEN) 100 UNIT/ML injection pen Inject 84 Units into the skin nightly  Historical Provider, MD   simvastatin (ZOCOR) 80 MG tablet Take 1 tablet by mouth once daily  Addis Thacker MD   Dulaglutide (TRULICITY) 8.05 SC/3.5GJ SOPN Inject 0.75 mg into the skin once a week  Patient taking differently: Inject 0.75 mg into the skin once a week Patient is on week 3 as of 2/23/21  Addis Thacker MD   meloxicam (MOBIC) 15 MG tablet Take 1 tablet by mouth once daily  Addis Thacker MD   metFORMIN (GLUCOPHAGE) 1000 MG tablet TAKE ONE TABLET BY MOUTH TWICE DAILY WITH FOOD  Addis Thacker MD   NOVOLIN R 100 UNIT/ML injection Sliding scale,  Use as directed - up to 150 units daily  Addis Thacker MD   glucose blood VI test strips (FREESTYLE LITE) strip 1 each by In Vitro route 3 times daily As needed.   Addis Thacker MD         Past Medical History:   Diagnosis Date    Acquired hypothyroidism     Diabetes ([address]     Episode of recurrent major depressive disorder ([address] 1/10/2021    Generalized osteoarthritis 3/21/2018    Hypothyroidism     Morbid obesity due to excess calories ([address] 6/24/2018    Movement disorder     joint pain    Type 2 diabetes mellitus without complication, with long-term current use of insulin ([facility])        Past Surgical History:   Procedure Laterality Date    CHOLECYSTECTOMY      TUBAL LIGATION           Family History   Problem Relation Age of Onset    Diabetes Mother     Bipolar Disorder Mother        CareTeam (Including outside providers/suppliers regularly involved in providing care):   Patient Care Team:  Addis Thacker MD as PCP - General (Internal Medicine)  Addis Thacker MD as PCP - [facility] Empaneled Provider  Brynn King MD as Consulting Physician (Neurology)    Wt Readings from Last 3 Encounters:   07/19/21 (!) 312 lb (141.5 kg)   05/17/21 (!) 311 lb (141.1 kg)   04/20/21 298 lb (135.2 kg)     Vitals:    07/19/21 1338   BP: 110/70   Pulse: 91   SpO2: 97% Weight: (!) 312 lb (141.5 kg)   Height: 5' 7\" (1.702 m)     Body mass index is 48.87 kg/m². Based upon direct observation of the patient, evaluation of cognition reveals cognition different since head injury in past     Patient's complete Health Risk Assessment and screening values have been reviewed and are found in Flowsheets. The following problems were reviewed today and where indicated follow up appointments were made and/or referrals ordered. Positive Risk Factor Screenings with Interventions:            General Health and ACP:  General  In general, how would you say your health is?: Good  In the past 7 days, have you experienced any of the following?  New or Increased Pain, New or Increased Fatigue, Loneliness, Social Isolation, Stress or Anger?: None of These  Do you get the social and emotional support that you need?: Yes  Advance Directives     Power of [address] [place] [address] Will ACP-Advance Directive ACP-Power of     Not on File Not on File Not on File Not on File      General Health Risk Interventions:  · Family live with her - encourage living will and poa designation     Health Habits/Nutrition:  Health Habits/Nutrition  Do you exercise for at least 20 minutes 2-3 times per week?: (!) No  Have you lost any weight without trying in the past 3 months?: No  Do you eat only one meal per day?: No  Have you seen the dentist within the past year?: (!) No  Body mass index: (!) 48.86  Health Habits/Nutrition Interventions:  · Work on [employer] compliance offered referral again to dietician     Hearing/Vision:  No exam data present  Hearing/Vision  Do you or your family notice any trouble with your hearing that hasn't been managed with hearing aids?: No  Do you have difficulty driving, watching TV, or doing any of your daily activities because of your eyesight?: No  Have you had an eye exam within the past year?: (!) No  Hearing/Vision Interventions:  · Keep up to date with dm eye exam       Personalized Preventive Plan   Current Health Maintenance Status  Immunization History   Administered Date(s) Administered    COVID-19, Moderna, PF, 100mcg/0.5mL 07/16/2021    Tdap (Boostrix, Adacel) 03/01/2015        Health Maintenance   Topic Date Due    Pneumococcal 0-64 years Vaccine (1 of 2 - PPSV23) Never done    Diabetic retinal exam  Never done    Cervical cancer screen  Never done    Colon cancer screen colonoscopy  Never done    Shingles Vaccine (1 of 2) Never done    Annual Wellness Visit (AWV)  Never done    Diabetic microalbuminuria test  03/01/2020    Breast cancer screen  02/18/2021    COVID-19 Vaccine (2 - Moderna 2-dose series) 08/13/2021    Flu vaccine (1) 09/01/2021    A1C test (Diabetic or Prediabetic)  10/16/2021    Diabetic foot exam  05/17/2022    Lipid screen  07/16/2022    TSH testing  07/16/2022    Potassium monitoring  07/16/2022    Creatinine monitoring  07/16/2022    DTaP/Tdap/Td vaccine (2 - Td or Tdap) 03/01/2025    Hepatitis C screen  Completed    HIV screen  Completed    Hepatitis A vaccine  Aged Out    Hib vaccine  Aged Out    Meningococcal (ACWY) vaccine  Aged Out     Recommendations for [place] Due: see orders and patient instructions/AVS.  . Recommended screening schedule for the next 5-10 years is provided to the patient in written form: see Patient Nkechi Sotomayor was seen today for medicare awv and diabetes. Diagnoses and all orders for this visit:    Medicare annual wellness visit, subsequent    Uncontrolled type 2 diabetes mellitus with hyperglycemia ([facility] [address]  -     External Referral To Ophthalmology  -     CBC; Future  -     Comprehensive Metabolic Panel; Future  -     TSH without Reflex; Future    Screening mammogram, encounter for  -     CHRISTIANO DIGITAL SCREEN W OR WO CAD BILATERAL; Future    Cognitive dysfunction    Essential hypertension    Acquired hypothyroidism  -     levothyroxine (SYNTHROID) 200 MCG tablet;  Take 1 pill daily except

## 2021-08-08 NOTE — PATIENT INSTRUCTIONS
Personalized Preventive Plan for Jackeline Harrell - 7/19/2021  Medicare offers a range of preventive health benefits. Some of the tests and screenings are paid in full while other may be subject to a deductible, co-insurance, and/or copay. Some of these benefits include a comprehensive review of your medical history including lifestyle, illnesses that may run in your family, and various assessments and screenings as appropriate. After reviewing your medical record and screening and assessments performed today your provider may have ordered immunizations, labs, imaging, and/or referrals for you. A list of these orders (if applicable) as well as your Preventive Care list are included within your After Visit Summary for your review. Other Preventive Recommendations:    · A preventive eye exam performed by an eye specialist is recommended every 1-2 years to screen for glaucoma; cataracts, macular degeneration, and other eye disorders. · A preventive dental visit is recommended every 6 months. · Try to get at least 150 minutes of exercise per week or 10,000 steps per day on a pedometer . · Order or download the FREE \"Exercise & Physical Activity: Your Everyday Guide\" from [employer]. Call 5-996.151.3041 or search [employer] online. · You need 7093-2264 mg of calcium and 3314-7163 IU of vitamin D per day. It is possible to meet your calcium requirement with diet alone, but a vitamin D supplement is usually necessary to meet this goal.  · When exposed to the sun, use a sunscreen that protects against both UVA and UVB radiation with an SPF of 30 or greater. Reapply every 2 to 3 hours or after sweating, drying off with a towel, or swimming. · Always wear a seat belt when traveling in a car. Always wear a helmet when riding a bicycle or motorcycle.

## 2021-08-25 DIAGNOSIS — F51.01 PRIMARY INSOMNIA: ICD-10-CM

## 2021-08-26 RX ORDER — ZOLPIDEM TARTRATE 10 MG/1
TABLET ORAL
Qty: 30 TABLET | Refills: 1 | Status: SHIPPED | OUTPATIENT
Start: 2021-08-26 | End: 2021-11-03

## 2021-10-19 DIAGNOSIS — E11.65 UNCONTROLLED TYPE 2 DIABETES MELLITUS WITH HYPERGLYCEMIA (HCC): ICD-10-CM

## 2021-10-19 LAB
ALBUMIN SERPL-MCNC: 4.1 G/DL (ref 3.5–5.2)
ALP BLD-CCNC: 106 U/L (ref 35–104)
ALT SERPL-CCNC: 23 U/L (ref 5–33)
ANION GAP SERPL CALCULATED.3IONS-SCNC: 10 MMOL/L (ref 7–19)
AST SERPL-CCNC: 22 U/L (ref 5–32)
BILIRUB SERPL-MCNC: 0.3 MG/DL (ref 0.2–1.2)
BUN BLDV-MCNC: 15 MG/DL (ref 8–23)
CALCIUM SERPL-MCNC: 9 MG/DL (ref 8.8–10.2)
CHLORIDE BLD-SCNC: 97 MMOL/L (ref 98–111)
CO2: 31 MMOL/L (ref 22–29)
CREAT SERPL-MCNC: 0.5 MG/DL (ref 0.5–0.9)
CREATININE URINE: 108.7 MG/DL (ref 4.2–622)
GFR AFRICAN AMERICAN: >59
GFR NON-AFRICAN AMERICAN: >60
GLUCOSE BLD-MCNC: 319 MG/DL (ref 74–109)
HCT VFR BLD CALC: 45.6 % (ref 37–47)
HEMOGLOBIN: 14.7 G/DL (ref 12–16)
MCH RBC QN AUTO: 30.9 PG (ref 27–31)
MCHC RBC AUTO-ENTMCNC: 32.2 G/DL (ref 33–37)
MCV RBC AUTO: 95.8 FL (ref 81–99)
MICROALBUMIN UR-MCNC: <1.2 MG/DL (ref 0–19)
MICROALBUMIN/CREAT UR-RTO: NORMAL MG/G
PDW BLD-RTO: 13 % (ref 11.5–14.5)
PLATELET # BLD: 311 K/UL (ref 130–400)
PMV BLD AUTO: 9.9 FL (ref 9.4–12.3)
POTASSIUM SERPL-SCNC: 3.9 MMOL/L (ref 3.5–5)
RBC # BLD: 4.76 M/UL (ref 4.2–5.4)
SODIUM BLD-SCNC: 138 MMOL/L (ref 136–145)
TOTAL PROTEIN: 7.4 G/DL (ref 6.6–8.7)
TSH SERPL DL<=0.05 MIU/L-ACNC: 2.18 UIU/ML (ref 0.27–4.2)
WBC # BLD: 8.2 K/UL (ref 4.8–10.8)

## 2021-10-20 DIAGNOSIS — E11.65 UNCONTROLLED TYPE 2 DIABETES MELLITUS WITH HYPERGLYCEMIA (HCC): ICD-10-CM

## 2021-10-20 DIAGNOSIS — E78.2 MIXED HYPERLIPIDEMIA: ICD-10-CM

## 2021-10-20 DIAGNOSIS — E78.2 MIXED HYPERLIPIDEMIA: Primary | ICD-10-CM

## 2021-10-20 LAB — HBA1C MFR BLD: 9.6 % (ref 4–6)

## 2021-10-21 ENCOUNTER — OFFICE VISIT (OUTPATIENT)
Dept: INTERNAL MEDICINE | Age: 61
End: 2021-10-21
Payer: MEDICARE

## 2021-10-21 VITALS
HEIGHT: 67 IN | WEIGHT: 293 LBS | BODY MASS INDEX: 45.99 KG/M2 | DIASTOLIC BLOOD PRESSURE: 80 MMHG | SYSTOLIC BLOOD PRESSURE: 122 MMHG | HEART RATE: 114 BPM | OXYGEN SATURATION: 96 %

## 2021-10-21 DIAGNOSIS — E11.65 UNCONTROLLED TYPE 2 DIABETES MELLITUS WITH HYPERGLYCEMIA (HCC): Primary | ICD-10-CM

## 2021-10-21 DIAGNOSIS — I10 ESSENTIAL HYPERTENSION: ICD-10-CM

## 2021-10-21 DIAGNOSIS — M15.9 GENERALIZED OSTEOARTHRITIS: ICD-10-CM

## 2021-10-21 DIAGNOSIS — E55.9 VITAMIN D DEFICIENCY: ICD-10-CM

## 2021-10-21 DIAGNOSIS — E03.9 ACQUIRED HYPOTHYROIDISM: ICD-10-CM

## 2021-10-21 DIAGNOSIS — E66.01 MORBID OBESITY DUE TO EXCESS CALORIES (HCC): ICD-10-CM

## 2021-10-21 DIAGNOSIS — E78.2 MIXED HYPERLIPIDEMIA: ICD-10-CM

## 2021-10-21 PROCEDURE — G8427 DOCREV CUR MEDS BY ELIG CLIN: HCPCS | Performed by: INTERNAL MEDICINE

## 2021-10-21 PROCEDURE — G8484 FLU IMMUNIZE NO ADMIN: HCPCS | Performed by: INTERNAL MEDICINE

## 2021-10-21 PROCEDURE — 3046F HEMOGLOBIN A1C LEVEL >9.0%: CPT | Performed by: INTERNAL MEDICINE

## 2021-10-21 PROCEDURE — 3017F COLORECTAL CA SCREEN DOC REV: CPT | Performed by: INTERNAL MEDICINE

## 2021-10-21 PROCEDURE — 1036F TOBACCO NON-USER: CPT | Performed by: INTERNAL MEDICINE

## 2021-10-21 PROCEDURE — 2022F DILAT RTA XM EVC RTNOPTHY: CPT | Performed by: INTERNAL MEDICINE

## 2021-10-21 PROCEDURE — 99214 OFFICE O/P EST MOD 30 MIN: CPT | Performed by: INTERNAL MEDICINE

## 2021-10-21 PROCEDURE — G8417 CALC BMI ABV UP PARAM F/U: HCPCS | Performed by: INTERNAL MEDICINE

## 2021-10-21 RX ORDER — LEVOTHYROXINE SODIUM 0.2 MG/1
TABLET ORAL
Qty: 105 TABLET | Refills: 3
Start: 2021-10-21 | End: 2022-03-08

## 2021-10-21 NOTE — PROGRESS NOTES
Chief Complaint   Patient presents with    3 Month Follow-Up     she is only on the insulin that you can get over the counter at [address]    Diabetes       HPI: Pt is here today to follow-up diabetes and other medical problems diabetes is out of control we worked very hard to get this in control patient should have insurance coverage but it does not seem like she does. We have made arrangements for her to get her medications through [facility] [place] but then she says the paperwork did not get done her she never heard from them but this is not the case. Patient finally got Trulicity but she is not taking it because she read the side effects. In summary she does seem better but her diabetes is out of control she has knee pain other arthralgias fatigue insomnia.     Past Medical History:   Diagnosis Date    Acquired hypothyroidism     [facility]     Episode of recurrent major depressive disorder ([facility] 1/10/2021    Generalized osteoarthritis 3/21/2018    Hypothyroidism     Morbid obesity due to excess calories ([facility] [facility] 6/24/2018    Movement disorder     joint pain    Type 2 diabetes mellitus without complication, with long-term current use of insulin ([facility])        Past Surgical History:   Procedure Laterality Date    CHOLECYSTECTOMY      TUBAL LIGATION         Family History   Problem Relation Age of Onset    Diabetes Mother     Bipolar Disorder Mother        Social History     Socioeconomic History    Marital status:      Spouse name: Not on file    Number of children: Not on file    Years of education: Not on file    Highest education level: Not on file   Occupational History    Not on file   Tobacco Use    Smoking status: Never Smoker    Smokeless tobacco: Never Used   Substance and Sexual Activity    Alcohol use: No    Drug use: No    Sexual activity: Yes     Partners: Male   Other Topics Concern    Not on file   Social History Narrative    Not on file     Social Determinants of Health     Financial Resource Strain: Medium Risk    Difficulty of Paying Living Expenses: Somewhat hard   Food Insecurity: No Food Insecurity    Worried About Running Out of Food in the Last Year: Never true    Ran Out of Food in the Last Year: Never true   Transportation Needs:     Lack of Transportation (Medical):  Lack of Transportation (Non-Medical):    Physical Activity:     Days of Exercise per Week:     Minutes of Exercise per Session:    Stress:     Feeling of Stress :    Social Connections:     Frequency of Communication with Friends and Family:     Frequency of Social Gatherings with Friends and Family:     Attends Yazidism Services:     Active Member of Clubs or Organizations:     Attends Club or Organization Meetings:     Marital Status:    Intimate Partner Violence:     Fear of Current or Ex-Partner:     Emotionally Abused:     Physically Abused:     Sexually Abused: Allergies   Allergen Reactions    Compazine [Prochlorperazine Maleate] Anaphylaxis       Current Outpatient Medications   Medication Sig Dispense Refill    levothyroxine (SYNTHROID) 200 MCG tablet Take 1 pill daily except take 2 pills on Sunday and Thursday 105 tablet 3    zolpidem (AMBIEN) 10 MG tablet TAKE 1 TABLET BY MOUTH EVERY NIGHT AS NEEDED FOR SLEEP 30 tablet 1    Continuous Blood Gluc Transmit (DEXCOM G6 TRANSMITTER) MISC Use as directed 3 each 3    Continuous Blood Gluc Sensor (DEXCOM G6 SENSOR) MISC Use as directed 3 each 3    Continuous Blood Gluc  (DEXCOM G6 ) SAFIA Use as directed 3 Device 3    DULoxetine (CYMBALTA) 30 MG extended release capsule Take 1 capsule by mouth once daily 90 capsule 3    simvastatin (ZOCOR) 80 MG tablet Take 1 tablet by mouth once daily 90 tablet 3    meloxicam (MOBIC) 15 MG tablet Take 1 tablet by mouth once daily 90 tablet 3    glucose blood VI test strips (FREESTYLE LITE) strip 1 each by In Vitro route 3 times daily As needed. higher dose than we had writted but level is just right - rewrote for that dose and f/u  - levothyroxine (SYNTHROID) 200 MCG tablet; Take 1 pill daily except take 2 pills on Sunday and Thursday  Dispense: 105 tablet; Refill: 3  - CBC; Future  - TSH without Reflex; Future    3. Mixed hyperlipidemia  Follow and monitor -- watch closely   - Lipid Panel; Future    4. Vitamin D deficiency  Check vit D replace and follow   - Vitamin D 25 Hydroxy; Future    5. Essential hypertension  Stable and follow     6. Morbid obesity due to excess calories ([address]  trulicity would help with weight loss - it would help lower bs and also not risk hypoglycemia     7. Generalized osteoarthritis  D/w her referral to ortho but wants to wait     Chart, medications, labs, vaccines reviewed. Keep up to date with routine care and follow up. Call with any problems or complaints. Keep up to date with routine screening recomendations and vaccines.

## 2021-10-25 DIAGNOSIS — M15.9 GENERALIZED OSTEOARTHRITIS: ICD-10-CM

## 2021-10-26 RX ORDER — MELOXICAM 15 MG/1
TABLET ORAL
Qty: 90 TABLET | Refills: 1 | Status: SHIPPED | OUTPATIENT
Start: 2021-10-26 | End: 2022-08-04

## 2021-10-26 NOTE — TELEPHONE ENCOUNTER
Yimerlene Patel called to request a refill on her medication.       Last office visit : 10/21/2021   Next office visit : 1/24/2022     Requested Prescriptions     Pending Prescriptions Disp Refills    meloxicam (MOBIC) 15 MG tablet [Pharmacy Med Name: Meloxicam 15 MG Oral Tablet] 90 tablet 1     Sig: Take 1 tablet by mouth once daily            Nirmala Roldan MA

## 2022-01-04 DIAGNOSIS — F51.01 PRIMARY INSOMNIA: ICD-10-CM

## 2022-01-04 RX ORDER — ZOLPIDEM TARTRATE 10 MG/1
TABLET ORAL
Qty: 30 TABLET | Refills: 0 | Status: SHIPPED | OUTPATIENT
Start: 2022-01-04 | End: 2022-10-27

## 2022-01-04 NOTE — TELEPHONE ENCOUNTER
Neal Zee called to request a refill on her medication. Last office visit : 10/21/2021   Next office visit : 1/24/2022     Last UDS: No results found for: Marcial Simon, LABBENZ, BUPRENUR, COCAIMETSCRU, GABAPENTIN, MDMA, METAMPU, [address], OXTCOSU, [place], PROPOXYPHENE, THCSCREENUR, Sueellen Hands 10/19/21  Med. Contract 10/21/2021       Last Fill: 11/03/21    Requested Prescriptions     Pending Prescriptions Disp Refills    zolpidem (AMBIEN) 10 MG tablet [Pharmacy Med Name: ZOLPIDEM 10MG TABLETS] 30 tablet      Sig: TAKE 1 TABLET BY MOUTH EVERY NIGHT AS NEEDED FOR SLEEP         Please approve or refuse this medication.    Amy Melo MA

## 2022-01-24 ENCOUNTER — OFFICE VISIT (OUTPATIENT)
Dept: INTERNAL MEDICINE | Age: 62
End: 2022-01-24
Payer: MEDICARE

## 2022-01-24 VITALS
WEIGHT: 293 LBS | HEIGHT: 67 IN | DIASTOLIC BLOOD PRESSURE: 74 MMHG | BODY MASS INDEX: 45.99 KG/M2 | HEART RATE: 104 BPM | OXYGEN SATURATION: 96 % | SYSTOLIC BLOOD PRESSURE: 110 MMHG

## 2022-01-24 DIAGNOSIS — R30.0 DYSURIA: Primary | ICD-10-CM

## 2022-01-24 DIAGNOSIS — E78.2 MIXED HYPERLIPIDEMIA: ICD-10-CM

## 2022-01-24 DIAGNOSIS — E55.9 VITAMIN D DEFICIENCY: ICD-10-CM

## 2022-01-24 DIAGNOSIS — E11.65 UNCONTROLLED TYPE 2 DIABETES MELLITUS WITH HYPERGLYCEMIA (HCC): Primary | ICD-10-CM

## 2022-01-24 DIAGNOSIS — J34.89 RHINORRHEA: ICD-10-CM

## 2022-01-24 DIAGNOSIS — E11.65 UNCONTROLLED TYPE 2 DIABETES MELLITUS WITH HYPERGLYCEMIA (HCC): ICD-10-CM

## 2022-01-24 DIAGNOSIS — E03.9 ACQUIRED HYPOTHYROIDISM: ICD-10-CM

## 2022-01-24 DIAGNOSIS — F33.1 MODERATE EPISODE OF RECURRENT MAJOR DEPRESSIVE DISORDER (HCC): ICD-10-CM

## 2022-01-24 DIAGNOSIS — I10 ESSENTIAL HYPERTENSION: ICD-10-CM

## 2022-01-24 PROBLEM — L98.492 RIGHT GROIN ULCER, WITH FAT LAYER EXPOSED (HCC): Chronic | Status: RESOLVED | Noted: 2021-01-04 | Resolved: 2022-01-24

## 2022-01-24 LAB
ALBUMIN SERPL-MCNC: 4 G/DL (ref 3.5–5.2)
ALP BLD-CCNC: 103 U/L (ref 35–104)
ALT SERPL-CCNC: 32 U/L (ref 5–33)
ANION GAP SERPL CALCULATED.3IONS-SCNC: 13 MMOL/L (ref 7–19)
AST SERPL-CCNC: 43 U/L (ref 5–32)
BILIRUB SERPL-MCNC: 0.3 MG/DL (ref 0.2–1.2)
BUN BLDV-MCNC: 14 MG/DL (ref 8–23)
CALCIUM SERPL-MCNC: 9.1 MG/DL (ref 8.8–10.2)
CHLORIDE BLD-SCNC: 104 MMOL/L (ref 98–111)
CHOLESTEROL, TOTAL: 158 MG/DL (ref 160–199)
CO2: 27 MMOL/L (ref 22–29)
CREAT SERPL-MCNC: 0.6 MG/DL (ref 0.5–0.9)
CREATININE URINE: 66.6 MG/DL (ref 4.2–622)
GFR AFRICAN AMERICAN: >59
GFR NON-AFRICAN AMERICAN: >60
GLUCOSE BLD-MCNC: 122 MG/DL (ref 74–109)
HBA1C MFR BLD: 10.1 % (ref 4–6)
HCT VFR BLD CALC: 45.5 % (ref 37–47)
HDLC SERPL-MCNC: 55 MG/DL (ref 65–121)
HEMOGLOBIN: 14.5 G/DL (ref 12–16)
LDL CHOLESTEROL CALCULATED: 78 MG/DL
MCH RBC QN AUTO: 29.9 PG (ref 27–31)
MCHC RBC AUTO-ENTMCNC: 31.9 G/DL (ref 33–37)
MCV RBC AUTO: 93.8 FL (ref 81–99)
MICROALBUMIN UR-MCNC: <1.2 MG/DL (ref 0–19)
MICROALBUMIN/CREAT UR-RTO: NORMAL MG/G
PDW BLD-RTO: 12.6 % (ref 11.5–14.5)
PLATELET # BLD: 293 K/UL (ref 130–400)
PMV BLD AUTO: 10 FL (ref 9.4–12.3)
POTASSIUM SERPL-SCNC: 4 MMOL/L (ref 3.5–5)
RBC # BLD: 4.85 M/UL (ref 4.2–5.4)
SARS-COV-2, PCR: DETECTED
SODIUM BLD-SCNC: 144 MMOL/L (ref 136–145)
TOTAL PROTEIN: 6.8 G/DL (ref 6.6–8.7)
TRIGL SERPL-MCNC: 127 MG/DL (ref 0–149)
TSH SERPL DL<=0.05 MIU/L-ACNC: 1.53 UIU/ML (ref 0.27–4.2)
VITAMIN D 25-HYDROXY: 22.6 NG/ML
WBC # BLD: 5.4 K/UL (ref 4.8–10.8)

## 2022-01-24 PROCEDURE — 3017F COLORECTAL CA SCREEN DOC REV: CPT | Performed by: INTERNAL MEDICINE

## 2022-01-24 PROCEDURE — G8417 CALC BMI ABV UP PARAM F/U: HCPCS | Performed by: INTERNAL MEDICINE

## 2022-01-24 PROCEDURE — 2022F DILAT RTA XM EVC RTNOPTHY: CPT | Performed by: INTERNAL MEDICINE

## 2022-01-24 PROCEDURE — G8427 DOCREV CUR MEDS BY ELIG CLIN: HCPCS | Performed by: INTERNAL MEDICINE

## 2022-01-24 PROCEDURE — 3046F HEMOGLOBIN A1C LEVEL >9.0%: CPT | Performed by: INTERNAL MEDICINE

## 2022-01-24 PROCEDURE — 99214 OFFICE O/P EST MOD 30 MIN: CPT | Performed by: INTERNAL MEDICINE

## 2022-01-24 PROCEDURE — G8484 FLU IMMUNIZE NO ADMIN: HCPCS | Performed by: INTERNAL MEDICINE

## 2022-01-24 PROCEDURE — 1036F TOBACCO NON-USER: CPT | Performed by: INTERNAL MEDICINE

## 2022-01-24 RX ORDER — DULAGLUTIDE 0.75 MG/.5ML
0.75 INJECTION, SOLUTION SUBCUTANEOUS WEEKLY
COMMUNITY
End: 2022-07-27

## 2022-01-24 RX ORDER — ACETAMINOPHEN 160 MG
1 TABLET,DISINTEGRATING ORAL DAILY
Qty: 90 CAPSULE | Refills: 3 | Status: SHIPPED | OUTPATIENT
Start: 2022-01-24

## 2022-01-24 SDOH — ECONOMIC STABILITY: FOOD INSECURITY: WITHIN THE PAST 12 MONTHS, YOU WORRIED THAT YOUR FOOD WOULD RUN OUT BEFORE YOU GOT MONEY TO BUY MORE.: NEVER TRUE

## 2022-01-24 SDOH — ECONOMIC STABILITY: FOOD INSECURITY: WITHIN THE PAST 12 MONTHS, THE FOOD YOU BOUGHT JUST DIDN'T LAST AND YOU DIDN'T HAVE MONEY TO GET MORE.: NEVER TRUE

## 2022-01-24 ASSESSMENT — SOCIAL DETERMINANTS OF HEALTH (SDOH): HOW HARD IS IT FOR YOU TO PAY FOR THE VERY BASICS LIKE FOOD, HOUSING, MEDICAL CARE, AND HEATING?: SOMEWHAT HARD

## 2022-01-24 NOTE — PROGRESS NOTES
Chief Complaint   Patient presents with    3 Month Follow-Up    Diabetes       HPI: Pt is a today to follow-up diabetes which is been in very poor control talking with her it sounds like she really does not know what insulin she takes she gets NPH and regular over-the-counter without a prescription when she has the short acting NovoLog she takes that but I explained to her we had given her NovoLog to take with Crissie Flatter she does not get the Crissie Flatter she supposed to get it through a patient assistance program but I do not think she does the paperwork to keep these up. This is become very complicated at this point I think if she is going to keep getting NPH and regular were just going to have to work with those. I tried to explain I do not think she really knows or takes insulin on a regimen or even has a very clear sleep-wake routine  Tired. Says she feels ok. Briefly mentioned slight runny nose- - and everyone in her house has covid.      Past Medical History:   Diagnosis Date    Acquired hypothyroidism     [facility] [address]     Episode of recurrent major depressive disorder ([facility] [address] 1/10/2021    Generalized osteoarthritis 3/21/2018    Hypothyroidism     Morbid obesity due to excess calories ([address] 6/24/2018    Movement disorder     joint pain    Type 2 diabetes mellitus without complication, with long-term current use of insulin ([facility])        Past Surgical History:   Procedure Laterality Date    CHOLECYSTECTOMY      TUBAL LIGATION         Family History   Problem Relation Age of Onset    Diabetes Mother     Bipolar Disorder Mother        Social History     Socioeconomic History    Marital status:      Spouse name: Not on file    Number of children: Not on file    Years of education: Not on file    Highest education level: Not on file   Occupational History    Not on file   Tobacco Use    Smoking status: Never Smoker    Smokeless tobacco: Never Used   Substance and Sexual Activity    Alcohol use: No    Drug use: No    Sexual activity: Yes     Partners: Male   Other Topics Concern    Not on file   Social History Narrative    Not on file     Social Determinants of Health     Financial Resource Strain: Medium Risk    Difficulty of Paying Living Expenses: Somewhat hard   Food Insecurity: No Food Insecurity    Worried About Running Out of Food in the Last Year: Never true    Gretchen of Food in the Last Year: Never true   Transportation Needs:     Lack of Transportation (Medical): Not on file    Lack of Transportation (Non-Medical):  Not on file   Physical Activity:     Days of Exercise per Week: Not on file    Minutes of Exercise per Session: Not on file   Stress:     Feeling of Stress : Not on file   Social Connections:     Frequency of Communication with Friends and Family: Not on file    Frequency of Social Gatherings with Friends and Family: Not on file    Attends Restoration Services: Not on file    Active Member of [address] [place] [address] or Organizations: Not on file    Attends Club or Organization Meetings: Not on file    Marital Status: Not on file   Intimate Partner Violence:     Fear of Current or Ex-Partner: Not on file    Emotionally Abused: Not on file    Physically Abused: Not on file    Sexually Abused: Not on file   Housing Stability:     Unable to Pay for Housing in the Last Year: Not on file    Number of [place] in the Last Year: Not on file    Unstable Housing in the Last Year: Not on file       Allergies   Allergen Reactions    Compazine [Prochlorperazine Maleate] Anaphylaxis       Current Outpatient Medications   Medication Sig Dispense Refill    Dulaglutide (TRULICITY) 0.17 VX/6.3MV SOPN Inject 0.75 mg into the skin once a week      Cholecalciferol (VITAMIN D3) 50 MCG (2000 UT) CAPS Take 1 capsule by mouth daily 90 capsule 3    insulin NPH (HUMULIN N) 100 UNIT/ML injection vial Inject 30 Units into the skin 2 times daily (before meals) 10 mL 3    insulin regular (HUMULIN R) 100 UNIT/ML injection 10 units for every 10 above 100 take 3 times a day before meals 10 mL 3    zolpidem (AMBIEN) 10 MG tablet TAKE 1 TABLET BY MOUTH EVERY NIGHT AS NEEDED FOR SLEEP 30 tablet 0    meloxicam (MOBIC) 15 MG tablet Take 1 tablet by mouth once daily 90 tablet 1    levothyroxine (SYNTHROID) 200 MCG tablet Take 1 pill daily except take 2 pills on Sunday and Thursday 105 tablet 3    DULoxetine (CYMBALTA) 30 MG extended release capsule Take 1 capsule by mouth once daily 90 capsule 3    simvastatin (ZOCOR) 80 MG tablet Take 1 tablet by mouth once daily 90 tablet 3     No current facility-administered medications for this visit. Review of Systems    /74   Pulse 104   Ht 5' 7\" (1.702 m)   Wt 293 lb (132.9 kg)   SpO2 96%   BMI 45.89 kg/m²   BP Readings from Last 7 Encounters:   01/24/22 110/74   10/21/21 122/80   07/19/21 110/70   05/17/21 120/74   04/20/21 137/81   02/23/21 120/66   02/23/21 132/87     Wt Readings from Last 7 Encounters:   01/24/22 293 lb (132.9 kg)   10/21/21 (!) 314 lb (142.4 kg)   07/19/21 (!) 312 lb (141.5 kg)   05/17/21 (!) 311 lb (141.1 kg)   04/20/21 298 lb (135.2 kg)   02/23/21 298 lb (135.2 kg)   02/23/21 299 lb (135.6 kg)     BMI Readings from Last 7 Encounters:   01/24/22 45.89 kg/m²   10/21/21 49.18 kg/m²   07/19/21 48.87 kg/m²   05/17/21 48.71 kg/m²   04/20/21 46.67 kg/m²   02/23/21 46.67 kg/m²   02/23/21 46.83 kg/m²     Resp Readings from Last 7 Encounters:   02/04/21 22   01/29/21 20   01/22/21 20   01/14/21 16   01/04/21 20   12/21/20 20   12/20/20 20       Physical Exam  Constitutional:       General: She is not in acute distress. Appearance: She is obese. Eyes:      General: No scleral icterus. Cardiovascular:      Heart sounds: Normal heart sounds. Pulmonary:      Breath sounds: Normal breath sounds. Musculoskeletal:      Cervical back: Neck supple. Lymphadenopathy:      Cervical: No cervical adenopathy. Skin:     Findings: No rash. Results for orders placed or performed in visit on 01/24/22   Microalbumin / Creatinine Urine Ratio   Result Value Ref Range    Microalbumin, Random Urine <1.20 0.00 - 19.00 mg/dL    Creatinine, Ur 66.6 4.2 - 622.0 mg/dL    Microalbumin Creatinine Ratio see below mg/g       ASSESSMENT/ PLAN:  1. Uncontrolled type 2 diabetes mellitus with hyperglycemia ([address]  I told her we would call her back and clarify her insulin which we did we then got a request for NovoLog after we told her not to take it. We have explained to her that NPH is long-acting regular is short acting Basaglar as long acting NovoLog short acting since she keeps buying NPH and regular were not going to give her both of those and NovoLog will just stick with NPH and regular as I cannot get her to under stand or to agree to not take both the regular and the NovoLog. I instructed her to increase her NPH to twice a day and to take a larger amount of regular of course we prefer the newer insulins but we have not been able to get these for the patient we have tried many many things. We have been able to get her Trulicity I would like to increase the dose of that up the right now I dont want to risk   getting the coverage declined so we will make the increases in insulin then reassess  - CBC; Future  - Comprehensive Metabolic Panel; Future  - Hemoglobin A1C; Future    2. Rhinorrhea  We need to check for covid 19  -all of family has covid and she mild sniffles     3. Moderate episode of recurrent major depressive disorder (HCC)  Pt denies depression symptoms but has flat affect ever since head injury years back and just in general.  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 Got to get her on a schedule get her diabetes in control we try to get her in for counseling or antidepressant therapy and she has refused her family have tried to help her with this also    4.  Body mass index (BMI) 45.0-49.9, adult (HCC)  Really needs to work on weight loss     5.  Essential hypertension  Pressure in control    We will call her back with COVID-19 test results and we will call her to clarify insulin    COVID-19 test was positive we did talk to her again about the insulin stick with the NPH in the regular as we had discussed in terms of COVID-19 she says she does not feel that bad she want to watch she was awakened by her family when I called to try to get her the results she denies shortness of breath or low oxygen right now what we are seeing is omicron she is high risk but it is difficult to get approved therapy she is not having many symptoms we will check back closely

## 2022-01-25 ENCOUNTER — TELEPHONE (OUTPATIENT)
Dept: INTERNAL MEDICINE | Age: 62
End: 2022-01-25

## 2022-01-25 DIAGNOSIS — Z79.4 TYPE 2 DIABETES MELLITUS WITHOUT COMPLICATION, WITH LONG-TERM CURRENT USE OF INSULIN (HCC): ICD-10-CM

## 2022-01-25 DIAGNOSIS — E11.9 TYPE 2 DIABETES MELLITUS WITHOUT COMPLICATION, WITH LONG-TERM CURRENT USE OF INSULIN (HCC): ICD-10-CM

## 2022-01-25 RX ORDER — INSULIN ASPART 100 [IU]/ML
INJECTION, SOLUTION INTRAVENOUS; SUBCUTANEOUS
Qty: 15 ML | Refills: 3 | Status: SHIPPED
Start: 2022-01-25 | End: 2022-01-26 | Stop reason: SDUPTHER

## 2022-01-25 NOTE — TELEPHONE ENCOUNTER
Can you call [facility] and tell them not to refill this novolog                                         -- she told me taking NPH and R which you dont have to have a prescription for---- R and NOvolog flexpen are similar--- cancel novolog flexpen  Pt is taking 10 units of R for every 10 over 100 of R and she is going to change the NPH to 30 units twice a day-- I recorded these in her chart  Spoke with her daughter and grandson then I spoke with the patient who is in her bedroom her oxygen sat was 96% I told her she had to keep an eye on that if running less than 94% or feeling worse let me know she said she had some cough syrup already at home and that she did not have too much of a cough.   We will keep checking on her she knows if her saturation is running less than 94% or if she feels at all worse that she needs reassessed to let us know she

## 2022-01-25 NOTE — TELEPHONE ENCOUNTER
----- Message from Anastasiya Miranda sent at 1/25/2022  4:44 PM CST -----  Subject: Message to Provider    QUESTIONS  Information for Provider? pt still does use the gena pen   ---------------------------------------------------------------------------  --------------  CALL BACK INFO  What is the best way for the office to contact you? OK to leave message on   voicemail  Preferred Call Back Phone Number? [phone]  ---------------------------------------------------------------------------  --------------  SCRIPT ANSWERS  Relationship to Patient?  Self

## 2022-01-25 NOTE — TELEPHONE ENCOUNTER
Results of COVID test called to her daughter. She said that she is coughing a lot and needs some cough medicine sent into [facility].

## 2022-01-25 NOTE — TELEPHONE ENCOUNTER
Joellen Bai called to request a refill on her medication.       Last office visit : 1/24/2022   Next office visit : 4/25/2022     Requested Prescriptions     Pending Prescriptions Disp Refills    NOVOLOG FLEXPEN 100 UNIT/ML injection pen [Pharmacy Med Name: Insulin Aspart 100 UNIT/ML Subcutaneous Solution Pen-injector (NovoLOG FlexPen)] 15 mL 3     Sig: Inject 6 units for blood sugar 250-300, 12 units for blood sugar 300-350, 18 units for blood sugar over 350 as directed            Kevin Briones

## 2022-03-08 DIAGNOSIS — E03.9 ACQUIRED HYPOTHYROIDISM: ICD-10-CM

## 2022-03-11 RX ORDER — LEVOTHYROXINE SODIUM 0.2 MG/1
TABLET ORAL
Qty: 90 TABLET | Refills: 1 | Status: SHIPPED | OUTPATIENT
Start: 2022-03-11 | End: 2022-04-18 | Stop reason: SDUPTHER

## 2022-04-18 DIAGNOSIS — E03.9 ACQUIRED HYPOTHYROIDISM: ICD-10-CM

## 2022-04-18 RX ORDER — LEVOTHYROXINE SODIUM 0.2 MG/1
TABLET ORAL
Qty: 90 TABLET | Refills: 1 | Status: SHIPPED | OUTPATIENT
Start: 2022-04-18 | End: 2022-10-20

## 2022-04-25 ENCOUNTER — OFFICE VISIT (OUTPATIENT)
Dept: INTERNAL MEDICINE | Age: 62
End: 2022-04-25
Payer: MEDICARE

## 2022-04-25 VITALS
OXYGEN SATURATION: 96 % | HEART RATE: 115 BPM | BODY MASS INDEX: 45.99 KG/M2 | HEIGHT: 67 IN | DIASTOLIC BLOOD PRESSURE: 70 MMHG | SYSTOLIC BLOOD PRESSURE: 130 MMHG | WEIGHT: 293 LBS

## 2022-04-25 DIAGNOSIS — E78.2 MIXED HYPERLIPIDEMIA: ICD-10-CM

## 2022-04-25 DIAGNOSIS — E11.65 UNCONTROLLED TYPE 2 DIABETES MELLITUS WITH HYPERGLYCEMIA (HCC): Primary | ICD-10-CM

## 2022-04-25 DIAGNOSIS — F09 COGNITIVE DYSFUNCTION: ICD-10-CM

## 2022-04-25 DIAGNOSIS — E11.65 UNCONTROLLED TYPE 2 DIABETES MELLITUS WITH HYPERGLYCEMIA (HCC): ICD-10-CM

## 2022-04-25 DIAGNOSIS — I10 ESSENTIAL HYPERTENSION: ICD-10-CM

## 2022-04-25 DIAGNOSIS — E55.9 VITAMIN D DEFICIENCY: ICD-10-CM

## 2022-04-25 LAB
ALBUMIN SERPL-MCNC: 4.1 G/DL (ref 3.5–5.2)
ALP BLD-CCNC: 103 U/L (ref 35–104)
ALT SERPL-CCNC: 15 U/L (ref 5–33)
ANION GAP SERPL CALCULATED.3IONS-SCNC: 16 MMOL/L (ref 7–19)
AST SERPL-CCNC: 18 U/L (ref 5–32)
BILIRUB SERPL-MCNC: 0.5 MG/DL (ref 0.2–1.2)
BUN BLDV-MCNC: 14 MG/DL (ref 8–23)
CALCIUM SERPL-MCNC: 9.7 MG/DL (ref 8.8–10.2)
CHLORIDE BLD-SCNC: 100 MMOL/L (ref 98–111)
CO2: 26 MMOL/L (ref 22–29)
CREAT SERPL-MCNC: 0.7 MG/DL (ref 0.5–0.9)
GFR AFRICAN AMERICAN: >59
GFR NON-AFRICAN AMERICAN: >60
GLUCOSE BLD-MCNC: 206 MG/DL (ref 74–109)
HBA1C MFR BLD: 9.4 % (ref 4–6)
HCT VFR BLD CALC: 44.8 % (ref 37–47)
HEMOGLOBIN: 14.8 G/DL (ref 12–16)
MCH RBC QN AUTO: 31.1 PG (ref 27–31)
MCHC RBC AUTO-ENTMCNC: 33 G/DL (ref 33–37)
MCV RBC AUTO: 94.1 FL (ref 81–99)
PDW BLD-RTO: 12.9 % (ref 11.5–14.5)
PLATELET # BLD: 311 K/UL (ref 130–400)
PMV BLD AUTO: 10.3 FL (ref 9.4–12.3)
POTASSIUM SERPL-SCNC: 4.3 MMOL/L (ref 3.5–5)
RBC # BLD: 4.76 M/UL (ref 4.2–5.4)
SODIUM BLD-SCNC: 142 MMOL/L (ref 136–145)
TOTAL PROTEIN: 6.5 G/DL (ref 6.6–8.7)
WBC # BLD: 7 K/UL (ref 4.8–10.8)

## 2022-04-25 PROCEDURE — 3046F HEMOGLOBIN A1C LEVEL >9.0%: CPT | Performed by: INTERNAL MEDICINE

## 2022-04-25 PROCEDURE — 3017F COLORECTAL CA SCREEN DOC REV: CPT | Performed by: INTERNAL MEDICINE

## 2022-04-25 PROCEDURE — 1036F TOBACCO NON-USER: CPT | Performed by: INTERNAL MEDICINE

## 2022-04-25 PROCEDURE — G8417 CALC BMI ABV UP PARAM F/U: HCPCS | Performed by: INTERNAL MEDICINE

## 2022-04-25 PROCEDURE — G8427 DOCREV CUR MEDS BY ELIG CLIN: HCPCS | Performed by: INTERNAL MEDICINE

## 2022-04-25 PROCEDURE — 99214 OFFICE O/P EST MOD 30 MIN: CPT | Performed by: INTERNAL MEDICINE

## 2022-04-25 PROCEDURE — 2022F DILAT RTA XM EVC RTNOPTHY: CPT | Performed by: INTERNAL MEDICINE

## 2022-04-25 ASSESSMENT — PATIENT HEALTH QUESTIONNAIRE - PHQ9
SUM OF ALL RESPONSES TO PHQ QUESTIONS 1-9: 2
SUM OF ALL RESPONSES TO PHQ QUESTIONS 1-9: 2
SUM OF ALL RESPONSES TO PHQ9 QUESTIONS 1 & 2: 2
SUM OF ALL RESPONSES TO PHQ QUESTIONS 1-9: 2
SUM OF ALL RESPONSES TO PHQ QUESTIONS 1-9: 2
1. LITTLE INTEREST OR PLEASURE IN DOING THINGS: 1
2. FEELING DOWN, DEPRESSED OR HOPELESS: 1

## 2022-04-25 NOTE — PROGRESS NOTES
Chief Complaint   Patient presents with    3 Month Follow-Up    Diabetes       HPI: Patient is here today to follow-up diabetes is usual she denies complaints she has a flat apathetic affect. She says she is doing okay she denies any recent severe hypoglycemic episode no chest pain no abdominal pain she is obese she is not very mobile. Past Medical History:   Diagnosis Date    Acquired hypothyroidism     Diabetes [facility] [address]     Episode of recurrent major depressive disorder ([facility] [address] 1/10/2021    Generalized osteoarthritis 3/21/2018    Hypothyroidism     Morbid obesity due to excess calories ([facility] [address] 6/24/2018    Movement disorder     joint pain    Type 2 diabetes mellitus without complication, with long-term current use of insulin (HCC)        Past Surgical History:   Procedure Laterality Date    CHOLECYSTECTOMY      TUBAL LIGATION         Family History   Problem Relation Age of Onset    Diabetes Mother     Bipolar Disorder Mother        Social History     Socioeconomic History    Marital status:      Spouse name: Not on file    Number of children: Not on file    Years of education: Not on file    Highest education level: Not on file   Occupational History    Not on file   Tobacco Use    Smoking status: Never Smoker    Smokeless tobacco: Never Used   Substance and Sexual Activity    Alcohol use: No    Drug use: No    Sexual activity: Yes     Partners: Male   Other Topics Concern    Not on file   Social History Narrative    Not on file     Social Determinants of Health     Financial Resource Strain: Medium Risk    Difficulty of Paying Living Expenses: Somewhat hard   Food Insecurity: No Food Insecurity    Worried About Running Out of Food in the Last Year: Never true    Gretchen of Food in the Last Year: Never true   Transportation Needs:     Lack of Transportation (Medical): Not on file    Lack of Transportation (Non-Medical):  Not on file   Physical Activity:     Days of Exercise per Week: Not on file    Minutes of Exercise per Session: Not on file   Stress:     Feeling of Stress : Not on file   Social Connections:     Frequency of Communication with Friends and Family: Not on file    Frequency of Social Gatherings with Friends and Family: Not on file    Attends Jainism Services: Not on file    Active Member of [address] [place] [address] or Organizations: Not on file    Attends Club or Organization Meetings: Not on file    Marital Status: Not on file   Intimate Partner Violence:     Fear of Current or Ex-Partner: Not on file    Emotionally Abused: Not on file    Physically Abused: Not on file    Sexually Abused: Not on file   Housing Stability:     Unable to Pay for Housing in the Last Year: Not on file    Number of [place] in the Last Year: Not on file    Unstable Housing in the Last Year: Not on file       Allergies   Allergen Reactions    Compazine [Prochlorperazine Maleate] Anaphylaxis       Current Outpatient Medications   Medication Sig Dispense Refill    insulin NPH (HUMULIN N) 100 UNIT/ML injection vial 40 units twice daily 10 mL 3    levothyroxine (SYNTHROID) 200 MCG tablet TAKE 1 TABLET BY MOUTH DAILY 90 tablet 1    insulin regular (HUMULIN R) 100 UNIT/ML injection 10 units for every 10 above 100 take 3 times a day before meals 10 mL 3    Dulaglutide (TRULICITY) 3.28 XQ/3.4QQ SOPN Inject 0.75 mg into the skin once a week      Cholecalciferol (VITAMIN D3) 50 MCG (2000 UT) CAPS Take 1 capsule by mouth daily 90 capsule 3    zolpidem (AMBIEN) 10 MG tablet TAKE 1 TABLET BY MOUTH EVERY NIGHT AS NEEDED FOR SLEEP 30 tablet 0    meloxicam (MOBIC) 15 MG tablet Take 1 tablet by mouth once daily 90 tablet 1    DULoxetine (CYMBALTA) 30 MG extended release capsule Take 1 capsule by mouth once daily 90 capsule 3    simvastatin (ZOCOR) 80 MG tablet Take 1 tablet by mouth once daily 90 tablet 3     No current facility-administered medications for this visit.        Review of Systems    BP 130/70   Pulse 115   Ht 5' 7\" (1.702 m)   Wt (!) 309 lb (140.2 kg)   SpO2 96%   BMI 48.40 kg/m²   BP Readings from Last 7 Encounters:   04/25/22 130/70   01/24/22 110/74   10/21/21 122/80   07/19/21 110/70   05/17/21 120/74   04/20/21 137/81   02/23/21 120/66     Wt Readings from Last 7 Encounters:   04/25/22 (!) 309 lb (140.2 kg)   01/24/22 293 lb (132.9 kg)   10/21/21 (!) 314 lb (142.4 kg)   07/19/21 (!) 312 lb (141.5 kg)   05/17/21 (!) 311 lb (141.1 kg)   04/20/21 298 lb (135.2 kg)   02/23/21 298 lb (135.2 kg)     BMI Readings from Last 7 Encounters:   04/25/22 48.40 kg/m²   01/24/22 45.89 kg/m²   10/21/21 49.18 kg/m²   07/19/21 48.87 kg/m²   05/17/21 48.71 kg/m²   04/20/21 46.67 kg/m²   02/23/21 46.67 kg/m²     Resp Readings from Last 7 Encounters:   02/04/21 22   01/29/21 20   01/22/21 20   01/14/21 16   01/04/21 20   12/21/20 20   12/20/20 20       Physical Exam  Constitutional:       General: She is not in acute distress. Appearance: She is well-developed. She is obese. HENT:      Right Ear: External ear normal. Tympanic membrane is not injected. Left Ear: External ear normal. Tympanic membrane is not injected. Mouth/Throat:      Pharynx: No oropharyngeal exudate. Eyes:      General: No scleral icterus. Conjunctiva/sclera: Conjunctivae normal.   Neck:      Thyroid: No thyroid mass or thyromegaly. Vascular: No carotid bruit. Cardiovascular:      Rate and Rhythm: Normal rate and regular rhythm. Heart sounds: S1 normal and S2 normal. No murmur heard. No S3 or S4 sounds. Pulmonary:      Effort: Pulmonary effort is normal. No respiratory distress. Breath sounds: Normal breath sounds. No wheezing or rales. Chest:   Breasts:      Right: No supraclavicular adenopathy. Left: No supraclavicular adenopathy. Abdominal:      General: Bowel sounds are normal. There is no distension. Palpations: Abdomen is soft. There is no mass. Tenderness:  There is no abdominal tenderness. Musculoskeletal:      Cervical back: Neck supple. Lymphadenopathy:      Cervical: No cervical adenopathy. Upper Body:      Right upper body: No supraclavicular adenopathy. Left upper body: No supraclavicular adenopathy. Skin:     Findings: No rash. Neurological:      Mental Status: She is alert and oriented to person, place, and time. Cranial Nerves: No cranial nerve deficit. Psychiatric:      Comments: Baseline flat affect- not very reactive         Results for orders placed or performed in visit on 04/25/22   Hemoglobin A1C   Result Value Ref Range    Hemoglobin A1C 9.4 (H) 4.0 - 6.0 %   Comprehensive Metabolic Panel   Result Value Ref Range    Sodium 142 136 - 145 mmol/L    Potassium 4.3 3.5 - 5.0 mmol/L    Chloride 100 98 - 111 mmol/L    CO2 26 22 - 29 mmol/L    Anion Gap 16 7 - 19 mmol/L    Glucose 206 (H) 74 - 109 mg/dL    BUN 14 8 - 23 mg/dL    CREATININE 0.7 0.5 - 0.9 mg/dL    GFR Non-African American >60 >60    GFR African American >59 >59    Calcium 9.7 8.8 - 10.2 mg/dL    Total Protein 6.5 (L) 6.6 - 8.7 g/dL    Albumin 4.1 3.5 - 5.2 g/dL    Total Bilirubin 0.5 0.2 - 1.2 mg/dL    Alkaline Phosphatase 103 35 - 104 U/L    ALT 15 5 - 33 U/L    AST 18 5 - 32 U/L   CBC   Result Value Ref Range    WBC 7.0 4.8 - 10.8 K/uL    RBC 4.76 4.20 - 5.40 M/uL    Hemoglobin 14.8 12.0 - 16.0 g/dL    Hematocrit 44.8 37.0 - 47.0 %    MCV 94.1 81.0 - 99.0 fL    MCH 31.1 (H) 27.0 - 31.0 pg    MCHC 33.0 33.0 - 37.0 g/dL    RDW 12.9 11.5 - 14.5 %    Platelets 269 963 - 825 K/uL    MPV 10.3 9.4 - 12.3 fL       ASSESSMENT/ PLAN:  1. Uncontrolled type 2 diabetes mellitus with hyperglycemia ([facility] [address]  Terrible diabetes control she would benefit from a Dexcom monitor we will get a try to get that arranged for her. Follow-up closely.   Refer to diabetic educator and I think this patient either comprehends or has concerns about her blood sugar is much as we would like follow-up closely  - External Referral To Diabetic Education  - CBC; Future  - Comprehensive Metabolic Panel; Future  - Hemoglobin A1C; Future  - TSH; Future  - Microalbumin / Creatinine Urine Ratio; Future    2. Cognitive dysfunction  Head injury in the past we have tried to get her assistance with neurology and others we will talk with the family again. Recheck lab next visit might just go ahead and consider adding Aricept. Is a difficult situation  - Vitamin B12; Future  - Folate; Future    3. Essential hypertension  Blood pressure control is adequate    4. Mixed hyperlipidemia  Hyperlipidemia statin therapy follow  - Lipid Panel; Future    5. Vitamin D deficiency    - Vitamin D 25 Hydroxy; Future    Chart, medications, labs, vaccines reviewed. Keep up to date with routine care and follow up. Call with any problems or complaints. Keep up to date with routine screening recomendations and vaccines.

## 2022-05-16 DIAGNOSIS — Z79.4 TYPE 2 DIABETES MELLITUS WITHOUT COMPLICATION, WITH LONG-TERM CURRENT USE OF INSULIN (HCC): ICD-10-CM

## 2022-05-16 DIAGNOSIS — E11.9 TYPE 2 DIABETES MELLITUS WITHOUT COMPLICATION, WITH LONG-TERM CURRENT USE OF INSULIN (HCC): ICD-10-CM

## 2022-05-16 RX ORDER — INSULIN ASPART 100 [IU]/ML
INJECTION, SOLUTION INTRAVENOUS; SUBCUTANEOUS
Qty: 15 ML | Refills: 3 | Status: SHIPPED | OUTPATIENT
Start: 2022-05-16 | End: 2022-07-26

## 2022-06-22 RX ORDER — SIMVASTATIN 80 MG
TABLET ORAL
Qty: 90 TABLET | Refills: 3 | Status: SHIPPED | OUTPATIENT
Start: 2022-06-22 | End: 2022-11-02 | Stop reason: SDUPTHER

## 2022-07-26 ENCOUNTER — OFFICE VISIT (OUTPATIENT)
Dept: INTERNAL MEDICINE | Age: 62
End: 2022-07-26
Payer: MEDICARE

## 2022-07-26 VITALS
SYSTOLIC BLOOD PRESSURE: 120 MMHG | HEART RATE: 110 BPM | DIASTOLIC BLOOD PRESSURE: 76 MMHG | BODY MASS INDEX: 45.99 KG/M2 | HEIGHT: 67 IN | OXYGEN SATURATION: 96 % | WEIGHT: 293 LBS

## 2022-07-26 DIAGNOSIS — E66.01 MORBID OBESITY DUE TO EXCESS CALORIES (HCC): ICD-10-CM

## 2022-07-26 DIAGNOSIS — Z87.820 HISTORY OF CLOSED HEAD INJURY: ICD-10-CM

## 2022-07-26 DIAGNOSIS — E78.2 MIXED HYPERLIPIDEMIA: ICD-10-CM

## 2022-07-26 DIAGNOSIS — E55.9 VITAMIN D DEFICIENCY: ICD-10-CM

## 2022-07-26 DIAGNOSIS — I10 ESSENTIAL HYPERTENSION: ICD-10-CM

## 2022-07-26 DIAGNOSIS — E11.65 UNCONTROLLED TYPE 2 DIABETES MELLITUS WITH HYPERGLYCEMIA (HCC): ICD-10-CM

## 2022-07-26 DIAGNOSIS — F33.1 MODERATE EPISODE OF RECURRENT MAJOR DEPRESSIVE DISORDER (HCC): ICD-10-CM

## 2022-07-26 DIAGNOSIS — E11.65 UNCONTROLLED TYPE 2 DIABETES MELLITUS WITH HYPERGLYCEMIA (HCC): Primary | ICD-10-CM

## 2022-07-26 DIAGNOSIS — F09 COGNITIVE DYSFUNCTION: ICD-10-CM

## 2022-07-26 LAB
ALBUMIN SERPL-MCNC: 4.1 G/DL (ref 3.5–5.2)
ALP BLD-CCNC: 113 U/L (ref 35–104)
ALT SERPL-CCNC: 18 U/L (ref 5–33)
ANION GAP SERPL CALCULATED.3IONS-SCNC: 11 MMOL/L (ref 7–19)
AST SERPL-CCNC: 21 U/L (ref 5–32)
BILIRUB SERPL-MCNC: 0.6 MG/DL (ref 0.2–1.2)
BUN BLDV-MCNC: 14 MG/DL (ref 8–23)
CALCIUM SERPL-MCNC: 9.4 MG/DL (ref 8.8–10.2)
CHLORIDE BLD-SCNC: 101 MMOL/L (ref 98–111)
CHOLESTEROL, TOTAL: 262 MG/DL (ref 160–199)
CO2: 29 MMOL/L (ref 22–29)
CREAT SERPL-MCNC: 0.7 MG/DL (ref 0.5–0.9)
CREATININE URINE: 180.5 MG/DL (ref 4.2–622)
FOLATE: 7.2 NG/ML (ref 4.8–37.3)
GFR AFRICAN AMERICAN: >59
GFR NON-AFRICAN AMERICAN: >60
GLUCOSE BLD-MCNC: 280 MG/DL (ref 74–109)
HBA1C MFR BLD: 9.4 % (ref 4–6)
HCT VFR BLD CALC: 44.4 % (ref 37–47)
HDLC SERPL-MCNC: 58 MG/DL (ref 65–121)
HEMOGLOBIN: 14.8 G/DL (ref 12–16)
LDL CHOLESTEROL CALCULATED: 166 MG/DL
MCH RBC QN AUTO: 30.6 PG (ref 27–31)
MCHC RBC AUTO-ENTMCNC: 33.3 G/DL (ref 33–37)
MCV RBC AUTO: 91.7 FL (ref 81–99)
MICROALBUMIN UR-MCNC: <1.2 MG/DL (ref 0–19)
MICROALBUMIN/CREAT UR-RTO: NORMAL MG/G
PDW BLD-RTO: 13.1 % (ref 11.5–14.5)
PLATELET # BLD: 343 K/UL (ref 130–400)
PMV BLD AUTO: 10.2 FL (ref 9.4–12.3)
POTASSIUM SERPL-SCNC: 3.8 MMOL/L (ref 3.5–5)
RBC # BLD: 4.84 M/UL (ref 4.2–5.4)
SODIUM BLD-SCNC: 141 MMOL/L (ref 136–145)
TOTAL PROTEIN: 7.1 G/DL (ref 6.6–8.7)
TRIGL SERPL-MCNC: 190 MG/DL (ref 0–149)
TSH SERPL DL<=0.05 MIU/L-ACNC: 3.12 UIU/ML (ref 0.27–4.2)
VITAMIN B-12: 353 PG/ML (ref 211–946)
VITAMIN D 25-HYDROXY: 15.8 NG/ML
WBC # BLD: 7.1 K/UL (ref 4.8–10.8)

## 2022-07-26 PROCEDURE — 2022F DILAT RTA XM EVC RTNOPTHY: CPT | Performed by: INTERNAL MEDICINE

## 2022-07-26 PROCEDURE — 3046F HEMOGLOBIN A1C LEVEL >9.0%: CPT | Performed by: INTERNAL MEDICINE

## 2022-07-26 PROCEDURE — 99213 OFFICE O/P EST LOW 20 MIN: CPT | Performed by: INTERNAL MEDICINE

## 2022-07-26 PROCEDURE — 1036F TOBACCO NON-USER: CPT | Performed by: INTERNAL MEDICINE

## 2022-07-26 PROCEDURE — 3017F COLORECTAL CA SCREEN DOC REV: CPT | Performed by: INTERNAL MEDICINE

## 2022-07-26 PROCEDURE — G8417 CALC BMI ABV UP PARAM F/U: HCPCS | Performed by: INTERNAL MEDICINE

## 2022-07-26 PROCEDURE — G8427 DOCREV CUR MEDS BY ELIG CLIN: HCPCS | Performed by: INTERNAL MEDICINE

## 2022-07-26 NOTE — PROGRESS NOTES
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 Blood sugar not in control whenever we ask her about her meds she says she was not able to get them we have gone over and over diabetes care we have worked to get her on programs to get her meds covered but then she does not follow through with getting them.     Past Medical History:   Diagnosis Date    Acquired hypothyroidism     Diabetes ([facility]     Episode of recurrent major depressive disorder ([facility] [address] 1/10/2021    Generalized osteoarthritis 3/21/2018    Hypothyroidism     Morbid obesity due to excess calories ([facility] [address] 6/24/2018    Movement disorder     joint pain    Type 2 diabetes mellitus without complication, with long-term current use of insulin (HCC)        Past Surgical History:   Procedure Laterality Date    CHOLECYSTECTOMY      TUBAL LIGATION         Family History   Problem Relation Age of Onset    Diabetes Mother     Bipolar Disorder Mother        Social History     Socioeconomic History    Marital status:      Spouse name: Not on file    Number of children: Not on file    Years of education: Not on file    Highest education level: Not on file   Occupational History    Not on file   Tobacco Use    Smoking status: Never    Smokeless tobacco: Never   Substance and Sexual Activity    Alcohol use: No    Drug use: No    Sexual activity: Yes     Partners: Male   Other Topics Concern    Not on file   Social History Narrative    Not on file     Social Determinants of Health     Financial Resource Strain: Medium Risk    Difficulty of Paying Living Expenses: Somewhat hard Food Insecurity: No Food Insecurity    Worried About Running Out of Food in the Last Year: Never true    Ran Out of Food in the Last Year: Never true   Transportation Needs: Not on file   Physical Activity: Not on file   Stress: Not on file   Social Connections: Not on file   Intimate Partner Violence: Not on file   Housing Stability: Not on file       Allergies   Allergen Reactions    Compazine [Prochlorperazine Maleate] Anaphylaxis       Current Outpatient Medications   Medication Sig Dispense Refill    silver sulfADIAZINE (SSD) 1 % cream APPLY  CREAM EXTERNALLY bid 50 g 0    meloxicam (MOBIC) 15 MG tablet Take 1 tablet by mouth once daily 90 tablet 0    vitamin D (ERGOCALCIFEROL) 1.25 MG (91363 UT) CAPS capsule Take 1 capsule by mouth once a week 12 capsule 1    Dulaglutide (TRULICITY) 1.5 CP/2.3PG SOPN Inject 1.5 mg into the skin once a week 12 pen 1    simvastatin (ZOCOR) 80 MG tablet Take 1 tablet by mouth once daily 90 tablet 3    insulin NPH (HUMULIN N) 100 UNIT/ML injection vial 40 units twice daily 10 mL 3    levothyroxine (SYNTHROID) 200 MCG tablet TAKE 1 TABLET BY MOUTH DAILY 90 tablet 1    insulin regular (HUMULIN R) 100 UNIT/ML injection 10 units for every 10 above 100 take 3 times a day before meals 10 mL 3    Cholecalciferol (VITAMIN D3) 50 MCG (2000 UT) CAPS Take 1 capsule by mouth daily 90 capsule 3    zolpidem (AMBIEN) 10 MG tablet TAKE 1 TABLET BY MOUTH EVERY NIGHT AS NEEDED FOR SLEEP 30 tablet 0    DULoxetine (CYMBALTA) 30 MG extended release capsule Take 1 capsule by mouth once daily 90 capsule 3     No current facility-administered medications for this visit.        Review of Systems    /76   Pulse (!) 110   Ht 5' 7\" (1.702 m)   Wt (!) 308 lb (139.7 kg)   SpO2 96%   BMI 48.24 kg/m²   BP Readings from Last 7 Encounters:   07/26/22 120/76   04/25/22 130/70   01/24/22 110/74   10/21/21 122/80   07/19/21 110/70   05/17/21 120/74   04/20/21 137/81     Wt Readings from Last 7 Encounters: 07/26/22 (!) 308 lb (139.7 kg)   04/25/22 (!) 309 lb (140.2 kg)   01/24/22 293 lb (132.9 kg)   10/21/21 (!) 314 lb (142.4 kg)   07/19/21 (!) 312 lb (141.5 kg)   05/17/21 (!) 311 lb (141.1 kg)   04/20/21 298 lb (135.2 kg)     BMI Readings from Last 7 Encounters:   07/26/22 48.24 kg/m²   04/25/22 48.40 kg/m²   01/24/22 45.89 kg/m²   10/21/21 49.18 kg/m²   07/19/21 48.87 kg/m²   05/17/21 48.71 kg/m²   04/20/21 46.67 kg/m²     Resp Readings from Last 7 Encounters:   02/04/21 22   01/29/21 20   01/22/21 20   01/14/21 16   01/04/21 20   12/21/20 20   12/20/20 20       Physical Exam  Constitutional:       General: She is not in acute distress. Eyes:      General: No scleral icterus. Cardiovascular:      Heart sounds: Normal heart sounds. Pulmonary:      Breath sounds: Normal breath sounds. Musculoskeletal:      Cervical back: Neck supple. Lymphadenopathy:      Cervical: No cervical adenopathy. Skin:     Findings: No rash. Psychiatric:         Mood and Affect: Mood is depressed.       Comments: Flat affect       Results for orders placed or performed in visit on 04/25/22   Hemoglobin A1C   Result Value Ref Range    Hemoglobin A1C 9.4 (H) 4.0 - 6.0 %   Comprehensive Metabolic Panel   Result Value Ref Range    Sodium 142 136 - 145 mmol/L    Potassium 4.3 3.5 - 5.0 mmol/L    Chloride 100 98 - 111 mmol/L    CO2 26 22 - 29 mmol/L    Anion Gap 16 7 - 19 mmol/L    Glucose 206 (H) 74 - 109 mg/dL    BUN 14 8 - 23 mg/dL    Creatinine 0.7 0.5 - 0.9 mg/dL    GFR Non-African American >60 >60    GFR African American >59 >59    Calcium 9.7 8.8 - 10.2 mg/dL    Total Protein 6.5 (L) 6.6 - 8.7 g/dL    Albumin 4.1 3.5 - 5.2 g/dL    Total Bilirubin 0.5 0.2 - 1.2 mg/dL    Alkaline Phosphatase 103 35 - 104 U/L    ALT 15 5 - 33 U/L    AST 18 5 - 32 U/L   CBC   Result Value Ref Range    WBC 7.0 4.8 - 10.8 K/uL    RBC 4.76 4.20 - 5.40 M/uL    Hemoglobin 14.8 12.0 - 16.0 g/dL    Hematocrit 44.8 37.0 - 47.0 %    MCV 94.1 81.0 - 99.0 fL MCH 31.1 (H) 27.0 - 31.0 pg    MCHC 33.0 33.0 - 37.0 g/dL    RDW 12.9 11.5 - 14.5 %    Platelets 191 534 - 483 K/uL    MPV 10.3 9.4 - 12.3 fL       ASSESSMENT/ PLAN:  1. Uncontrolled type 2 diabetes mellitus with hyperglycemia ([facility]  Uncontrolled diabetes for years and I worry about her regimen she tends to going get NPH and regular insulin because you do not need a prescription for those. Explained repetitively we have much better insulins but when we prescribe them for her she says she cannot get them covered I am really not sure if this is the case or not. Because she has used 2 types of short acting insulin at once and my concerns with her compliance were just sticking with the NPH and regular because that is what she always goes back to. We also have her on Trulicity but I am not sure she is actually taking it her diabetes is out of control her family are aware also and we have all worked on a plan of care with this  - CBC; Future  - Comprehensive Metabolic Panel; Future  - Hemoglobin A1C; Future  - TSH; Future  - Lipid Panel; Future    2. Morbid obesity due to excess calories (HCC)  GLP-1 would be of most benefit newer insulins would be helpful having more active life and schedule routine would be helpful    3. History of closed head injury  She fell off a ladder in the past had microhemorrhages just has not had the same affect ever since had a little issue prior to that very difficult to sort out    4. Essential hypertension  Good pressure control    5. Moderate episode of recurrent major depressive disorder ([facility]  Continue Cymbalta she takes Ambien for sleep    Chart, medications, labs, vaccines reviewed. Keep up to date with routine care and follow up. Call with any problems or complaints. Keep up to date with routine screening recomendations and vaccines.

## 2022-07-27 RX ORDER — ERGOCALCIFEROL 1.25 MG/1
50000 CAPSULE ORAL WEEKLY
Qty: 12 CAPSULE | Refills: 1 | Status: SHIPPED | OUTPATIENT
Start: 2022-07-27 | End: 2022-10-27

## 2022-07-27 RX ORDER — DULAGLUTIDE 1.5 MG/.5ML
1.5 INJECTION, SOLUTION SUBCUTANEOUS WEEKLY
Qty: 12 PEN | Refills: 1 | Status: SHIPPED | OUTPATIENT
Start: 2022-07-27 | End: 2022-10-27

## 2022-08-04 DIAGNOSIS — M15.9 GENERALIZED OSTEOARTHRITIS: ICD-10-CM

## 2022-08-04 RX ORDER — MELOXICAM 15 MG/1
TABLET ORAL
Qty: 90 TABLET | Refills: 0 | Status: SHIPPED | OUTPATIENT
Start: 2022-08-04

## 2022-08-04 NOTE — TELEPHONE ENCOUNTER
Ricardo Astorga called to request a refill on her medication.       Last office visit : 7/26/2022   Next office visit : 10/27/2022     Requested Prescriptions     Pending Prescriptions Disp Refills    meloxicam (MOBIC) 15 MG tablet [Pharmacy Med Name: Meloxicam 15 MG Oral Tablet] 90 tablet 0     Sig: Take 1 tablet by mouth once daily            Pattie Chen MA

## 2022-09-07 ENCOUNTER — TELEPHONE (OUTPATIENT)
Dept: INTERNAL MEDICINE | Age: 62
End: 2022-09-07

## 2022-09-07 NOTE — TELEPHONE ENCOUNTER
Pt daughter called back and asked that the pt not know that she made the apt for her or that she has told us her concerns. She said maybe we could ask her if she has noticed any changes herself. The daughter said that pt wants daughter to be in the room but she will kick her out if she thinks she brought anything up with out her knowing.

## 2022-09-07 NOTE — TELEPHONE ENCOUNTER
Spoke to [employer] she said that she is losing her memory. She did not remember Amrik son was out of town. This time pt daughter said that they are planning a trip to see the patients dying father. Pts daughter said she was apart of all of the plans to make the trip to see her dad and the next day she had no idea that she was going on a trip and could not understand why they had her stuff packed. Angela said while on the trip she was behaving like a child and it was like she had no clue about what was going on she did not understand why they where out of town and did not know that her dad lived in [place] where he has been for 30 yrs. Angela said the pt gets upset if she knows that they have talked to us about whats going on. She is getting very concerned with the changes in behavior. She said she thinks its time to see the neurologist again to see if anything has changed. I did double book her for tomorrow. I did ask her if this was a sudden onset or not and she said no that it is just getting a lot worse.

## 2022-09-07 NOTE — TELEPHONE ENCOUNTER
Pt daughter called back again and wants to cancel the apt for tomorrow due to her sister is going to take over her care and finances and they both want to be in the office visit to address what is going on. She said that she could not reschedule it now due to they have to see what her sisters schedule will be so she can be here.

## 2022-09-07 NOTE — TELEPHONE ENCOUNTER
Daughter who is on hippa called and said mom has not been acting right? Forgetful, not making sense???  States it is not all the time but is happening more frequently? Mom Shakira Kaia) is currently out of town and not aware she is calling, however the family is getting concerned?   Please call daughter and advise

## 2022-09-12 NOTE — TELEPHONE ENCOUNTER
Pharmacy left a message stating Humulin R isn't covered but Novolin R is covered but it needs a max daily dose written on rx. Please advise if ok to change. Also Novolin N isn't covered and it would be cheaper for pt to get WM brand.

## 2022-09-13 ENCOUNTER — TELEPHONE (OUTPATIENT)
Dept: INTERNAL MEDICINE | Age: 62
End: 2022-09-13

## 2022-09-13 DIAGNOSIS — E11.65 UNCONTROLLED TYPE 2 DIABETES MELLITUS WITH HYPERGLYCEMIA (HCC): Primary | ICD-10-CM

## 2022-09-13 RX ORDER — BLOOD SUGAR DIAGNOSTIC
STRIP MISCELLANEOUS
Qty: 100 EACH | Refills: 3 | Status: SHIPPED | OUTPATIENT
Start: 2022-09-13

## 2022-09-13 RX ORDER — BLOOD SUGAR DIAGNOSTIC
1 STRIP MISCELLANEOUS DAILY
Qty: 100 EACH | Refills: 3 | Status: SHIPPED | OUTPATIENT
Start: 2022-09-13 | End: 2022-09-13 | Stop reason: SDUPTHER

## 2022-09-13 NOTE — TELEPHONE ENCOUNTER
Patient is needing refill for agamatrix presto test strips sent into [facility] on [facility].

## 2022-09-16 ENCOUNTER — TELEPHONE (OUTPATIENT)
Dept: INTERNAL MEDICINE | Age: 62
End: 2022-09-16

## 2022-09-21 ENCOUNTER — TELEPHONE (OUTPATIENT)
Dept: INTERNAL MEDICINE | Age: 62
End: 2022-09-21

## 2022-09-21 NOTE — TELEPHONE ENCOUNTER
Maricarmen Gruber the daughter called this morning. Patient has an upcoming appt on Oct. 27th.  The daughter would like to speak  to you about concerns she has for her mother before the visit in Oct. [phone]

## 2022-09-30 ENCOUNTER — TELEPHONE (OUTPATIENT)
Dept: INTERNAL MEDICINE | Age: 62
End: 2022-09-30

## 2022-10-11 ENCOUNTER — TELEPHONE (OUTPATIENT)
Dept: INTERNAL MEDICINE | Age: 62
End: 2022-10-11

## 2022-10-11 NOTE — TELEPHONE ENCOUNTER
----- Message from Sury Rod sent at 10/10/2022 12:58 PM CDT -----  Subject: Message to Provider    QUESTIONS  Information for Provider? Spoke with patient's daughter Aguilar Sampson, she   is asking for a call back to discuss some concerns that she has regarding   her mother/patient. She is on the HIPPA form. Please return her call to   address, thank you.   ---------------------------------------------------------------------------  --------------  [address]  [phone]; OK to leave message on voicemail  ---------------------------------------------------------------------------  --------------  SCRIPT ANSWERS  Relationship to Patient? Other  Representative Name? Ankita Kaur Emir/ daughter  Is the Representative on the appropriate HIPAA document in Epic?  Yes

## 2022-10-12 NOTE — TELEPHONE ENCOUNTER
We are playing phone tag. I left her a message to call back tomorrow and let one of us forward her concerns to Dr Roney Grey.

## 2022-10-13 NOTE — TELEPHONE ENCOUNTER
Isa Hightower is concerned with her cognitive decline. It comes and goes. Her grandfather was put on Hospice and they had to fly to another state to take care of this and Romina Vega had been up all night and could not remember what they were doing or that they were going to fly and did not remember that grandfather lived in [place] (has lived there for 30 years). When she slept all night after the flight, the next day she was fine and had recall, but also can not remember being on long-acting insulin and diabetes is out of control. Isa Hightower would like to speak with MD on Monday after 4pm and will try to be at upcoming appt.

## 2022-10-19 DIAGNOSIS — E03.9 ACQUIRED HYPOTHYROIDISM: ICD-10-CM

## 2022-10-20 RX ORDER — LEVOTHYROXINE SODIUM 0.2 MG/1
TABLET ORAL
Qty: 90 TABLET | Refills: 1 | Status: SHIPPED | OUTPATIENT
Start: 2022-10-20

## 2022-10-20 NOTE — TELEPHONE ENCOUNTER
I spoke with her- can you please change the contacts to be Andrae Pruett first - Tanna Diaz second and hetal sawyer

## 2022-10-27 ENCOUNTER — OFFICE VISIT (OUTPATIENT)
Dept: INTERNAL MEDICINE | Age: 62
End: 2022-10-27
Payer: MEDICARE

## 2022-10-27 VITALS
HEIGHT: 67 IN | BODY MASS INDEX: 45.67 KG/M2 | HEART RATE: 112 BPM | OXYGEN SATURATION: 96 % | DIASTOLIC BLOOD PRESSURE: 70 MMHG | WEIGHT: 291 LBS | SYSTOLIC BLOOD PRESSURE: 110 MMHG

## 2022-10-27 DIAGNOSIS — Z87.820 HISTORY OF CLOSED HEAD INJURY: ICD-10-CM

## 2022-10-27 DIAGNOSIS — Z79.4 TYPE 2 DIABETES MELLITUS WITHOUT COMPLICATION, WITH LONG-TERM CURRENT USE OF INSULIN (HCC): Primary | ICD-10-CM

## 2022-10-27 DIAGNOSIS — Z23 INFLUENZA VACCINE NEEDED: ICD-10-CM

## 2022-10-27 DIAGNOSIS — E11.9 TYPE 2 DIABETES MELLITUS WITHOUT COMPLICATION, WITH LONG-TERM CURRENT USE OF INSULIN (HCC): Primary | ICD-10-CM

## 2022-10-27 DIAGNOSIS — E11.65 UNCONTROLLED TYPE 2 DIABETES MELLITUS WITH HYPERGLYCEMIA (HCC): ICD-10-CM

## 2022-10-27 DIAGNOSIS — M15.9 GENERALIZED OSTEOARTHRITIS: ICD-10-CM

## 2022-10-27 DIAGNOSIS — E78.2 MIXED HYPERLIPIDEMIA: ICD-10-CM

## 2022-10-27 DIAGNOSIS — E66.01 MORBID OBESITY DUE TO EXCESS CALORIES (HCC): ICD-10-CM

## 2022-10-27 DIAGNOSIS — E03.9 ACQUIRED HYPOTHYROIDISM: ICD-10-CM

## 2022-10-27 DIAGNOSIS — F33.1 MODERATE EPISODE OF RECURRENT MAJOR DEPRESSIVE DISORDER (HCC): ICD-10-CM

## 2022-10-27 DIAGNOSIS — R41.3 MEMORY LOSS: ICD-10-CM

## 2022-10-27 DIAGNOSIS — I10 ESSENTIAL HYPERTENSION: ICD-10-CM

## 2022-10-27 LAB
ALBUMIN SERPL-MCNC: 4.5 G/DL (ref 3.5–5.2)
ALP BLD-CCNC: 117 U/L (ref 35–104)
ALT SERPL-CCNC: 12 U/L (ref 5–33)
ANION GAP SERPL CALCULATED.3IONS-SCNC: 12 MMOL/L (ref 7–19)
AST SERPL-CCNC: 18 U/L (ref 5–32)
BILIRUB SERPL-MCNC: 0.4 MG/DL (ref 0.2–1.2)
BUN BLDV-MCNC: 12 MG/DL (ref 8–23)
CALCIUM SERPL-MCNC: 9.9 MG/DL (ref 8.8–10.2)
CHLORIDE BLD-SCNC: 101 MMOL/L (ref 98–111)
CHOLESTEROL, TOTAL: 259 MG/DL (ref 160–199)
CO2: 27 MMOL/L (ref 22–29)
CREAT SERPL-MCNC: 0.8 MG/DL (ref 0.5–0.9)
GFR SERPL CREATININE-BSD FRML MDRD: >60 ML/MIN/{1.73_M2}
GLUCOSE BLD-MCNC: 54 MG/DL (ref 74–109)
HBA1C MFR BLD: 8.8 % (ref 4–6)
HCT VFR BLD CALC: 50.4 % (ref 37–47)
HDLC SERPL-MCNC: 68 MG/DL (ref 65–121)
HEMOGLOBIN: 16.6 G/DL (ref 12–16)
LDL CHOLESTEROL CALCULATED: 148 MG/DL
MCH RBC QN AUTO: 30.5 PG (ref 27–31)
MCHC RBC AUTO-ENTMCNC: 32.9 G/DL (ref 33–37)
MCV RBC AUTO: 92.6 FL (ref 81–99)
PDW BLD-RTO: 12.6 % (ref 11.5–14.5)
PLATELET # BLD: 404 K/UL (ref 130–400)
PMV BLD AUTO: 9.8 FL (ref 9.4–12.3)
POTASSIUM SERPL-SCNC: 4.2 MMOL/L (ref 3.5–5)
RBC # BLD: 5.44 M/UL (ref 4.2–5.4)
SODIUM BLD-SCNC: 140 MMOL/L (ref 136–145)
TOTAL PROTEIN: 7.6 G/DL (ref 6.6–8.7)
TRIGL SERPL-MCNC: 215 MG/DL (ref 0–149)
TSH SERPL DL<=0.05 MIU/L-ACNC: 16.27 UIU/ML (ref 0.27–4.2)
WBC # BLD: 10 K/UL (ref 4.8–10.8)

## 2022-10-27 PROCEDURE — G0008 ADMIN INFLUENZA VIRUS VAC: HCPCS | Performed by: INTERNAL MEDICINE

## 2022-10-27 PROCEDURE — 90674 CCIIV4 VAC NO PRSV 0.5 ML IM: CPT | Performed by: INTERNAL MEDICINE

## 2022-10-27 PROCEDURE — 3078F DIAST BP <80 MM HG: CPT | Performed by: INTERNAL MEDICINE

## 2022-10-27 PROCEDURE — 3052F HG A1C>EQUAL 8.0%<EQUAL 9.0%: CPT | Performed by: INTERNAL MEDICINE

## 2022-10-27 PROCEDURE — 99214 OFFICE O/P EST MOD 30 MIN: CPT | Performed by: INTERNAL MEDICINE

## 2022-10-27 PROCEDURE — 3074F SYST BP LT 130 MM HG: CPT | Performed by: INTERNAL MEDICINE

## 2022-10-27 NOTE — PROGRESS NOTES
Chief Complaint   Patient presents with    3 Month Follow-Up    Diabetes       HPI: Patient is here today with her daughter for follow-up we had a conference on the phone recently complicated and difficult situation memory loss since head injury atypical affect noncompliance with meds a motivation depression difficult social circumstances living situation. 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   We have had resistance getting her to see psychiatry she has seen neurology in the past.    Past Medical History:   Diagnosis Date    Acquired hypothyroidism     Diabetes ([facility] [address]     Episode of recurrent major depressive disorder ([facility] [address] 1/10/2021    Generalized osteoarthritis 3/21/2018    Hypothyroidism     Morbid obesity due to excess calories ([facility] [address] 6/24/2018    Movement disorder     joint pain    Type 2 diabetes mellitus without complication, with long-term current use of insulin (HCC)        Past Surgical History:   Procedure Laterality Date    CHOLECYSTECTOMY      TUBAL LIGATION         Family History   Problem Relation Age of Onset    Diabetes Mother     Bipolar Disorder Mother        Social History     Socioeconomic History    Marital status:      Spouse name: Not on file    Number of children: Not on file    Years of education: Not on file    Highest education level: Not on file   Occupational History    Not on file   Tobacco Use    Smoking status: Never    Smokeless tobacco: Never   Substance and Sexual Activity    Alcohol use: No    Drug use: No    Sexual activity: Yes     Partners: Male   Other Topics Concern    Not on file   Social History Narrative    Not on file     Social Determinants of Health     Financial Resource Strain: Medium Risk    Difficulty of Paying Living Expenses: Somewhat hard   Food Insecurity: No Food Insecurity    Worried About Running Out of Food in the Last Year: Never true    Ran Out of Food in the Last Year: Never true   Transportation Needs: Not on file   Physical Activity: Not on file   Stress: Not on file   Social Connections: Not on file   Intimate Partner Violence: Not on file   Housing Stability: Not on file       Allergies   Allergen Reactions    Compazine [Prochlorperazine Maleate] Anaphylaxis       Current Outpatient Medications   Medication Sig Dispense Refill    insulin glargine, 1 unit dial, (TOUJEO) 300 UNIT/ML concentrated injection pen Use as directed 3 Adjustable Dose Pre-filled Pen Syringe 0    simvastatin (ZOCOR) 80 MG tablet Take 1 tablet by mouth once daily 90 tablet 3    levothyroxine (SYNTHROID) 200 MCG tablet TAKE 1 TABLET BY MOUTH DAILY 90 tablet 1    blood glucose test strips (AGAMATRIX PRESTO TEST) strip Test blood sugar 3 times daily. Dx diabetes 100 each 3    meloxicam (MOBIC) 15 MG tablet Take 1 tablet by mouth once daily 90 tablet 0    Cholecalciferol (VITAMIN D3) 50 MCG (2000 UT) CAPS Take 1 capsule by mouth daily 90 capsule 3     No current facility-administered medications for this visit.        Review of Systems    /70   Pulse (!) 112   Ht 5' 7\" (1.702 m)   Wt 291 lb (132 kg)   SpO2 96%   BMI 45.58 kg/m²   BP Readings from Last 7 Encounters:   10/27/22 110/70   07/26/22 120/76   04/25/22 130/70   01/24/22 110/74   10/21/21 122/80   07/19/21 110/70   05/17/21 120/74     Wt Readings from Last 7 Encounters:   10/27/22 291 lb (132 kg)   07/26/22 (!) 308 lb (139.7 kg)   04/25/22 (!) 309 lb (140.2 kg)   01/24/22 293 lb (132.9 kg)   10/21/21 (!) 314 lb (142.4 kg)   07/19/21 (!) 312 lb (141.5 kg)   05/17/21 (!) 311 lb (141.1 kg)     BMI Readings from Last 7 Encounters:   10/27/22 45.58 kg/m²   07/26/22 48.24 kg/m²   04/25/22 48.40 kg/m²   01/24/22 45.89 kg/m²   10/21/21 49.18 kg/m²   07/19/21 48.87 kg/m²   05/17/21 48.71 kg/m²     Resp Readings from Last 7 Encounters:   02/04/21 22   01/29/21 20   01/22/21 20   01/14/21 16 01/04/21 20   12/21/20 20 12/20/20 20       Physical Exam  Constitutional:       General: She is not in acute distress. Appearance: She is obese. HENT:      Head: Normocephalic. Eyes:      General: No scleral icterus. Cardiovascular:      Heart sounds: Normal heart sounds. Pulmonary:      Breath sounds: Normal breath sounds. Musculoskeletal:      Cervical back: Neck supple. Right lower leg: Edema present. Left lower leg: Edema present. Comments: Trace edema - oa changes   Lymphadenopathy:      Cervical: No cervical adenopathy. Skin:     Findings: No rash.    Psychiatric:      Comments: Blank affect / depressed       Results for orders placed or performed in visit on 07/26/22   Folate   Result Value Ref Range    Folate 7.2 4.8 - 37.3 ng/mL   Vitamin B12   Result Value Ref Range    Vitamin B-12 353 211 - 946 pg/mL   Vitamin D 25 Hydroxy   Result Value Ref Range    Vit D, 25-Hydroxy 15.8 (L) >=30 ng/mL   Microalbumin / Creatinine Urine Ratio   Result Value Ref Range    Microalbumin, Random Urine <1.20 0.00 - 19.00 mg/dL    Creatinine, Ur 180.5 4.2 - 622.0 mg/dL    Microalbumin Creatinine Ratio see below mg/g   Lipid Panel   Result Value Ref Range    Cholesterol, Total 262 (H) 160 - 199 mg/dL    Triglycerides 190 (H) 0 - 149 mg/dL    HDL 58 (L) 65 - 121 mg/dL    LDL Calculated 166 <100 mg/dL   TSH   Result Value Ref Range    TSH 3.120 0.270 - 4.200 uIU/mL   Hemoglobin A1C   Result Value Ref Range    Hemoglobin A1C 9.4 (H) 4.0 - 6.0 %   Comprehensive Metabolic Panel   Result Value Ref Range    Sodium 141 136 - 145 mmol/L    Potassium 3.8 3.5 - 5.0 mmol/L    Chloride 101 98 - 111 mmol/L    CO2 29 22 - 29 mmol/L    Anion Gap 11 7 - 19 mmol/L    Glucose 280 (H) 74 - 109 mg/dL    BUN 14 8 - 23 mg/dL    Creatinine 0.7 0.5 - 0.9 mg/dL    GFR Non-African American >60 >60    GFR African American >59 >59    Calcium 9.4 8.8 - 10.2 mg/dL    Total Protein 7.1 6.6 - 8.7 g/dL    Albumin 4.1 3.5 - 5.2 g/dL    Total Bilirubin 0.6 0.2 - 1.2 mg/dL    Alkaline Phosphatase 113 (H) 35 - 104 U/L    ALT 18 5 - 33 U/L    AST 21 5 - 32 U/L   CBC   Result Value Ref Range    WBC 7.1 4.8 - 10.8 K/uL    RBC 4.84 4.20 - 5.40 M/uL    Hemoglobin 14.8 12.0 - 16.0 g/dL    Hematocrit 44.4 37.0 - 47.0 %    MCV 91.7 81.0 - 99.0 fL    MCH 30.6 27.0 - 31.0 pg    MCHC 33.3 33.0 - 37.0 g/dL    RDW 13.1 11.5 - 14.5 %    Platelets 006 701 - 516 K/uL    MPV 10.2 9.4 - 12.3 fL       ASSESSMENT/ PLAN:  1. Type 2 diabetes mellitus without complication, with long-term current use of insulin (HCC)  's daughter who is a pharmacist is with her today she understands that we have tried to have this patient on one of the newer longer acting insulins when we had her on these in the past she has done better but she does not have insurance coverage then does not follow through with the drug assistance programs and the patient goes back and buys NPH and regular over-the-counter. I gave him samples of Toujeo today but it looks like [place] covered on the insurance the daughter is going to check on getting her better insurance coverage and we will proceed from there she would also greatly benefit from a GLP-1 we had her on some Trulicity but it does not sound like she is taking it.  - insulin glargine, 1 unit dial, (TOUJEO) 300 UNIT/ML concentrated injection pen; Use as directed  Dispense: 3 Adjustable Dose Pre-filled Pen Syringe; Refill: 0    2. Influenza vaccine needed    - Influenza, FLUCELVAX, (age 10 mo+), IM, Preservative Free, 0.5 mL    3. Essential hypertension  Blood pressure control without medications - follow     4. Acquired hypothyroidism  Was in control but long history of not following a schedule and current medication noncompliance her daughters are working on a plan to help get things more compliant for her and we will reassess    5. Mixed hyperlipidemia  Continue high-dose statin therapy    6.  Generalized osteoarthritis  Continue meloxicam    7. Morbid obesity due to excess calories (HCC)  A GLP-1 would be greatly beneficial we have filled out drug assistance forms the past but patient has not completed very difficult to get the correct medications for the patient her family are going to help her with this  8. Moderate episode of recurrent major depressive disorder ([facility] [address] [address]  History of depression also preceded her head injury head injury was years ago she fell off a ladder got admitted to [facility] with what look like microhemorrhages ever since she is just had worsened affect difficult compliance with medications a motivation denies depression resistant to antidepressants and resistant to referrals to psychiatry and even neurology in the past but agrees to reconsider  - Gilma Valadez MD, Psychiatry, Dilan Silverio MD, Neurology, [place]    9. Memory loss  Right now she is not on an antidepressant she would probably benefit from Aricept or another medication but has complete medication noncompliance to we can get this in order and get better testing and hesitant to add any other meds patient is resistant oftentimes to suggestion of antidepressant she needs neuropsych testing she needs neurology and psychiatry evaluation - Gilma Valadez MD, Psychiatry, Dilan Silverio MD, Neurology, [place]    10. 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   1 daughter is a pharmacist.  We will work with her to try to figure out better medication coverage better medication compliance another daughter is moving home to help be a caregiver  - Gilma Valadez MD, Psychiatry, Ariadne Lundy MD, Neurology, [place]    Chart, medications, labs, vaccines reviewed. Keep up to date with routine care and follow up. Call with any problems or complaints. Keep up to date with routine screening recomendations and vaccines.

## 2022-11-02 RX ORDER — SIMVASTATIN 80 MG
TABLET ORAL
Qty: 90 TABLET | Refills: 3 | Status: SHIPPED | OUTPATIENT
Start: 2022-11-02

## 2022-11-16 ENCOUNTER — TELEPHONE (OUTPATIENT)
Dept: PSYCHIATRY | Age: 62
End: 2022-11-16

## 2022-11-16 ENCOUNTER — TELEPHONE (OUTPATIENT)
Dept: INTERNAL MEDICINE | Age: 62
End: 2022-11-16

## 2022-11-16 DIAGNOSIS — E03.9 ACQUIRED HYPOTHYROIDISM: Primary | ICD-10-CM

## 2022-11-16 DIAGNOSIS — I10 ESSENTIAL HYPERTENSION: ICD-10-CM

## 2022-11-16 NOTE — TELEPHONE ENCOUNTER
Pt called back to schedule appt from a referral for Specialty Services.   Appt is schedule with  for 11/23@ 3 PM.      Electronically signed by Alberto Blank on 11/16/2022 at 4:06 PM

## 2022-11-16 NOTE — TELEPHONE ENCOUNTER
I spoke with Mera and she is taking 60 units of Toujeo. She is ok with the referrals and have her get lab again after Thanksgiving. She has also contacted [employer] [employer] again and hopefully they can get the forms finished.

## 2022-11-16 NOTE — TELEPHONE ENCOUNTER
Please call her daughter Karlos Aguilar-- the other day we had several issues-- I have had trouble finding anyone to do neuropsych test but Dr. Yared Marcano psych at [facility] could see her  and help sort things out and I put a referal to Dr. Radha Dwyer to get a 2nd neuro opinion and he might be able to arrange neuropsych testing  ----- please see if they are still using the toujeo samples we gave her and what dose -and clarify that in med list----- also I want to recheck lab since thyroid etc was off but medication noncompliance- see if could check again week after Thanskgiving and make sure she is taking her meds. She would really benefit from ozempic or trulicitiy or mounjaro-- is there anyway to help her get better insurance coverage so we can use better meds?  --- Clarke Verdin is a pharmacist at [facility])----- I really need to be able to get better meds for her or meds on drug assistnace-- every time we do drug assistance forms through [address] [place] [address] she has not completed her end   I placed referrals and repeat lab orders

## 2022-11-16 NOTE — TELEPHONE ENCOUNTER
Called pt to schedule an appt from a referral    No answer and LVM.     Electronically signed by Erik Landa MA on 11/16/2022 at 3:57 PM

## 2022-11-22 ENCOUNTER — TELEPHONE (OUTPATIENT)
Dept: PSYCHIATRY | Age: 62
End: 2022-11-22

## 2022-11-23 ENCOUNTER — TELEPHONE (OUTPATIENT)
Dept: NEUROLOGY | Age: 62
End: 2022-11-23

## 2022-11-23 ENCOUNTER — OFFICE VISIT (OUTPATIENT)
Dept: PSYCHIATRY | Age: 62
End: 2022-11-23
Payer: MEDICARE

## 2022-11-23 VITALS
HEIGHT: 67 IN | WEIGHT: 292 LBS | BODY MASS INDEX: 45.83 KG/M2 | HEART RATE: 101 BPM | OXYGEN SATURATION: 96 % | SYSTOLIC BLOOD PRESSURE: 117 MMHG | TEMPERATURE: 97.3 F | DIASTOLIC BLOOD PRESSURE: 70 MMHG

## 2022-11-23 DIAGNOSIS — G47.00 INSOMNIA, UNSPECIFIED TYPE: ICD-10-CM

## 2022-11-23 DIAGNOSIS — F41.9 ANXIETY DISORDER, UNSPECIFIED TYPE: ICD-10-CM

## 2022-11-23 DIAGNOSIS — F32.A DEPRESSION, UNSPECIFIED DEPRESSION TYPE: Primary | ICD-10-CM

## 2022-11-23 DIAGNOSIS — R41.89 COGNITIVE IMPAIRMENT: ICD-10-CM

## 2022-11-23 PROCEDURE — 3074F SYST BP LT 130 MM HG: CPT | Performed by: PSYCHIATRY & NEUROLOGY

## 2022-11-23 PROCEDURE — G8428 CUR MEDS NOT DOCUMENT: HCPCS | Performed by: PSYCHIATRY & NEUROLOGY

## 2022-11-23 PROCEDURE — 3078F DIAST BP <80 MM HG: CPT | Performed by: PSYCHIATRY & NEUROLOGY

## 2022-11-23 PROCEDURE — 1036F TOBACCO NON-USER: CPT | Performed by: PSYCHIATRY & NEUROLOGY

## 2022-11-23 PROCEDURE — G8417 CALC BMI ABV UP PARAM F/U: HCPCS | Performed by: PSYCHIATRY & NEUROLOGY

## 2022-11-23 PROCEDURE — 99204 OFFICE O/P NEW MOD 45 MIN: CPT | Performed by: PSYCHIATRY & NEUROLOGY

## 2022-11-23 PROCEDURE — 3017F COLORECTAL CA SCREEN DOC REV: CPT | Performed by: PSYCHIATRY & NEUROLOGY

## 2022-11-23 PROCEDURE — G8482 FLU IMMUNIZE ORDER/ADMIN: HCPCS | Performed by: PSYCHIATRY & NEUROLOGY

## 2022-11-23 RX ORDER — DULOXETIN HYDROCHLORIDE 30 MG/1
30 CAPSULE, DELAYED RELEASE ORAL DAILY
Qty: 30 CAPSULE | Refills: 1 | Status: SHIPPED | OUTPATIENT
Start: 2022-11-23 | End: 2022-12-23

## 2022-11-23 ASSESSMENT — PATIENT HEALTH QUESTIONNAIRE - PHQ9
SUM OF ALL RESPONSES TO PHQ QUESTIONS 1-9: 10
SUM OF ALL RESPONSES TO PHQ9 QUESTIONS 1 & 2: 0
SUM OF ALL RESPONSES TO PHQ QUESTIONS 1-9: 10
1. LITTLE INTEREST OR PLEASURE IN DOING THINGS: 0
3. TROUBLE FALLING OR STAYING ASLEEP: 3
SUM OF ALL RESPONSES TO PHQ QUESTIONS 1-9: 10
6. FEELING BAD ABOUT YOURSELF - OR THAT YOU ARE A FAILURE OR HAVE LET YOURSELF OR YOUR FAMILY DOWN: 0
8. MOVING OR SPEAKING SO SLOWLY THAT OTHER PEOPLE COULD HAVE NOTICED. OR THE OPPOSITE, BEING SO FIGETY OR RESTLESS THAT YOU HAVE BEEN MOVING AROUND A LOT MORE THAN USUAL: 1
SUM OF ALL RESPONSES TO PHQ QUESTIONS 1-9: 10
4. FEELING TIRED OR HAVING LITTLE ENERGY: 3
7. TROUBLE CONCENTRATING ON THINGS, SUCH AS READING THE NEWSPAPER OR WATCHING TELEVISION: 0
5. POOR APPETITE OR OVEREATING: 3
9. THOUGHTS THAT YOU WOULD BE BETTER OFF DEAD, OR OF HURTING YOURSELF: 0
2. FEELING DOWN, DEPRESSED OR HOPELESS: 0
10. IF YOU CHECKED OFF ANY PROBLEMS, HOW DIFFICULT HAVE THESE PROBLEMS MADE IT FOR YOU TO DO YOUR WORK, TAKE CARE OF THINGS AT HOME, OR GET ALONG WITH OTHER PEOPLE: 0

## 2022-11-23 NOTE — PROGRESS NOTES
PSYCHIATRIC EVALUATION    Date of Service:  11/23/2022    Chief Complaint   Patient presents with    Medication Check       HISTORY OF PRESENT ILLNESS  51-year-old white female with history of depression, brain hemorrhage s/p post fall in 2009, diabetes, thyroid dysfunction, presents for initial evaluation. MRI in 2009 showed evidence of microvascular ischemia. She comes with her daughter. Daughter provides most of the history. Ambien listed as her home medication. Patient states she stopped taking it. She is not on any psychotropics currently. Daughter states that patient has been having memory problems since her fall in 2009. 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 She recently has been depressed. She isolates in her room. Withdrawn. She appears confused at times. Daughter reports occasional episodes of lucidity \"when she sleeps and eats well\". Patient lives with her youngest daughter who has mental illness. Patient's  is her power of . They no longer live together. Patient denies depression and anxiety. States she is not sleeping at night because she used to work night shifts. Daughter states she tends to fall asleep in the morning and wakes up at noon. She typically does not leave her house. Patient still manages her medications. Daughter states patient sometimes eats candy to regulate her sugar level without actually checking the level with a glucometer. Patient states she pays her bills however daughter states everything is on autopay. Patient is oriented to person and place. States \"3413\" for the year. Unable to provide the name of the president. Daughter states they have an appointment with neurology coming up. Slums score 17 out of 30 today. Daughter states patient did her best on Cymbalta in the past.  We discussed the need for home health or assisted living facility.   Expressed concern over patient managing her medications and her diabetes. Daughter states the family are trying to make arrangements. Patient at this time is refusing to go to assisted living or nursing home. PSYCHIATRIC HISTORY  Diagnoses: Depression  Suicide attempts/gestures: Denies   Prior hospitalizations: Denies   Medication trials: Cymbalta, Ambien  Mental health contact: Lost to follow-up   Head trauma: Denies     SUBSTANCE USE HISTORY  Denies alcohol and illicit drug use. Denies tobacco use. FAMILY PSYCHIATRIC HISTORY  Mother and daughter with history of bipolar. No history of psychiatric illness or suicide attempts. Her mother had dementia. Social History  Marital status -  Children -3 daughters  Trauma and/or Abuse -denies  Legal -denies  Work History -worked as a nurse  Education -nursing degree    BP: /70   Pulse (!) 101   Temp 97.3 °F (36.3 °C)   Ht 5' 7\" (1.702 m)   Wt 292 lb (132.5 kg)   SpO2 96%   BMI 45.73 kg/m²       negative history of seizures. positive history of head trauma. See past medical history below.       Information obtained via patient and chart review    PCP is  Dominique Barahona MD    Allergies: Compazine [prochlorperazine maleate]      Review of Systems - 14 point review:  Negative except for:     Constitutional: (fevers, chills, night sweats, wt loss/gain, change in appetite, fatigue, somnolence)    HEENT: (ear pain or discharge, hearing loss, ear ringing, sinus pressure, nosebleed, nasal discharge, sore throat, oral sores, tooth pain, bleeding gums, hoarse voice, neck pain)      Cardiovascular: (HTN, chest pain, palpitations, leg swelling, leg pain with walking)    Respiratory: (cough, wheezing, snoring, SOB with activity (dyspnea), SOB while lying flat (orthopnea), awakening with severe SOB (paroxysmal nocturnal dyspnea))    Gastrointestinal: (NVD, constipation, abdominal pain, bright red stools, black tarry stools, stool incontinence)     Genitourinary:  (pelvic pain, burning or frequency of urination, urinary urgency, blood in urine incomplete bladder emptying, urinary incontinence, STD; MEN: testicular pain or swelling, erectile dysfunction; WOMEN: LMP, heavy menstrual bleeding (menorrhagia), irregular periods, postmenopausal bleeding, menstrual pain (dymenorrhea, vaginal discharge)    Musculoskeletal: (bone pain/fracture, joint pain or swelling, musle pain)    Integumentary: (rashes, non-healing sores, itching, breast lumps, breast pain, nipple discharge, hair loss)    Neurologic: (HA, muscle weakness, paresthesias (numbness, coldness, crawling or prickling), memory loss, seizure, dizziness)    Endocrine: (heat or cold intolerance, excessive thirst (polydipsia), excessive hunger (polyphagia))    Immune/Allergic: (hives, seasonal or environmental allergies, HIV exposure)    Hematologic/Lymphatic: (lymph node enlargement, easy bleeding or bruising)      Prior to Admission medications    Medication Sig Start Date End Date Taking? Authorizing Provider   DULoxetine (CYMBALTA) 30 MG extended release capsule Take 1 capsule by mouth daily 11/23/22 12/23/22 Yes Deidre Cárdenas MD   insulin glargine, 1 unit dial, (TOUJEO) 300 UNIT/ML concentrated injection pen Use as directed  Patient taking differently: Inject 60 Units into the skin nightly Use as directed 11/16/22   Beatris Yo MD   simvastatin (ZOCOR) 80 MG tablet Take 1 tablet by mouth once daily 11/2/22   Beatris Yo MD   levothyroxine (SYNTHROID) 200 MCG tablet TAKE 1 TABLET BY MOUTH DAILY 10/20/22   Beatris Yo MD   blood glucose test strips (AGAMATRIX PRESTO TEST) strip Test blood sugar 3 times daily.  Dx diabetes 9/13/22   Beatris Yo MD   insulin NPH (HUMULIN N) 100 UNIT/ML injection vial 40 units twice daily 9/12/22 10/27/22  Beatris Yo MD   insulin regular (HUMULIN R) 100 UNIT/ML injection 10 units for every 10 above 100 take 3 times a day before meals  Patient taking differently: 10 units for every 10 above 100 take 3 times a day before meals 40 UNITS MAX DAILY DOSE 9/12/22 10/27/22  Ron Dukes MD   meloxicam (MOBIC) 15 MG tablet Take 1 tablet by mouth once daily 8/4/22   Ron Dukes MD   vitamin D (ERGOCALCIFEROL) 1.25 MG (95464 UT) CAPS capsule Take 1 capsule by mouth once a week 7/27/22 10/27/22  Ron Dukes MD   NOVOLOG FLEXPEN 100 UNIT/ML injection pen Inject 6 units for blood sugar 250-300, 12 units for blood sugar 300-350, 18 units for blood sugar over 350 as directed 5/16/22 7/26/22  Ron Dukes MD   Cholecalciferol (VITAMIN D3) 50 MCG (2000 UT) CAPS Take 1 capsule by mouth daily 1/24/22   Ron Dukes MD   zolpidem (AMBIEN) 10 MG tablet TAKE 1 TABLET BY MOUTH EVERY NIGHT AS NEEDED FOR SLEEP 1/4/22 10/27/22  Ron Dukes MD   metFORMIN (GLUCOPHAGE) 1000 MG tablet TAKE ONE TABLET BY MOUTH TWICE DAILY WITH FOOD 1/23/19 12/19/19  Ron Dukes MD       Past Medical History:   Diagnosis Date    Acquired hypothyroidism     Diabetes ([address]     Episode of recurrent major depressive disorder ([address] 1/10/2021    Generalized osteoarthritis 3/21/2018    Hypothyroidism     Morbid obesity due to excess calories ([address] 6/24/2018    Movement disorder     joint pain    Type 2 diabetes mellitus without complication, with long-term current use of insulin ([address]        Past Surgical History:   Procedure Laterality Date    CHOLECYSTECTOMY      TUBAL LIGATION           Family History   Problem Relation Age of Onset    Diabetes Mother     Bipolar Disorder Mother          Psychiatric Review of Systems    Mood:  positive for sadness, poor sleep and appetite, poor energy level, social withdrawal    Gladys: negative    (impulsivity, grandiosity, recklessness, excessive energy, decreased need for sleep, increased spending beyond means, hyperverbal, grandiose, racing thoughts, hypersexuality)    Other: negative    (Irritability, lability, anger)    Anxiety:  present, generalized    Panic Disorder symptoms: negative    (Palpitations, racing heart beat, sweating, sense of impending doom, fear of recurrence, shortness of breath)    OCD symptoms:  negative    (checking, cleaning, organizing, rituals, hang-ups, obsessive thoughts, counting, rational vs. Irrational beliefs)    PTSD symptoms:  negative    (nightmares, flashbacks, startle response, avoidance)    Social anxiety symptoms:  negative    Simple phobias: negative    (heights, planes, spiders, etc.)    Psychosis: negative    (hallucinations, auditory, visual, tactile, olfactory)    Paranoia: negative    Delusions:  negative    (TV, radio, thought broadcasting, mind control, referential thinking)    (persecutory delusion - e.g., believing one is being followed and harassed by gangs)    (grandiose delusion - e.g., believing one is a billionaire  who owns casinos around the world)    (erotomanic delusion - e.g., believing a famous  is in love with them)    (somatic delusion - e.g., believing one's sinuses have been infested by worms)    (delusions of reference - e.g., believing dialogue on a TV program is directed specifically towards the patient)    (delusions of control - e.g., believing one's thoughts and movements are controlled by planetary overlords)    Patient's perception: negative    (Spiritual or cultural context of symptoms, reality testing)    ADHD symptoms: negative    (able to focus and concentrate, scattered thoughts, disorganized thoughts)    Eating Disorder symptoms:  negative    (binging, purging, excessive exercising)      MENTAL STATUS EXAMINATION  Appearance: Appropriately groomed. Morbidly obese. Made good eye contact. Gait stable with a cane. No abnormal movements or tremor. Behavior: Calm, cooperative  Speech: Normal in tone, volume, and quality. No slurring, dysarthria or pressured speech noted. Mood: \"Ok\"   Affect: Appears depressed  Thought Process: Appears linear  Thought Content: Denies active suicidal and homicidal ideations.  No overt delusions or paranoia appreciated. Perceptions: Denies auditory or visual hallucinations at present time. Not responding to internal stimuli. Concentration: Intact. Orientation: to person, place, day and month  Language: Intact. Fund of information: Intact. Memory: Recent and remote appear impaired. Impulsivity: Limited. Neurovegitative: Poor appetite and sleep. Insight: Poor. Judgment: Poor. Lab Results   Component Value Date     10/27/2022    K 4.2 10/27/2022     10/27/2022    CO2 27 10/27/2022    BUN 12 10/27/2022    CREATININE 0.8 10/27/2022    GLUCOSE 54 (L) 10/27/2022    CALCIUM 9.9 10/27/2022    PROT 7.6 10/27/2022    LABALBU 4.5 10/27/2022    BILITOT 0.4 10/27/2022    ALKPHOS 117 (H) 10/27/2022    AST 18 10/27/2022    ALT 12 10/27/2022    LABGLOM >60 10/27/2022    GFRAA >59 07/26/2022     Lab Results   Component Value Date/Time     10/27/2022 04:55 PM    K 4.2 10/27/2022 04:55 PM    K 3.9 12/21/2020 03:52 AM     10/27/2022 04:55 PM    CO2 27 10/27/2022 04:55 PM    BUN 12 10/27/2022 04:55 PM    CREATININE 0.8 10/27/2022 04:55 PM    GLUCOSE 54 10/27/2022 04:55 PM    CALCIUM 9.9 10/27/2022 04:55 PM      Lab Results   Component Value Date    CHOL 259 (H) 10/27/2022     Lab Results   Component Value Date    TRIG 215 (H) 10/27/2022     Lab Results   Component Value Date    HDL 68 10/27/2022     Lab Results   Component Value Date    LDLCALC 148 10/27/2022     No results found for: LABVLDL, VLDL  No results found for: [facility]  Lab Results   Component Value Date    LABA1C 8.8 (H) 10/27/2022     No results found for: EAG  Lab Results   Component Value Date    TSH 16.270 (H) 10/27/2022     Lab Results   Component Value Date    VITD25 15.8 (L) 07/26/2022       Assessment:   1. Depression, unspecified depression type    2. Anxiety disorder, unspecified type    3. Insomnia, unspecified type    4.  Cognitive impairment        No evidence of acute suicidality, homicidality or psychosis observed today. Patient is psychiatrically stable. Concern for vascular dementia. Recommend neurology evaluation. Cannot rule out sleep apnea. Consider sleep study. She will benefit from home health. Long-term plans discussed with the family. PLAN  1. Restart Cymbalta for depression. Melatonin for sleep. The risks, benefits, side effects, indications, contraindications, and adverse effects of the medications have been discussed. Yes.  2. The pt has verbalized understanding and has capacity to give informed consent. 3. The [facility] Goes report has been reviewed according to [facility] regulations. 4. Supportive therapy offered. 5. Follow up: Return in about 2 months (around 1/23/2023). 6. The patient has been advised to call with any problems. Instructed to call suicide hotline, 911 or come to the ER if suicidal or symptoms worsen. 7. Controlled substance Treatment Plan: NA  8. The above listed medications have been continued, modifications in meds and other orders/labs as follows:      Orders Placed This Encounter   Medications    DULoxetine (CYMBALTA) 30 MG extended release capsule     Sig: Take 1 capsule by mouth daily     Dispense:  30 capsule     Refill:  1      No orders of the defined types were placed in this encounter. 9. Additional comments: Continue to monitor cognitive function    10. Over 50% of the total visit time of   50  minutes was spent on counseling and/or coordination of care of:          1. Depression, unspecified depression type    2. Anxiety disorder, unspecified type    3. Insomnia, unspecified type    4.  Cognitive impairment        Deidre Cárdenas MD

## 2022-11-23 NOTE — TELEPHONE ENCOUNTER
Patient was referred to Dr. Marjorie Hendrickson by Jennifer Castillo. I called and spoke with patients daughter to see about getting a follow up appointment with Dr. Debbie Mosley since she is established with him. Patients daughter stated that she wanted to try another provider and that Jennifer Castillo had recommended Dr. Marjorie Hendrickson. I told the daughter that I will have to speak with the providers to get this ok 'd and I will be back in touch.

## 2022-11-29 DIAGNOSIS — I10 ESSENTIAL HYPERTENSION: ICD-10-CM

## 2022-11-29 DIAGNOSIS — E03.9 ACQUIRED HYPOTHYROIDISM: ICD-10-CM

## 2022-11-29 LAB
ALBUMIN SERPL-MCNC: 4 G/DL (ref 3.5–5.2)
ALP BLD-CCNC: 124 U/L (ref 35–104)
ALT SERPL-CCNC: 14 U/L (ref 5–33)
ANION GAP SERPL CALCULATED.3IONS-SCNC: 14 MMOL/L (ref 7–19)
AST SERPL-CCNC: 18 U/L (ref 5–32)
BILIRUB SERPL-MCNC: 0.4 MG/DL (ref 0.2–1.2)
BUN BLDV-MCNC: 14 MG/DL (ref 8–23)
CALCIUM SERPL-MCNC: 9.5 MG/DL (ref 8.8–10.2)
CHLORIDE BLD-SCNC: 101 MMOL/L (ref 98–111)
CO2: 26 MMOL/L (ref 22–29)
CREAT SERPL-MCNC: 0.6 MG/DL (ref 0.5–0.9)
GFR SERPL CREATININE-BSD FRML MDRD: >60 ML/MIN/{1.73_M2}
GLUCOSE BLD-MCNC: 165 MG/DL (ref 74–109)
HCT VFR BLD CALC: 45.6 % (ref 37–47)
HEMOGLOBIN: 15 G/DL (ref 12–16)
MCH RBC QN AUTO: 30.1 PG (ref 27–31)
MCHC RBC AUTO-ENTMCNC: 32.9 G/DL (ref 33–37)
MCV RBC AUTO: 91.4 FL (ref 81–99)
PDW BLD-RTO: 12.4 % (ref 11.5–14.5)
PLATELET # BLD: 363 K/UL (ref 130–400)
PMV BLD AUTO: 9.6 FL (ref 9.4–12.3)
POTASSIUM SERPL-SCNC: 4.1 MMOL/L (ref 3.5–5)
RBC # BLD: 4.99 M/UL (ref 4.2–5.4)
SODIUM BLD-SCNC: 141 MMOL/L (ref 136–145)
TOTAL PROTEIN: 6.7 G/DL (ref 6.6–8.7)
TSH SERPL DL<=0.05 MIU/L-ACNC: 0.09 UIU/ML (ref 0.27–4.2)
WBC # BLD: 11.1 K/UL (ref 4.8–10.8)

## 2022-12-09 ENCOUNTER — OFFICE VISIT (OUTPATIENT)
Dept: INTERNAL MEDICINE | Age: 62
End: 2022-12-09
Payer: MEDICARE

## 2022-12-09 VITALS
OXYGEN SATURATION: 96 % | HEART RATE: 91 BPM | WEIGHT: 293 LBS | HEIGHT: 67 IN | BODY MASS INDEX: 45.99 KG/M2 | DIASTOLIC BLOOD PRESSURE: 78 MMHG | SYSTOLIC BLOOD PRESSURE: 124 MMHG

## 2022-12-09 DIAGNOSIS — I10 ESSENTIAL HYPERTENSION: ICD-10-CM

## 2022-12-09 DIAGNOSIS — F51.01 PRIMARY INSOMNIA: ICD-10-CM

## 2022-12-09 DIAGNOSIS — E03.9 ACQUIRED HYPOTHYROIDISM: ICD-10-CM

## 2022-12-09 DIAGNOSIS — E66.01 MORBID OBESITY DUE TO EXCESS CALORIES (HCC): ICD-10-CM

## 2022-12-09 DIAGNOSIS — E78.2 MIXED HYPERLIPIDEMIA: ICD-10-CM

## 2022-12-09 DIAGNOSIS — M15.9 GENERALIZED OSTEOARTHRITIS: ICD-10-CM

## 2022-12-09 DIAGNOSIS — F33.1 MODERATE EPISODE OF RECURRENT MAJOR DEPRESSIVE DISORDER (HCC): ICD-10-CM

## 2022-12-09 DIAGNOSIS — E11.65 UNCONTROLLED TYPE 2 DIABETES MELLITUS WITH HYPERGLYCEMIA (HCC): Primary | ICD-10-CM

## 2022-12-09 DIAGNOSIS — F09 COGNITIVE DYSFUNCTION: ICD-10-CM

## 2022-12-09 LAB — HBA1C MFR BLD: 0 %

## 2022-12-09 PROCEDURE — 3046F HEMOGLOBIN A1C LEVEL >9.0%: CPT | Performed by: INTERNAL MEDICINE

## 2022-12-09 PROCEDURE — G8427 DOCREV CUR MEDS BY ELIG CLIN: HCPCS | Performed by: INTERNAL MEDICINE

## 2022-12-09 PROCEDURE — 3017F COLORECTAL CA SCREEN DOC REV: CPT | Performed by: INTERNAL MEDICINE

## 2022-12-09 PROCEDURE — 3074F SYST BP LT 130 MM HG: CPT | Performed by: INTERNAL MEDICINE

## 2022-12-09 PROCEDURE — 1036F TOBACCO NON-USER: CPT | Performed by: INTERNAL MEDICINE

## 2022-12-09 PROCEDURE — G8482 FLU IMMUNIZE ORDER/ADMIN: HCPCS | Performed by: INTERNAL MEDICINE

## 2022-12-09 PROCEDURE — 83036 HEMOGLOBIN GLYCOSYLATED A1C: CPT | Performed by: INTERNAL MEDICINE

## 2022-12-09 PROCEDURE — G8417 CALC BMI ABV UP PARAM F/U: HCPCS | Performed by: INTERNAL MEDICINE

## 2022-12-09 PROCEDURE — 99214 OFFICE O/P EST MOD 30 MIN: CPT | Performed by: INTERNAL MEDICINE

## 2022-12-09 PROCEDURE — 2022F DILAT RTA XM EVC RTNOPTHY: CPT | Performed by: INTERNAL MEDICINE

## 2022-12-09 PROCEDURE — 3078F DIAST BP <80 MM HG: CPT | Performed by: INTERNAL MEDICINE

## 2022-12-09 RX ORDER — MELOXICAM 15 MG/1
TABLET ORAL
Qty: 90 TABLET | Refills: 1 | Status: SHIPPED | OUTPATIENT
Start: 2022-12-09

## 2022-12-09 RX ORDER — LEVOTHYROXINE SODIUM 175 UG/1
175 TABLET ORAL DAILY
Qty: 90 TABLET | Refills: 1 | Status: SHIPPED | OUTPATIENT
Start: 2022-12-09 | End: 2022-12-09

## 2022-12-09 RX ORDER — SEMAGLUTIDE 1.34 MG/ML
1 INJECTION, SOLUTION SUBCUTANEOUS
Qty: 3 ML | Refills: 3
Start: 2022-12-09

## 2022-12-09 RX ORDER — LEVOTHYROXINE SODIUM 0.2 MG/1
200 TABLET ORAL DAILY
Qty: 90 TABLET | Refills: 1 | Status: SHIPPED | OUTPATIENT
Start: 2022-12-09

## 2022-12-09 NOTE — PROGRESS NOTES
Chief Complaint   Patient presents with    Follow-up    Diabetes       HPI: Patient is here with her daughter to follow-up diabetes cognitive dysfunction dysthymia she saw Dr. Britney Hernandez who is helped immensely and the patient's daughter moving home and working with her is helping immensely patient's more interactive than I seen her in a long time and in better spirits smiling and laughing some today    Past Medical History:   Diagnosis Date    Acquired hypothyroidism     Diabetes [address]     Episode of recurrent major depressive disorder ([facility] 1/10/2021    Generalized osteoarthritis 3/21/2018    Hypothyroidism     Morbid obesity due to excess calories ([facility] 6/24/2018    Movement disorder     joint pain    Type 2 diabetes mellitus without complication, with long-term current use of insulin ([facility]        Past Surgical History:   Procedure Laterality Date    CHOLECYSTECTOMY      TUBAL LIGATION         Family History   Problem Relation Age of Onset    Diabetes Mother     Bipolar Disorder Mother        Social History     Socioeconomic History    Marital status:      Spouse name: Not on file    Number of children: Not on file    Years of education: Not on file    Highest education level: Not on file   Occupational History    Not on file   Tobacco Use    Smoking status: Never    Smokeless tobacco: Never   Substance and Sexual Activity    Alcohol use: No    Drug use: No    Sexual activity: Yes     Partners: Male   Other Topics Concern    Not on file   Social History Narrative    Not on file     Social Determinants of Health     Financial Resource Strain: Medium Risk    Difficulty of Paying Living Expenses: Somewhat hard   Food Insecurity: No Food Insecurity    Worried About Running Out of Food in the Last Year: Never true    Ran Out of Food in the Last Year: Never true   Transportation Needs: Not on file   Physical Activity: Not on file   Stress: Not on file   Social Connections: Not on file   Intimate Partner Violence: Not on file   Housing Stability: Not on file       Allergies   Allergen Reactions    Compazine [Prochlorperazine Maleate] Anaphylaxis       Current Outpatient Medications   Medication Sig Dispense Refill    meloxicam (MOBIC) 15 MG tablet Take 1 tablet by mouth once daily 90 tablet 1    Semaglutide, 1 MG/DOSE, (OZEMPIC, 1 MG/DOSE,) 4 MG/3ML SOPN Inject 1 mg into the skin every 7 days 3 mL 3    levothyroxine (SYNTHROID) 200 MCG tablet Take 1 tablet by mouth Daily 90 tablet 1    DULoxetine (CYMBALTA) 30 MG extended release capsule Take 1 capsule by mouth daily 30 capsule 1    insulin glargine, 1 unit dial, (TOUJEO) 300 UNIT/ML concentrated injection pen Use as directed (Patient taking differently: Inject 60 Units into the skin nightly Use as directed) 3 Adjustable Dose Pre-filled Pen Syringe 0    simvastatin (ZOCOR) 80 MG tablet Take 1 tablet by mouth once daily 90 tablet 3    blood glucose test strips (AGAMATRIX PRESTO TEST) strip Test blood sugar 3 times daily. Dx diabetes 100 each 3    Melatonin 10 MG TABS Take 1 tablet by mouth nightly      vitamin B-6 (PYRIDOXINE) 50 MG tablet Take 50 mg by mouth daily      Cholecalciferol (VITAMIN D3) 50 MCG (2000 UT) CAPS Take 1 capsule by mouth daily (Patient not taking: No sig reported) 90 capsule 3     No current facility-administered medications for this visit.        Review of Systems    /78 (Site: Left Upper Arm, Position: Sitting, Cuff Size: Large Adult)   Pulse 91   Ht 5' 7\" (1.702 m)   Wt 294 lb (133.4 kg)   SpO2 96%   BMI 46.05 kg/m²   BP Readings from Last 7 Encounters:   12/09/22 124/78   11/23/22 117/70   10/27/22 110/70   07/26/22 120/76   04/25/22 130/70   01/24/22 110/74   10/21/21 122/80     Wt Readings from Last 7 Encounters:   12/09/22 294 lb (133.4 kg)   11/23/22 292 lb (132.5 kg)   10/27/22 291 lb (132 kg)   07/26/22 (!) 308 lb (139.7 kg)   04/25/22 (!) 309 lb (140.2 kg)   01/24/22 293 lb (132.9 kg)   10/21/21 (!) 314 lb (142.4 kg)     BMI Readings from Last 7 Encounters:   12/09/22 46.05 kg/m²   11/23/22 45.73 kg/m²   10/27/22 45.58 kg/m²   07/26/22 48.24 kg/m²   04/25/22 48.40 kg/m²   01/24/22 45.89 kg/m²   10/21/21 49.18 kg/m²     Resp Readings from Last 7 Encounters:   02/04/21 22   01/29/21 20   01/22/21 20   01/14/21 16   01/04/21 20   12/21/20 20   12/20/20 20       Physical Exam  Constitutional:       General: She is not in acute distress. HENT:      Head: Normocephalic. Eyes:      General: No scleral icterus. Cardiovascular:      Heart sounds: Normal heart sounds. Pulmonary:      Breath sounds: Normal breath sounds. Musculoskeletal:      Cervical back: Neck supple. Right lower leg: No edema. Left lower leg: No edema. Lymphadenopathy:      Cervical: No cervical adenopathy. Skin:     Findings: No rash. Psychiatric:         Mood and Affect: Mood normal.       Results for orders placed or performed in visit on 12/09/22   POCT glycosylated hemoglobin (Hb A1C)   Result Value Ref Range    Hemoglobin A1C 0.0 %       ASSESSMENT/ PLAN:  1. Uncontrolled type 2 diabetes mellitus with hyperglycemia ([address]  Continue with Ozempic we will try increasing the dose some concern though as there is a national shortage of Ozempic we may not be able to obtain it we will have to see   - Semaglutide, 1 MG/DOSE, (OZEMPIC, 1 MG/DOSE,) 4 MG/3ML SOPN; Inject 1 mg into the skin every 7 days  Dispense: 3 mL; Refill: 3  - POCT glycosylated hemoglobin (Hb A1C)    2. Generalized osteoarthritis  Ok to resume mobic with caution as did mirifit her   - meloxicam (MOBIC) 15 MG tablet; Take 1 tablet by mouth once daily  Dispense: 90 tablet; Refill: 1    3. Acquired hypothyroidism  Follow and will adjust - I think she has not been taking her meds reliably and now with family help is doing this better - will need to adjust some meds possibly   - levothyroxine (SYNTHROID) 200 MCG tablet; Take 1 tablet by mouth Daily  Dispense: 90 tablet; Refill: 1    4.  Essential hypertension  Good bp control     5. Mixed hyperlipidemia  Zocor 80mg and follow     6. Morbid obesity due to excess calories ([address]  Kristopher Chamber will hopefully help if can keep taking     7. Primary insomnia  Needs get on better routine     8. Cognitive dysfunction  Following with psychiatry also     9.moderate episode of recurrent major depressive disorder- much better today    Chart, medications, labs, vaccines reviewed. Keep up to date with routine care and follow up. Call with any problems or complaints. Keep up to date with routine screening recomendations and vaccines.

## 2022-12-13 ENCOUNTER — CARE COORDINATION (OUTPATIENT)
Dept: CARE COORDINATION | Age: 62
End: 2022-12-13

## 2022-12-13 RX ORDER — PHENOL 1.4 %
1 AEROSOL, SPRAY (ML) MUCOUS MEMBRANE
COMMUNITY
Start: 2022-12-12

## 2022-12-13 RX ORDER — LANOLIN ALCOHOL/MO/W.PET/CERES
50 CREAM (GRAM) TOPICAL DAILY
COMMUNITY
Start: 2022-12-12

## 2022-12-13 NOTE — CARE COORDINATION
Ambulatory Care Nurse contacted the family via telephone to perform admission intake assessment for ambulatory care management. ACM Verified name and  with family as identifiers. Provided introduction to self, and explanation of the ACM role. ACM spoke with patient daughter who was asking if patient should take manegsium supplent for DMII and pain due to neuropathy.       Plan  ACM to complete admission intake   Send senior center contact information  Financial assistance for tujuo and ozemic         [address], [address]

## 2023-01-03 ENCOUNTER — TELEPHONE (OUTPATIENT)
Dept: INTERNAL MEDICINE | Age: 63
End: 2023-01-03

## 2023-01-03 DIAGNOSIS — Z79.4 TYPE 2 DIABETES MELLITUS WITHOUT COMPLICATION, WITH LONG-TERM CURRENT USE OF INSULIN (HCC): ICD-10-CM

## 2023-01-03 DIAGNOSIS — E11.65 UNCONTROLLED TYPE 2 DIABETES MELLITUS WITH HYPERGLYCEMIA (HCC): ICD-10-CM

## 2023-01-03 DIAGNOSIS — E11.9 TYPE 2 DIABETES MELLITUS WITHOUT COMPLICATION, WITH LONG-TERM CURRENT USE OF INSULIN (HCC): ICD-10-CM

## 2023-01-03 RX ORDER — SEMAGLUTIDE 1.34 MG/ML
1 INJECTION, SOLUTION SUBCUTANEOUS
Qty: 3 ML | Refills: 3 | Status: SHIPPED | OUTPATIENT
Start: 2023-01-03

## 2023-01-15 RX ORDER — DULOXETIN HYDROCHLORIDE 30 MG/1
30 CAPSULE, DELAYED RELEASE ORAL DAILY
Qty: 30 CAPSULE | Refills: 1 | Status: CANCELLED | OUTPATIENT
Start: 2023-01-15

## 2023-01-16 RX ORDER — DULOXETIN HYDROCHLORIDE 30 MG/1
30 CAPSULE, DELAYED RELEASE ORAL DAILY
Qty: 30 CAPSULE | Refills: 1 | Status: SHIPPED | OUTPATIENT
Start: 2023-01-16 | End: 2023-02-15

## 2023-01-16 NOTE — TELEPHONE ENCOUNTER
Pharmacy sent a request to refill pt's medication       Last office visit : 11/23/2022 Fabián Dominguez MD  Next office visit : 1/25/2023 Fabián Dominguez MD    Requested Prescriptions     Pending Prescriptions Disp Refills    DULoxetine (CYMBALTA) 30 MG extended release capsule [Pharmacy Med Name: DULOXETINE DR 30MG CAPSULES] 30 capsule 1     Sig: TAKE 1 CAPSULE BY MOUTH DAILY            Mariajose Garcia       Date of Service:  11/23/2022         Chief Complaint   Patient presents with    Medication Check         HISTORY OF PRESENT ILLNESS  77-year-old white female with history of depression, brain hemorrhage s/p post fall in 2009, diabetes, thyroid dysfunction, presents for initial evaluation. MRI in 2009 showed evidence of microvascular ischemia. She comes with her daughter. Daughter provides most of the history. Ambien listed as her home medication. Patient states she stopped taking it. She is not on any psychotropics currently. Daughter states that patient has been having memory problems since her fall in 2009. 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 She recently has been depressed. She isolates in her room. Withdrawn. She appears confused at times. Daughter reports occasional episodes of lucidity \"when she sleeps and eats well\". Patient lives with her youngest daughter who has mental illness. Patient's  is her power of . They no longer live together. Patient denies depression and anxiety. States she is not sleeping at night because she used to work night shifts. Daughter states she tends to fall asleep in the morning and wakes up at noon. She typically does not leave her house. Patient still manages her medications. Daughter states patient sometimes eats candy to regulate her sugar level without actually checking the level with a glucometer. Patient states she pays her bills however daughter states everything is on autopay.   Patient is oriented to person and place. States \"8103\" for the year. Unable to provide the name of the president. Daughter states they have an appointment with neurology coming up. Slums score 17 out of 30 today. Daughter states patient did her best on Cymbalta in the past.  We discussed the need for home health or assisted living facility. Expressed concern over patient managing her medications and her diabetes. Daughter states the family are trying to make arrangements. Patient at this time is refusing to go to assisted living or nursing home. PSYCHIATRIC HISTORY  Diagnoses: Depression  Suicide attempts/gestures: Denies   Prior hospitalizations: Denies   Medication trials: Cymbalta, Ambien  Mental health contact: Lost to follow-up   Head trauma: Denies      SUBSTANCE USE HISTORY  Denies alcohol and illicit drug use. Denies tobacco use. FAMILY PSYCHIATRIC HISTORY  Mother and daughter with history of bipolar. No history of psychiatric illness or suicide attempts. Her mother had dementia. Social History  Marital status -  Children -3 daughters  Trauma and/or Abuse -denies  Legal -denies  Work History -worked as a nurse  Education -nursing degree     BP: /70   Pulse (!) 101   Temp 97.3 °F (36.3 °C)   Ht 5' 7\" (1.702 m)   Wt 292 lb (132.5 kg)   SpO2 96%   BMI 45.73 kg/m²         negative history of seizures. positive history of head trauma. See past medical history below.        Information obtained via patient and chart review     PCP is  Georgian Kussmaul, MD     Allergies:        Compazine [prochlorperazine maleate]        Review of Systems - 14 point review:  Negative except for:      Constitutional: (fevers, chills, night sweats, wt loss/gain, change in appetite, fatigue, somnolence)     HEENT: (ear pain or discharge, hearing loss, ear ringing, sinus pressure, nosebleed, nasal discharge, sore throat, oral sores, tooth pain, bleeding gums, hoarse voice, neck pain)      Cardiovascular: (HTN, chest pain, palpitations, leg swelling, leg pain with walking)     Respiratory: (cough, wheezing, snoring, SOB with activity (dyspnea), SOB while lying flat (orthopnea), awakening with severe SOB (paroxysmal nocturnal dyspnea))     Gastrointestinal: (NVD, constipation, abdominal pain, bright red stools, black tarry stools, stool incontinence)     Genitourinary:  (pelvic pain, burning or frequency of urination, urinary urgency, blood in urine incomplete bladder emptying, urinary incontinence, STD; MEN: testicular pain or swelling, erectile dysfunction; WOMEN: LMP, heavy menstrual bleeding (menorrhagia), irregular periods, postmenopausal bleeding, menstrual pain (dymenorrhea, vaginal discharge)     Musculoskeletal: (bone pain/fracture, joint pain or swelling, musle pain)     Integumentary: (rashes, non-healing sores, itching, breast lumps, breast pain, nipple discharge, hair loss)     Neurologic: (HA, muscle weakness, paresthesias (numbness, coldness, crawling or prickling), memory loss, seizure, dizziness)     Endocrine: (heat or cold intolerance, excessive thirst (polydipsia), excessive hunger (polyphagia))     Immune/Allergic: (hives, seasonal or environmental allergies, HIV exposure)     Hematologic/Lymphatic: (lymph node enlargement, easy bleeding or bruising)        Home Medications           Prior to Admission medications    Medication Sig Start Date End Date Taking?  Authorizing Provider   DULoxetine (CYMBALTA) 30 MG extended release capsule Take 1 capsule by mouth daily 11/23/22 12/23/22 Yes Saundra Moura MD   insulin glargine, 1 unit dial, (TOUJEO) 300 UNIT/ML concentrated injection pen Use as directed  Patient taking differently: Inject 60 Units into the skin nightly Use as directed 11/16/22     Mariana Zamarripa MD   simvastatin (ZOCOR) 80 MG tablet Take 1 tablet by mouth once daily 11/2/22     Mariana Zamarripa MD   levothyroxine (SYNTHROID) 200 MCG tablet TAKE 1 TABLET BY MOUTH DAILY 10/20/22     Mariana Zamarripa MD blood glucose test strips (AGAMATRIX PRESTO TEST) strip Test blood sugar 3 times daily.  Dx diabetes 9/13/22     Georgian Kussmaul, MD   insulin NPH (HUMULIN N) 100 UNIT/ML injection vial 40 units twice daily 9/12/22 10/27/22   Georgian Kussmaul, MD   insulin regular (HUMULIN R) 100 UNIT/ML injection 10 units for every 10 above 100 take 3 times a day before meals  Patient taking differently: 10 units for every 10 above 100 take 3 times a day before meals 40 UNITS MAX DAILY DOSE 9/12/22 10/27/22   Georgian Kussmaul, MD   meloxicam (MOBIC) 15 MG tablet Take 1 tablet by mouth once daily 8/4/22     Georgian Kussmaul, MD   vitamin D (ERGOCALCIFEROL) 1.25 MG (73745 UT) CAPS capsule Take 1 capsule by mouth once a week 7/27/22 10/27/22   Georgian Kussmaul, MD   NOVOLOG FLEXPEN 100 UNIT/ML injection pen Inject 6 units for blood sugar 250-300, 12 units for blood sugar 300-350, 18 units for blood sugar over 350 as directed 5/16/22 7/26/22   Georgian Kussmaul, MD   Cholecalciferol (VITAMIN D3) 50 MCG (2000 UT) CAPS Take 1 capsule by mouth daily 1/24/22     Georgian Kussmaul, MD   zolpidem (AMBIEN) 10 MG tablet TAKE 1 TABLET BY MOUTH EVERY NIGHT AS NEEDED FOR SLEEP 1/4/22 10/27/22   Georgian Kussmaul, MD   metFORMIN (GLUCOPHAGE) 1000 MG tablet TAKE ONE TABLET BY MOUTH TWICE DAILY WITH FOOD 1/23/19 12/19/19   Georgian Kussmaul, MD            Past Medical History        Past Medical History:   Diagnosis Date    Acquired hypothyroidism      Diabetes ([address]      Episode of recurrent major depressive disorder ([address] 1/10/2021    Generalized osteoarthritis 3/21/2018    Hypothyroidism      Morbid obesity due to excess calories ([address] 6/24/2018    Movement disorder       joint pain    Type 2 diabetes mellitus without complication, with long-term current use of insulin ([address]              Past Surgical History         Past Surgical History:   Procedure Laterality Date    CHOLECYSTECTOMY        TUBAL LIGATION                   Family History         Family History   Problem Relation Age of Onset    Diabetes Mother      Bipolar Disorder Mother                 Psychiatric Review of Systems     Mood:  positive for sadness, poor sleep and appetite, poor energy level, social withdrawal     Gladys: negative    (impulsivity, grandiosity, recklessness, excessive energy, decreased need for sleep, increased spending beyond means, hyperverbal, grandiose, racing thoughts, hypersexuality)     Other: negative    (Irritability, lability, anger)     Anxiety:  present, generalized     Panic Disorder symptoms: negative    (Palpitations, racing heart beat, sweating, sense of impending doom, fear of recurrence, shortness of breath)     OCD symptoms:  negative    (checking, cleaning, organizing, rituals, hang-ups, obsessive thoughts, counting, rational vs. Irrational beliefs)     PTSD symptoms:  negative    (nightmares, flashbacks, startle response, avoidance)     Social anxiety symptoms:  negative     Simple phobias: negative    (heights, planes, spiders, etc.)     Psychosis: negative    (hallucinations, auditory, visual, tactile, olfactory)     Paranoia: negative     Delusions:  negative    (TV, radio, thought broadcasting, mind control, referential thinking)    (persecutory delusion - e.g., believing one is being followed and harassed by gangs)    (grandiose delusion - e.g., believing one is a billionaire  who owns casinos around the world)    (erotomanic delusion - e.g., believing a famous  is in love with them)    (somatic delusion - e.g., believing one's sinuses have been infested by worms)    (delusions of reference - e.g., believing dialogue on a TV program is directed specifically towards the patient)    (delusions of control - e.g., believing one's thoughts and movements are controlled by planetary overlords)     Patient's perception: negative    (Spiritual or cultural context of symptoms, reality testing)     ADHD symptoms: negative    (able to focus and concentrate, scattered thoughts, disorganized thoughts)     Eating Disorder symptoms:  negative    (binging, purging, excessive exercising)        MENTAL STATUS EXAMINATION  Appearance: Appropriately groomed. Morbidly obese. Made good eye contact. Gait stable with a cane. No abnormal movements or tremor. Behavior: Calm, cooperative  Speech: Normal in tone, volume, and quality. No slurring, dysarthria or pressured speech noted. Mood: \"Ok\"   Affect: Appears depressed  Thought Process: Appears linear  Thought Content: Denies active suicidal and homicidal ideations. No overt delusions or paranoia appreciated. Perceptions: Denies auditory or visual hallucinations at present time. Not responding to internal stimuli. Concentration: Intact. Orientation: to person, place, day and month  Language: Intact. Fund of information: Intact. Memory: Recent and remote appear impaired. Impulsivity: Limited. Neurovegitative: Poor appetite and sleep. Insight: Poor. Judgment: Poor.               Lab Results   Component Value Date      10/27/2022     K 4.2 10/27/2022      10/27/2022     CO2 27 10/27/2022     BUN 12 10/27/2022     CREATININE 0.8 10/27/2022     GLUCOSE 54 (L) 10/27/2022     CALCIUM 9.9 10/27/2022     PROT 7.6 10/27/2022     LABALBU 4.5 10/27/2022     BILITOT 0.4 10/27/2022     ALKPHOS 117 (H) 10/27/2022     AST 18 10/27/2022     ALT 12 10/27/2022     LABGLOM >60 10/27/2022     GFRAA >59 07/26/2022            Lab Results   Component Value Date/Time      10/27/2022 04:55 PM     K 4.2 10/27/2022 04:55 PM     K 3.9 12/21/2020 03:52 AM      10/27/2022 04:55 PM     CO2 27 10/27/2022 04:55 PM     BUN 12 10/27/2022 04:55 PM     CREATININE 0.8 10/27/2022 04:55 PM     GLUCOSE 54 10/27/2022 04:55 PM     CALCIUM 9.9 10/27/2022 04:55 PM            Lab Results   Component Value Date     CHOL 259 (H) 10/27/2022            Lab Results   Component Value Date     TRIG 215 (H) 10/27/2022            Lab Results   Component Value Date     HDL 68 10/27/2022            Lab Results   Component Value Date     LDLCALC 148 10/27/2022      No results found for: LABVLDL, VLDL  No results found for: [facility]        Lab Results   Component Value Date     LABA1C 8.8 (H) 10/27/2022      No results found for: EAG        Lab Results   Component Value Date     TSH 16.270 (H) 10/27/2022            Lab Results   Component Value Date     VITD25 15.8 (L) 07/26/2022         Assessment:    1. Depression, unspecified depression type    2. Anxiety disorder, unspecified type    3. Insomnia, unspecified type    4. Cognitive impairment          No evidence of acute suicidality, homicidality or psychosis observed today. Patient is psychiatrically stable. Concern for vascular dementia. Recommend neurology evaluation. Cannot rule out sleep apnea. Consider sleep study. She will benefit from home health. Long-term plans discussed with the family. PLAN  1. Restart Cymbalta for depression. Melatonin for sleep. The risks, benefits, side effects, indications, contraindications, and adverse effects of the medications have been discussed. Yes.  2. The pt has verbalized understanding and has capacity to give informed consent. 3. The Dellis Riding report has been reviewed according to [facility] regulations. 4. Supportive therapy offered. 5. Follow up:    Return in about 2 months (around 1/23/2023). 6. The patient has been advised to call with any problems. Instructed to call suicide hotline, 911 or come to the ER if suicidal or symptoms worsen. 7. Controlled substance Treatment Plan: NA  8.  The above listed medications have been continued, modifications in meds and other orders/labs as follows:                 Encounter Medications         Orders Placed This Encounter   Medications    DULoxetine (CYMBALTA) 30 MG extended release capsule       Sig: Take 1 capsule by mouth daily       Dispense:  30 capsule       Refill:  1                     No orders of the defined types were placed in this encounter. 9. Additional comments: Continue to monitor cognitive function     10. Over 50% of the total visit time of   50  minutes was spent on counseling and/or coordination of care of:          1. Depression, unspecified depression type    2. Anxiety disorder, unspecified type    3. Insomnia, unspecified type    4.  Cognitive impairment          Deb Mccauley MD

## 2023-01-19 ENCOUNTER — OFFICE VISIT (OUTPATIENT)
Dept: NEUROLOGY | Age: 63
End: 2023-01-19
Payer: MEDICARE

## 2023-01-19 VITALS
OXYGEN SATURATION: 97 % | HEIGHT: 67 IN | HEART RATE: 66 BPM | SYSTOLIC BLOOD PRESSURE: 116 MMHG | WEIGHT: 293 LBS | DIASTOLIC BLOOD PRESSURE: 68 MMHG | BODY MASS INDEX: 45.99 KG/M2

## 2023-01-19 DIAGNOSIS — E66.01 MORBID OBESITY (HCC): ICD-10-CM

## 2023-01-19 DIAGNOSIS — Z86.39 HISTORY OF DIABETES MELLITUS: ICD-10-CM

## 2023-01-19 DIAGNOSIS — R41.3 MEMORY LOSS: Primary | ICD-10-CM

## 2023-01-19 PROCEDURE — 99215 OFFICE O/P EST HI 40 MIN: CPT | Performed by: PSYCHIATRY & NEUROLOGY

## 2023-01-19 PROCEDURE — 3074F SYST BP LT 130 MM HG: CPT | Performed by: PSYCHIATRY & NEUROLOGY

## 2023-01-19 PROCEDURE — 3078F DIAST BP <80 MM HG: CPT | Performed by: PSYCHIATRY & NEUROLOGY

## 2023-01-19 RX ORDER — INSULIN ASPART 100 [IU]/ML
INJECTION, SOLUTION INTRAVENOUS; SUBCUTANEOUS
COMMUNITY
Start: 2023-01-02

## 2023-01-19 NOTE — PROGRESS NOTES
Chief Complaint   Patient presents with    New Patient     New to provider per family request    Memory Loss       Jackeline Harrell is a 58y.o. year old female who is seen for evaluation of poor memory. She was last seen in our office 4/21 by Dr. Noelle Hubbard. Those records are reviewed. She has had computerized neuropsychological testing (3/21) and MRI. Has a history of intracranial hemorrhage and psychosocial stressors. Felt to have a multifactorial cognitive impairment. Recent slums testing 11/22 was suggestive of dementia. Apparently has longstanding and significant difficulties with anxiety and depression. Sees psychiatry for these. Those records are reviewed as well. She is here today with a couple of her daughters. Patient has difficulty at times knowing the day of the week, when to take her medication and forgets other things. Numerous questions answered today. 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   Active Ambulatory Problems     Diagnosis Date Noted    Acquired hypothyroidism     Essential hypertension     Mixed hyperlipidemia     Generalized osteoarthritis 03/21/2018    Morbid obesity due to excess calories ([address] 06/24/2018    Primary insomnia 11/01/2018    Abscess of right groin 12/20/2020    Noncompliance 01/04/2021    Episode of recurrent major depressive disorder ([address] 01/10/2021    Cognitive dysfunction 01/10/2021    Uncontrolled type 2 diabetes mellitus with hyperglycemia ([address] 01/10/2021    Memory loss 02/23/2021    History of closed head injury 03/10/2021     Resolved Ambulatory Problems     Diagnosis Date Noted    Type 2 diabetes mellitus without complication, with long-term current use of insulin ([address]     Screening mammogram, encounter for 03/21/2018    Medicare annual wellness visit, subsequent 03/10/2019    Right groin ulcer, with fat layer exposed ([address] 01/04/2021     Past Medical History:   Diagnosis Date    Diabetes ([address] Hypothyroidism     Movement disorder        Past Surgical History:   Procedure Laterality Date    CHOLECYSTECTOMY      TUBAL LIGATION         Family History   Problem Relation Age of Onset    Diabetes Mother     Bipolar Disorder Mother        Allergies   Allergen Reactions    Compazine [Prochlorperazine Maleate] Anaphylaxis       Social History     Socioeconomic History    Marital status:      Spouse name: Not on file    Number of children: Not on file    Years of education: Not on file    Highest education level: Not on file   Occupational History    Not on file   Tobacco Use    Smoking status: Never    Smokeless tobacco: Never   Vaping Use    Vaping Use: Never used   Substance and Sexual Activity    Alcohol use: No    Drug use: No    Sexual activity: Not Currently     Partners: Male   Other Topics Concern    Not on file   Social History Narrative    Not on file     Social Determinants of Health     Financial Resource Strain: Medium Risk    Difficulty of Paying Living Expenses: Somewhat hard   Food Insecurity: No Food Insecurity    Worried About Running Out of Food in the Last Year: Never true    Ran Out of Food in the Last Year: Never true   Transportation Needs: Not on file   Physical Activity: Not on file   Stress: Not on file   Social Connections: Not on file   Intimate Partner Violence: Not on file   Housing Stability: Not on file       Review of Systems  Constitutional: []Fever []Sweats []Chills [] Recent Injury   [x] Denies all unless marked  HENT:[]Headache  [] Head Injury  [] Sore Throat  [] Ear Pain  [] Dizziness [] Hearing Loss   [x] Denies all unless marked  Spine:  [] Neck pain  [] Back pain  [] Sciatica  [x] Denies all unless marked  Cardiovascular:[]Chest Pain []Palpitations [] Heart Disease  [x] Denies all unless marked  Pulmonary: []Shortness of Breath []Cough   [x] Denies all unless marked  Gastrointestinal:  []Abdominal Pain  []Blood in Stool  []Diarrhea []Constipation []Nausea  []Vomiting [x] Denies all unless marked  Genitourinary:  [] Dysuria [] Frequency  [] Incontinence [] Urgency   [x] Denies all unless marked  Musculoskeletal: [] Arthralgia  [] Myalgias [] Muscle cramps  [] Muscle twitches   [x] Denies all unless marked   Extremities:   [] Pain   [] Swelling   [x] Denies all unless marked  Skin:[] Rash  [] Color Change  [x] Denies all unless marked  Neurological:[] Visual Disturbance [] Double Vision [] Slurred Speech [] Trouble swallowing  [] Vertigo [] Tingling [] Numbness [] Weakness [x] Loss of Balance   [] Loss of Consciousness [x] Memory Loss [] Seizures  [] Denies all unless marked  Psychiatric/Behavioral:[] Depression [] Anxiety  [x] Denies all unless marked  Sleep: [x]  Insomnia [] Sleep Disturbance [] Snoring [] Restless Legs [] Daytime Sleepiness [] Sleep Apnea  [] Denies all unless marked         Current Outpatient Medications   Medication Sig Dispense Refill    NOVOLOG FLEXPEN 100 UNIT/ML injection pen       DULoxetine (CYMBALTA) 30 MG extended release capsule TAKE 1 CAPSULE BY MOUTH DAILY 30 capsule 1    insulin glargine, 1 unit dial, (TOUJEO) 300 UNIT/ML concentrated injection pen Inject 60 Units into the skin nightly Use as directed 6 each 3    Semaglutide, 1 MG/DOSE, (OZEMPIC, 1 MG/DOSE,) 4 MG/3ML SOPN Inject 1 mg into the skin every 7 days 3 mL 3    Melatonin 10 MG TABS Take 1 tablet by mouth nightly      vitamin B-6 (PYRIDOXINE) 50 MG tablet Take 50 mg by mouth daily      meloxicam (MOBIC) 15 MG tablet Take 1 tablet by mouth once daily 90 tablet 1    levothyroxine (SYNTHROID) 200 MCG tablet Take 1 tablet by mouth Daily 90 tablet 1    simvastatin (ZOCOR) 80 MG tablet Take 1 tablet by mouth once daily 90 tablet 3    blood glucose test strips (AGAMATRIX PRESTO TEST) strip Test blood sugar 3 times daily. Dx diabetes 100 each 3     No current facility-administered medications for this visit.        Outpatient Medications Marked as Taking for the 1/19/23 encounter (Office Visit) with Rowdy Andersen MD   Medication Sig Dispense Refill    NOVOLOG FLEXPEN 100 UNIT/ML injection pen       DULoxetine (CYMBALTA) 30 MG extended release capsule TAKE 1 CAPSULE BY MOUTH DAILY 30 capsule 1    insulin glargine, 1 unit dial, (TOUJEO) 300 UNIT/ML concentrated injection pen Inject 60 Units into the skin nightly Use as directed 6 each 3    Semaglutide, 1 MG/DOSE, (OZEMPIC, 1 MG/DOSE,) 4 MG/3ML SOPN Inject 1 mg into the skin every 7 days 3 mL 3    Melatonin 10 MG TABS Take 1 tablet by mouth nightly      vitamin B-6 (PYRIDOXINE) 50 MG tablet Take 50 mg by mouth daily      meloxicam (MOBIC) 15 MG tablet Take 1 tablet by mouth once daily 90 tablet 1    levothyroxine (SYNTHROID) 200 MCG tablet Take 1 tablet by mouth Daily 90 tablet 1    simvastatin (ZOCOR) 80 MG tablet Take 1 tablet by mouth once daily 90 tablet 3    blood glucose test strips (AGAMATRIX PRESTO TEST) strip Test blood sugar 3 times daily. Dx diabetes 100 each 3       /68   Pulse 66   Ht 5' 7\" (1.702 m)   Wt 294 lb (133.4 kg)   SpO2 97%   BMI 46.05 kg/m²       Constitutional - well developed, well nourished. Eyes - conjunctiva normal.  Pupils react to light  Ear, nose, throat -hearing intact to finger rub No scars, masses, or lesions over external nose or ears, no atrophy of tongue  Neck-symmetric, no masses noted, no jugular vein distension. No bruits noted. Respiration- chest wall appears symmetric, good expansion,   normal effort without use of accessory muscles  Cardiovascular- RRR  Musculoskeletal - no significant wasting of muscles noted, no bony deformities, gait no gross ataxia  Extremities-no clubbing, cyanosis or edema  Skin - warm, dry, and intact. No rash, erythema, or pallor. Psychiatric - mood, affect, and behavior appear normal.      Neurological exam  Awake, alert, fluent oriented x 3 appropriate affect. Normal clock drawing. Follows complex commands. Knew the name of her oldest grandchild.   Short-term memory 3/4 at 5 minutes. 4/4 with cues. Gives poor effort on numerous questions. Attention and concentration appear appropriate  Speech normal without dysarthria  No clear issues with language of fund of knowledge    Cranial Nerve Exam   CN II- Visual fields grossly unremarkable. VA adequate. CN III, IV,VI- PERRLA, EOMI, No nystagmus, conjugate eye movements, no ptosis  CN VII-no facial asymmetry  CN VIII-Hearing intact   CN IX and X- Palate elevates in midline  CN XI-good shoulder shrug  CN XII-Tongue midline with no fasciculations or fibrillations    Motor Exam  V/V throughout upper and lower extremities bilaterally, no cogwheeling, normal tone      Reflexes   2+ biceps bilaterally  2+ brachioradialis  2+ triceps  2+patella    Tremors- no tremors in hands or head noted    Gait  Normal base and speed    Coordination  Finger to nose and BISHNU-unremarkable    Lab Results   Component Value Date    TGVTRFCG12 353 07/26/2022     Lab Results   Component Value Date    WBC 11.1 (H) 11/29/2022    HGB 15.0 11/29/2022    HCT 45.6 11/29/2022    MCV 91.4 11/29/2022     11/29/2022     Lab Results   Component Value Date     11/29/2022    K 4.1 11/29/2022     11/29/2022    CO2 26 11/29/2022    BUN 14 11/29/2022    CREATININE 0.6 11/29/2022    GLUCOSE 165 (H) 11/29/2022    CALCIUM 9.5 11/29/2022    PROT 6.7 11/29/2022    LABALBU 4.0 11/29/2022    BILITOT 0.4 11/29/2022    ALKPHOS 124 (H) 11/29/2022    AST 18 11/29/2022    ALT 14 11/29/2022    LABGLOM >60 11/29/2022    GFRAA >59 07/26/2022           Assessment    ICD-10-CM    1. Memory loss  R41.3 EEG awake and asleep     SLP eval and treat      2. Morbid obesity ([facility] [facility]  E66.01       3. History of diabetes mellitus  Z86.39         Patient's cognitive dysfunction is likely multifactorial.  I find no clear evidence of dementia based upon today's examination in which she did rather well.   Unclear if some of her difficulties could be related to hypoglycemia or perhaps an unwitnessed or unknown seizure disorder. She does have a history of ischemic change as well as hemorrhagic stroke remotely. Since she looks so well today I have not ordered any further testing. In addition to an EEG I have recommended cognitive rehab through speech therapy and then a follow-up visit in the future to reevaluate. More than 40 minutes were spent reviewing the patient's records, examination and and counseling and coordination of care. Plan  Orders Placed This Encounter   Procedures    SLP eval and treat     Standing Status:   Future     Standing Expiration Date:   1/19/2024    EEG awake and asleep     Standing Status:   Future     Standing Expiration Date:   1/19/2024     Order Specific Question:   Reason for exam:     Answer:   memory loss         Return in 3 months (on 4/19/2023).     (Please note that portions of this note were completed with a voice recognition program. Efforts were made to edit the dictations but occasionally words are mis-transcribed.)

## 2023-01-19 NOTE — PROGRESS NOTES
REVIEW OF SYSTEMS    Constitutional: []Fever []Sweats []Chills [] Recent Injury   [x] Denies all unless marked  HENT:[]Headache  [] Head Injury  [] Sore Throat  [] Ear Pain  [] Dizziness [] Hearing Loss   [x] Denies all unless marked  Spine:  [] Neck pain  [] Back pain  [] Sciatica  [x] Denies all unless marked  Cardiovascular:[]Chest Pain []Palpitations [] Heart Disease  [x] Denies all unless marked  Pulmonary: []Shortness of Breath []Cough   [x] Denies all unless marked  Gastrointestinal:  []Abdominal Pain  []Blood in Stool  []Diarrhea []Constipation []Nausea  []Vomiting  [x] Denies all unless marked  Genitourinary:  [] Dysuria [] Frequency  [] Incontinence [] Urgency   [x] Denies all unless marked  Musculoskeletal: [] Arthralgia  [] Myalgias [] Muscle cramps  [] Muscle twitches   [x] Denies all unless marked   Extremities:   [] Pain   [] Swelling   [x] Denies all unless marked  Skin:[] Rash  [] Color Change  [x] Denies all unless marked  Neurological:[] Visual Disturbance [] Double Vision [] Slurred Speech [] Trouble swallowing  [] Vertigo [] Tingling [] Numbness [] Weakness [x] Loss of Balance   [] Loss of Consciousness [x] Memory Loss [] Seizures  [] Denies all unless marked  Psychiatric/Behavioral:[] Depression [] Anxiety  [x] Denies all unless marked  Sleep: [x]  Insomnia [] Sleep Disturbance [] Snoring [] Restless Legs [] Daytime Sleepiness [] Sleep Apnea  [] Denies all unless marked

## 2023-01-20 ENCOUNTER — TELEPHONE (OUTPATIENT)
Dept: INTERNAL MEDICINE | Age: 63
End: 2023-01-20

## 2023-01-20 NOTE — TELEPHONE ENCOUNTER
She is very concerned about her mother. They saw neurology yesterday and didn't get any answers. She is just at a loss on what to do for her mother. She is concerned for her safety and needs advise from Dr. Main Herr on what to do next.

## 2023-01-23 DIAGNOSIS — E11.9 TYPE 2 DIABETES MELLITUS WITHOUT COMPLICATIONS (HCC): ICD-10-CM

## 2023-01-23 RX ORDER — INSULIN ASPART 100 [IU]/ML
INJECTION, SOLUTION INTRAVENOUS; SUBCUTANEOUS
Qty: 15 ML | Refills: 3 | Status: SHIPPED | OUTPATIENT
Start: 2023-01-23

## 2023-01-24 ENCOUNTER — TELEPHONE (OUTPATIENT)
Dept: PSYCHIATRY | Age: 63
End: 2023-01-24

## 2023-01-24 NOTE — TELEPHONE ENCOUNTER
Called and lvm reminding pt of her appt for 1/25 @ 3 PM    Electronically signed by Zhnae Lindquist on 1/24/2023 at 3:50 PM

## 2023-01-25 ENCOUNTER — OFFICE VISIT (OUTPATIENT)
Dept: PSYCHIATRY | Age: 63
End: 2023-01-25
Payer: MEDICARE

## 2023-01-25 VITALS
BODY MASS INDEX: 45.99 KG/M2 | HEART RATE: 106 BPM | SYSTOLIC BLOOD PRESSURE: 143 MMHG | HEIGHT: 67 IN | DIASTOLIC BLOOD PRESSURE: 83 MMHG | TEMPERATURE: 97.6 F | WEIGHT: 293 LBS | OXYGEN SATURATION: 93 %

## 2023-01-25 DIAGNOSIS — G47.00 INSOMNIA, UNSPECIFIED TYPE: ICD-10-CM

## 2023-01-25 DIAGNOSIS — F32.A DEPRESSION, UNSPECIFIED DEPRESSION TYPE: Primary | ICD-10-CM

## 2023-01-25 DIAGNOSIS — E03.9 ACQUIRED HYPOTHYROIDISM: ICD-10-CM

## 2023-01-25 DIAGNOSIS — R41.89 COGNITIVE IMPAIRMENT: ICD-10-CM

## 2023-01-25 DIAGNOSIS — E11.65 UNCONTROLLED TYPE 2 DIABETES MELLITUS WITH HYPERGLYCEMIA (HCC): ICD-10-CM

## 2023-01-25 DIAGNOSIS — E11.65 UNCONTROLLED TYPE 2 DIABETES MELLITUS WITH HYPERGLYCEMIA (HCC): Primary | ICD-10-CM

## 2023-01-25 DIAGNOSIS — G47.10 HYPERSOMNIA: ICD-10-CM

## 2023-01-25 DIAGNOSIS — F41.9 ANXIETY DISORDER, UNSPECIFIED TYPE: ICD-10-CM

## 2023-01-25 LAB
ALBUMIN SERPL-MCNC: 4 G/DL (ref 3.5–5.2)
ALP BLD-CCNC: 120 U/L (ref 35–104)
ALT SERPL-CCNC: 21 U/L (ref 5–33)
ANION GAP SERPL CALCULATED.3IONS-SCNC: 9 MMOL/L (ref 7–19)
AST SERPL-CCNC: 24 U/L (ref 5–32)
BILIRUB SERPL-MCNC: 0.4 MG/DL (ref 0.2–1.2)
BUN BLDV-MCNC: 18 MG/DL (ref 8–23)
CALCIUM SERPL-MCNC: 9.3 MG/DL (ref 8.8–10.2)
CHLORIDE BLD-SCNC: 102 MMOL/L (ref 98–111)
CO2: 30 MMOL/L (ref 22–29)
CREAT SERPL-MCNC: 0.6 MG/DL (ref 0.5–0.9)
GFR SERPL CREATININE-BSD FRML MDRD: >60 ML/MIN/{1.73_M2}
GLUCOSE BLD-MCNC: 153 MG/DL (ref 74–109)
HBA1C MFR BLD: 7.6 % (ref 4–6)
HCT VFR BLD CALC: 46.9 % (ref 37–47)
HEMOGLOBIN: 15.3 G/DL (ref 12–16)
MCH RBC QN AUTO: 29.8 PG (ref 27–31)
MCHC RBC AUTO-ENTMCNC: 32.6 G/DL (ref 33–37)
MCV RBC AUTO: 91.4 FL (ref 81–99)
PDW BLD-RTO: 13.2 % (ref 11.5–14.5)
PLATELET # BLD: 333 K/UL (ref 130–400)
PMV BLD AUTO: 10 FL (ref 9.4–12.3)
POTASSIUM SERPL-SCNC: 4.3 MMOL/L (ref 3.5–5)
RBC # BLD: 5.13 M/UL (ref 4.2–5.4)
SODIUM BLD-SCNC: 141 MMOL/L (ref 136–145)
TOTAL PROTEIN: 7.1 G/DL (ref 6.6–8.7)
TSH SERPL DL<=0.05 MIU/L-ACNC: 0.38 UIU/ML (ref 0.27–4.2)
WBC # BLD: 8.7 K/UL (ref 4.8–10.8)

## 2023-01-25 PROCEDURE — 3077F SYST BP >= 140 MM HG: CPT | Performed by: PSYCHIATRY & NEUROLOGY

## 2023-01-25 PROCEDURE — 99214 OFFICE O/P EST MOD 30 MIN: CPT | Performed by: PSYCHIATRY & NEUROLOGY

## 2023-01-25 PROCEDURE — 3079F DIAST BP 80-89 MM HG: CPT | Performed by: PSYCHIATRY & NEUROLOGY

## 2023-01-25 RX ORDER — DULOXETIN HYDROCHLORIDE 60 MG/1
60 CAPSULE, DELAYED RELEASE ORAL DAILY
Qty: 30 CAPSULE | Refills: 1 | Status: SHIPPED | OUTPATIENT
Start: 2023-01-25 | End: 2023-02-24

## 2023-01-25 ASSESSMENT — PATIENT HEALTH QUESTIONNAIRE - PHQ9
1. LITTLE INTEREST OR PLEASURE IN DOING THINGS: 1
10. IF YOU CHECKED OFF ANY PROBLEMS, HOW DIFFICULT HAVE THESE PROBLEMS MADE IT FOR YOU TO DO YOUR WORK, TAKE CARE OF THINGS AT HOME, OR GET ALONG WITH OTHER PEOPLE: 0
SUM OF ALL RESPONSES TO PHQ9 QUESTIONS 1 & 2: 1
5. POOR APPETITE OR OVEREATING: 0
9. THOUGHTS THAT YOU WOULD BE BETTER OFF DEAD, OR OF HURTING YOURSELF: 0
6. FEELING BAD ABOUT YOURSELF - OR THAT YOU ARE A FAILURE OR HAVE LET YOURSELF OR YOUR FAMILY DOWN: 0
8. MOVING OR SPEAKING SO SLOWLY THAT OTHER PEOPLE COULD HAVE NOTICED. OR THE OPPOSITE, BEING SO FIGETY OR RESTLESS THAT YOU HAVE BEEN MOVING AROUND A LOT MORE THAN USUAL: 0
SUM OF ALL RESPONSES TO PHQ QUESTIONS 1-9: 2
3. TROUBLE FALLING OR STAYING ASLEEP: 0
SUM OF ALL RESPONSES TO PHQ QUESTIONS 1-9: 2
SUM OF ALL RESPONSES TO PHQ QUESTIONS 1-9: 2
7. TROUBLE CONCENTRATING ON THINGS, SUCH AS READING THE NEWSPAPER OR WATCHING TELEVISION: 0
2. FEELING DOWN, DEPRESSED OR HOPELESS: 0
4. FEELING TIRED OR HAVING LITTLE ENERGY: 1
SUM OF ALL RESPONSES TO PHQ QUESTIONS 1-9: 2

## 2023-01-25 NOTE — PROGRESS NOTES
1/25/2023 4:10 PM   Progress Note          Violeta Pearson Omari 1960      Chief Complaint   Patient presents with    Medication Check         Subjective:    58-year-old white female with history of depression, brain hemorrhage s/p post fall in 2009, diabetes, thyroid dysfunction, presents for follow up. MRI in 2009 showed evidence of microvascular ischemia. Recent slums score 17 out of 30. Cymbalta was restarted for depression and melatonin was given for sleep. She comes with her daughter. No significant changes in the mood or sleep. Still sleeping during the day and up at night. Patient states this is because she worked night shifts. She denies depression. States she is functioning well. Daughter disagrees. We discussed sleep study Pt and daughter receptive.          BP: BP (!) 143/83   Pulse (!) 106   Temp 97.6 °F (36.4 °C)   Ht 5' 7\" (1.702 m)   Wt 299 lb 8 oz (135.9 kg)   SpO2 93%   BMI 46.91 kg/m²       Review of Systems - 14 point review:  Negative except for    Constitutional: (fevers, chills, night sweats, wt loss/gain, change in appetite, fatigue, somnolence)    HEENT: (ear pain or discharge, hearing loss, ear ringing, sinus pressure, nosebleed, nasal discharge, sore throat, oral sores, tooth pain, bleeding gums, hoarse voice, neck pain)      Cardiovascular: (HTN, chest pain, elevated cholesterol/lipids, palpitations, leg swelling, leg pain with walking)    Respiratory: (cough, wheezing, snoring, SOB with activity (dyspnea), SOB while lying flat (orthopnea), awakening with severe SOB (paroxysmal nocturnal dyspnea))    Gastrointestinal: (NVD, constipation, abdominal pain, bright red stools, black tarry stools, stool incontinence)     Genitourinary:  (pelvic pain, burning or frequency of urination, urinary urgency, blood in urine incomplete bladder emptying, urinary incontinence, STD; MEN: testicular pain or swelling, erectile dysfunction; WOMEN: LMP, heavy menstrual bleeding (menorrhagia), irregular periods, postmenopausal bleeding, menstrual pain (dymenorrhea, vaginal discharge)    Musculoskeletal: (bone pain/fracture, joint pain or swelling, musle pain)    Integumentary: (rashes, acne, non-healing sores, itching, breast lumps, breast pain, nipple discharge, hair loss)    Neurologic: (HA, muscle weakness, paresthesias (numbness, coldness, crawling or prickling), memory loss, seizure, dizziness)    Endocrine: (heat or cold intolerance, excessive thirst (polydipsia), excessive hunger (polyphagia))    Immune/Allergic: (hives, seasonal or environmental allergies, HIV exposure)    Hematologic/Lymphatic: (lymph node enlargement, easy bleeding or bruising)    History obtained via chart review and patient    PCP is Arsh Cisse MD       Current Meds:    Prior to Admission medications    Medication Sig Start Date End Date Taking?  Authorizing Provider   DULoxetine (CYMBALTA) 60 MG extended release capsule Take 1 capsule by mouth daily 1/25/23 2/24/23 Yes Alexei Murphy MD   NOVOLOG FLEXPEN 100 UNIT/ML injection pen Inject 6 units for blood sugar 250-300, 12 units for blood sugar 300-350, 18 units for blood sugar over 350  3-4 times daily as directed 1/23/23   Arsh Cisse MD   insulin glargine, 1 unit dial, (TOUJEO) 300 UNIT/ML concentrated injection pen Inject 60 Units into the skin nightly Use as directed 1/3/23   Arsh Cisse MD   Semaglutide, 1 MG/DOSE, (OZEMPIC, 1 MG/DOSE,) 4 MG/3ML SOPN Inject 1 mg into the skin every 7 days 1/3/23   Arsh Cisse MD   Melatonin 10 MG TABS Take 1 tablet by mouth nightly 12/12/22   Historical Provider, MD   vitamin B-6 (PYRIDOXINE) 50 MG tablet Take 50 mg by mouth daily 12/12/22   Historical Provider, MD   meloxicam (MOBIC) 15 MG tablet Take 1 tablet by mouth once daily 12/9/22   Arsh Cisse MD   levothyroxine (SYNTHROID) 200 MCG tablet Take 1 tablet by mouth Daily 12/9/22   Arsh Cisse MD   simvastatin (ZOCOR) 80 MG tablet Take 1 tablet by mouth once daily 11/2/22   Del Whyte MD   blood glucose test strips (AGAMATRIX PRESTO TEST) strip Test blood sugar 3 times daily. Dx diabetes 9/13/22   Del Whyte MD   insulin NPH (HUMULIN N) 100 UNIT/ML injection vial 40 units twice daily 9/12/22 10/27/22  Del Whyte MD   insulin regular (HUMULIN R) 100 UNIT/ML injection 10 units for every 10 above 100 take 3 times a day before meals  Patient taking differently: 10 units for every 10 above 100 take 3 times a day before meals 40 UNITS MAX DAILY DOSE 9/12/22 10/27/22  Del Whyte MD   vitamin D (ERGOCALCIFEROL) 1.25 MG (40798 UT) CAPS capsule Take 1 capsule by mouth once a week 7/27/22 10/27/22  Del Whyte MD   zolpidem (AMBIEN) 10 MG tablet TAKE 1 TABLET BY MOUTH EVERY NIGHT AS NEEDED FOR SLEEP 1/4/22 10/27/22  Del Whyte MD   metFORMIN (GLUCOPHAGE) 1000 MG tablet TAKE ONE TABLET BY MOUTH TWICE DAILY WITH FOOD 1/23/19 12/19/19  Del Whyte MD     Social History     Socioeconomic History    Marital status:    Tobacco Use    Smoking status: Never    Smokeless tobacco: Never   Vaping Use    Vaping Use: Never used   Substance and Sexual Activity    Alcohol use: No    Drug use: No    Sexual activity: Not Currently     Partners: Male     Social Determinants of Health     Financial Resource Strain: Medium Risk    Difficulty of Paying Living Expenses: Somewhat hard   Food Insecurity: No Food Insecurity    Worried About Running Out of Food in the Last Year: Never true    Ran Out of Food in the Last Year: Never true       MSE:  Appearance: Appropriately groomed. Made good eye contact. Gait wabbly with a walker. No abnormal movements or tremor. Behavior: Slow  Speech: Normal in tone, volume, and quality. No slurring, dysarthria or pressured speech noted. Mood: \"Ok\"   Affect: Mood congruent. Thought Process: Appears linear  Thought Content: Denies active suicidal and homicidal ideations. No overt delusions or paranoia appreciated.    Perceptions: Denies auditory or visual hallucinations at present time. Not responding to internal stimuli. Concentration: Poor  Orientation: to person, place, year  Language: Intact. Fund of information: Intact. Memory: Recent impaired and remote appear intact  Impulsivity: Limited. Neurovegitative: Poor appetite and sleep. Insight: Limited. Judgment: Limited. Lab Results   Component Value Date     01/25/2023    K 4.3 01/25/2023     01/25/2023    CO2 30 (H) 01/25/2023    BUN 18 01/25/2023    CREATININE 0.6 01/25/2023    GLUCOSE 153 (H) 01/25/2023    CALCIUM 9.3 01/25/2023    PROT 7.1 01/25/2023    LABALBU 4.0 01/25/2023    BILITOT 0.4 01/25/2023    ALKPHOS 120 (H) 01/25/2023    AST 24 01/25/2023    ALT 21 01/25/2023    LABGLOM >60 01/25/2023    GFRAA >59 07/26/2022     Lab Results   Component Value Date/Time     01/25/2023 11:21 AM    K 4.3 01/25/2023 11:21 AM    K 3.9 12/21/2020 03:52 AM     01/25/2023 11:21 AM    CO2 30 01/25/2023 11:21 AM    BUN 18 01/25/2023 11:21 AM    CREATININE 0.6 01/25/2023 11:21 AM    GLUCOSE 153 01/25/2023 11:21 AM    CALCIUM 9.3 01/25/2023 11:21 AM      Lab Results   Component Value Date    CHOL 259 (H) 10/27/2022     Lab Results   Component Value Date    TRIG 215 (H) 10/27/2022     Lab Results   Component Value Date    HDL 68 10/27/2022     Lab Results   Component Value Date    LDLCALC 148 10/27/2022     No results found for: LABVLDL, VLDL  No results found for: [facility]  Lab Results   Component Value Date    LABA1C 7.6 (H) 01/25/2023     No results found for: EAG  Lab Results   Component Value Date    TSH 0.382 01/25/2023     Lab Results   Component Value Date    VITD25 15.8 (L) 07/26/2022         Assessment:   1. Depression, unspecified depression type    2. Anxiety disorder, unspecified type    3. Insomnia, unspecified type    4. Cognitive impairment    5. Hypersomnia        No evidence of acute suicidality, homicidality or psychosis observed.   Patient is psychiatrically stable. She will benefit from a sleep study. Plan:  1. Increase Cymbalta to help with depression. The risks, benefits, side effects, indications, contraindications, and adverse effects of the medications have been discussed. Yes.  2. The pt has verbalized understanding and has capacity to give informed consent. 3. The Danae Nelson report has been reviewed according to [facility] regulations. 4. Supportive therapy offered. 5. Follow up: Return in about 2 months (around 3/25/2023). 6. The patient has been advised to call with any problems. Instructed to call suicide hotline, 911 or come to the ER if suicidal or symptoms worsen. 7. Controlled substance Treatment Plan: NA  8. The above listed medications have been continued, modifications in meds and other orders/labs as follows:      Orders Placed This Encounter   Medications    DULoxetine (CYMBALTA) 60 MG extended release capsule     Sig: Take 1 capsule by mouth daily     Dispense:  30 capsule     Refill:  1          Orders Placed This Encounter   Procedures    [address]     Referral Priority:   Routine     Referral Type:   Eval and Treat     Referral Reason:   Specialty Services Required     Requested Specialty:   Sleep Center     Number of Visits Requested:   1    Home Sleep Study     Standing Status:   Future     Standing Expiration Date:   7/25/2023     Order Specific Question:   Location For Sleep Study     Answer:   [place]     Order Specific Question:   Select Sleep Lab Location     Answer:   [facility]         9. Additional comments:         10.Over 50% of the total visit time of 31 minutes was spent on counseling and/or coordination of care of:                        1. Depression, unspecified depression type    2. Anxiety disorder, unspecified type    3. Insomnia, unspecified type    4. Cognitive impairment    5.  Hypersomnia            Queta Lopez MD

## 2023-01-27 ENCOUNTER — OFFICE VISIT (OUTPATIENT)
Dept: INTERNAL MEDICINE | Age: 63
End: 2023-01-27

## 2023-01-27 VITALS
HEIGHT: 67 IN | DIASTOLIC BLOOD PRESSURE: 62 MMHG | WEIGHT: 293 LBS | HEART RATE: 104 BPM | BODY MASS INDEX: 45.99 KG/M2 | SYSTOLIC BLOOD PRESSURE: 110 MMHG | OXYGEN SATURATION: 96 %

## 2023-01-27 DIAGNOSIS — E66.01 MORBID OBESITY DUE TO EXCESS CALORIES (HCC): ICD-10-CM

## 2023-01-27 DIAGNOSIS — E11.9 TYPE 2 DIABETES MELLITUS WITHOUT COMPLICATION, WITH LONG-TERM CURRENT USE OF INSULIN (HCC): Primary | ICD-10-CM

## 2023-01-27 DIAGNOSIS — F09 COGNITIVE DYSFUNCTION: ICD-10-CM

## 2023-01-27 DIAGNOSIS — F33.1 MODERATE EPISODE OF RECURRENT MAJOR DEPRESSIVE DISORDER (HCC): ICD-10-CM

## 2023-01-27 DIAGNOSIS — E11.65 UNCONTROLLED TYPE 2 DIABETES MELLITUS WITH HYPERGLYCEMIA (HCC): ICD-10-CM

## 2023-01-27 DIAGNOSIS — E03.9 ACQUIRED HYPOTHYROIDISM: ICD-10-CM

## 2023-01-27 DIAGNOSIS — M15.9 GENERALIZED OSTEOARTHRITIS: ICD-10-CM

## 2023-01-27 DIAGNOSIS — Z79.4 TYPE 2 DIABETES MELLITUS WITHOUT COMPLICATION, WITH LONG-TERM CURRENT USE OF INSULIN (HCC): Primary | ICD-10-CM

## 2023-01-27 DIAGNOSIS — I10 ESSENTIAL HYPERTENSION: ICD-10-CM

## 2023-01-27 DIAGNOSIS — E78.2 MIXED HYPERLIPIDEMIA: ICD-10-CM

## 2023-01-27 NOTE — PROGRESS NOTES
Chief Complaint   Patient presents with    Follow-up       HPI: Patient is here today with her daughter for follow-up of diabetes follow-up of cognitive dysfunction history of head injury cognitive dysfunction mood disorder just really difficult situation at home noncompliance multifactorial.  Very difficult home situation which really contributes to think she is much more interactive and talkative now that one of her daughters who is with her today has moved back and is helping manage things.   I see the patient's mild and interact more like she used to it sounds like there is a very difficult situation at home that they are trying to resolve I reviewed neurology's notes and psychiatry's notes    Past Medical History:   Diagnosis Date    Acquired hypothyroidism     Diabetes ([address]     Episode of recurrent major depressive disorder ([address] 1/10/2021    Generalized osteoarthritis 3/21/2018    Hypothyroidism     Morbid obesity due to excess calories ([address] 6/24/2018    Movement disorder     joint pain    Type 2 diabetes mellitus without complication, with long-term current use of insulin ([address]        Past Surgical History:   Procedure Laterality Date    CHOLECYSTECTOMY      TUBAL LIGATION         Family History   Problem Relation Age of Onset    Diabetes Mother     Bipolar Disorder Mother        Social History     Socioeconomic History    Marital status:      Spouse name: Not on file    Number of children: Not on file    Years of education: Not on file    Highest education level: Not on file   Occupational History    Not on file   Tobacco Use    Smoking status: Never    Smokeless tobacco: Never   Vaping Use    Vaping Use: Never used   Substance and Sexual Activity    Alcohol use: No    Drug use: No    Sexual activity: Not Currently     Partners: Male   Other Topics Concern    Not on file   Social History Narrative    Not on file     Social Determinants of Health     Financial Resource Strain: Not on file   Food Insecurity: Not on file   Transportation Needs: Not on file   Physical Activity: Not on file   Stress: Not on file   Social Connections: Not on file   Intimate Partner Violence: Not on file   Housing Stability: Not on file       Allergies   Allergen Reactions    Compazine [Prochlorperazine Maleate] Anaphylaxis       Current Outpatient Medications   Medication Sig Dispense Refill    DULoxetine (CYMBALTA) 60 MG extended release capsule Take 1 capsule by mouth daily 30 capsule 1    NOVOLOG FLEXPEN 100 UNIT/ML injection pen Inject 6 units for blood sugar 250-300, 12 units for blood sugar 300-350, 18 units for blood sugar over 350  3-4 times daily as directed 15 mL 3    insulin glargine, 1 unit dial, (TOUJEO) 300 UNIT/ML concentrated injection pen Inject 60 Units into the skin nightly Use as directed 6 each 3    Semaglutide, 1 MG/DOSE, (OZEMPIC, 1 MG/DOSE,) 4 MG/3ML SOPN Inject 1 mg into the skin every 7 days 3 mL 3    Melatonin 10 MG TABS Take 1 tablet by mouth nightly      vitamin B-6 (PYRIDOXINE) 50 MG tablet Take 50 mg by mouth daily      meloxicam (MOBIC) 15 MG tablet Take 1 tablet by mouth once daily 90 tablet 1    levothyroxine (SYNTHROID) 200 MCG tablet Take 1 tablet by mouth Daily 90 tablet 1    simvastatin (ZOCOR) 80 MG tablet Take 1 tablet by mouth once daily 90 tablet 3    blood glucose test strips (AGAMATRIX PRESTO TEST) strip Test blood sugar 3 times daily. Dx diabetes 100 each 3     No current facility-administered medications for this visit.        Review of Systems    /62   Pulse (!) 104   Ht 5' 7\" (1.702 m)   Wt 299 lb 9.6 oz (135.9 kg)   SpO2 96%   BMI 46.92 kg/m²   BP Readings from Last 7 Encounters:   01/27/23 110/62   01/25/23 (!) 143/83   01/19/23 116/68   12/09/22 124/78   11/23/22 117/70   10/27/22 110/70   07/26/22 120/76     Wt Readings from Last 7 Encounters:   01/27/23 299 lb 9.6 oz (135.9 kg)   01/25/23 299 lb 8 oz (135.9 kg)   01/19/23 294 lb (133.4 kg)   12/09/22 294 lb (133.4 kg)   11/23/22 292 lb (132.5 kg)   10/27/22 291 lb (132 kg)   07/26/22 (!) 308 lb (139.7 kg)     BMI Readings from Last 7 Encounters:   01/27/23 46.92 kg/m²   01/25/23 46.91 kg/m²   01/19/23 46.05 kg/m²   12/09/22 46.05 kg/m²   11/23/22 45.73 kg/m²   10/27/22 45.58 kg/m²   07/26/22 48.24 kg/m²     Resp Readings from Last 7 Encounters:   02/04/21 22   01/29/21 20   01/22/21 20   01/14/21 16   01/04/21 20   12/21/20 20   12/20/20 20       Physical Exam  Constitutional:       General: She is not in acute distress. Appearance: She is obese. Eyes:      General: No scleral icterus. Cardiovascular:      Heart sounds: Normal heart sounds. Pulmonary:      Breath sounds: Normal breath sounds. Musculoskeletal:      Cervical back: Neck supple. Lymphadenopathy:      Cervical: No cervical adenopathy. Skin:     Findings: No rash. Psychiatric:      Comments: Affect is much more reactive than it has been in the past years.   There seems to be some depression but at the same time much improved       Results for orders placed or performed in visit on 01/25/23   TSH   Result Value Ref Range    TSH 0.382 0.270 - 4.200 uIU/mL   Hemoglobin A1C   Result Value Ref Range    Hemoglobin A1C 7.6 (H) 4.0 - 6.0 %   Comprehensive Metabolic Panel   Result Value Ref Range    Sodium 141 136 - 145 mmol/L    Potassium 4.3 3.5 - 5.0 mmol/L    Chloride 102 98 - 111 mmol/L    CO2 30 (H) 22 - 29 mmol/L    Anion Gap 9 7 - 19 mmol/L    Glucose 153 (H) 74 - 109 mg/dL    BUN 18 8 - 23 mg/dL    Creatinine 0.6 0.5 - 0.9 mg/dL    Est, Glom Filt Rate >60 >60    Calcium 9.3 8.8 - 10.2 mg/dL    Total Protein 7.1 6.6 - 8.7 g/dL    Albumin 4.0 3.5 - 5.2 g/dL    Total Bilirubin 0.4 0.2 - 1.2 mg/dL    Alkaline Phosphatase 120 (H) 35 - 104 U/L    ALT 21 5 - 33 U/L    AST 24 5 - 32 U/L   CBC   Result Value Ref Range    WBC 8.7 4.8 - 10.8 K/uL    RBC 5.13 4.20 - 5.40 M/uL    Hemoglobin 15.3 12.0 - 16.0 g/dL    Hematocrit 46.9 37.0 - 47.0 %    MCV 91.4 81.0 - 99.0 fL    MCH 29.8 27.0 - 31.0 pg    MCHC 32.6 (L) 33.0 - 37.0 g/dL    RDW 13.2 11.5 - 14.5 %    Platelets 574 395 - 547 K/uL    MPV 10.0 9.4 - 12.3 fL       ASSESSMENT/ PLAN:  1. Type 2 diabetes mellitus without complication, with long-term current use of insulin ([facility])  Chart, medications, labs, vaccines reviewed. Keep up to date with routine care and follow up. Call with any problems or complaints. Keep up to date with routine screening recomendations and vaccines. We congratulated her on the best A1c she has had in a long long time. I explained to her that having more of a schedule and her family helping her has been immensely important. Her home living situation is not good and if that can be improved I really think her diabetes her mood her cognition everything will improve. - Comprehensive Metabolic Panel; Future  - Hemoglobin A1C; Future  - Lipid Panel; Future  - Microalbumin / Creatinine Urine Ratio; Future    2. Uncontrolled type 2 diabetes mellitus with hyperglycemia ([facility]  Again we congratulated her much better emphasized the importance of continuing to let her  and work toward a better schedule and regimen. Oftentimes she has skipped meds etc. it sounds like she is getting them much more routinely. Her insurance also might be better if that is the case we can continue to make other improvements    3. Moderate episode of recurrent major depressive disorder ([facility]  We appreciate the help of psychiatry continue follow-up with management    4. Cognitive dysfunction  We reviewed psychiatry and neurology's notes avoiding other meds referral to speech therapy  - [address]    5. Essential hypertension  Overall appears to have good blood pressure control watch closely    6. Mixed hyperlipidemia  Follow goal LDL under 70    7. Acquired hypothyroidism  Thyroid is normal    8.  Generalized osteoarthritis  She takes Mobic watch closely weight loss would be of great impact    9.  Morbid obesity due to excess calories ([facility])  Increase activity follow--daughter is helping get her to be more active

## 2023-02-06 ENCOUNTER — HOSPITAL ENCOUNTER (OUTPATIENT)
Dept: SPEECH THERAPY | Age: 63
Setting detail: THERAPIES SERIES
Discharge: HOME | End: 2023-02-06
Payer: MEDICARE

## 2023-02-06 PROCEDURE — 92523 SPEECH SOUND LANG COMPREHEN: CPT

## 2023-02-06 NOTE — PROGRESS NOTES
Speech Language Pathology  Facility/Department: [facility] SPEECH THERAPY  Initial Assessment    NAME: Ary Ray  : 1960  MRN: [id number]    Date of Eval: 2023  Evaluating Therapist: ZACK Maldonado    Past Medical History: has a past medical history of Acquired hypothyroidism, Diabetes ([facility] Episode of recurrent major depressive disorder ([facility] Generalized osteoarthritis, Hypothyroidism, Morbid obesity due to excess calories ([address] Movement disorder, and Type 2 diabetes mellitus without complication, with long-term current use of insulin ([facility] Past Surgical History:  has a past surgical history that includes Cholecystectomy and Tubal ligation. Primary Complaint:   Daughter Shadia present and aided in providing case history. Per daughter, patient had a TBI  and has had a small stroke. According to daughter, patient has \"sectioned herself off\" in her bedroom the past 4-5 years and stopped coming out of her room. Per daughter, patient experiences severe brain fog. When asked if there was a significant event that occurred around the same time (4-5 years ago), daughter reports patient experienced a lifestyle change. Daughter reports patient worked midnights for approximately 8 years, approximately 25 years ago. Patient did not initially agree to this and stated \"you're nuts\". Patient wants to stay up midnights. Patient made the statement, \"I'm not depressed I don't think\". During evaluation, patient stated she can't remember recipes and has forgotten to turn off burner. Pertinent Medical History:  Taken from neurology visit 2023:  \"Patient's cognitive dysfunction is likely multifactorial.  I find no clear evidence of dementia based upon today's examination in which she did rather well. Unclear if some of her difficulties could be related to hypoglycemia or perhaps an unwitnessed or unknown seizure disorder.   She does have a history of ischemic change as well as hemorrhagic stroke remotely. Since she looks so well today I have not ordered any further testing. In addition to an EEG I have recommended cognitive rehab through speech therapy and then a follow-up visit in the future to reevaluate. More than 40 minutes were spent reviewing the patient's records, examination and and counseling and coordination of care. \"    Subjective:  Visit Information  SLP Insurance Information: [facility] Medicare: Authorization after evaluation  Patient lives at home with oldest daughter and 2 grandchildren. According to daughter/patient, patient has not slept yet today as she stays up at night and sleeps during the day. Assessments: The CLQT was completed on this date. The Cognitive Linguistic Quick Test (CLQT) was developed for use with adults with acquired neurological dysfunction. This individually administered test is designed to gain information about cognitive-linguistic domains, a total composite severity rating and a clock drawing severity rating. The CLQT consists of ten tasks: Personal facts, Symbol cancellation, Confrontation naming, Clock drawing, Story retelling, Symbol trails, Generative naming, Design memory, Mazes, and Design generation. CLQT  Cognitive Domain Scoring Range Score Severity   Attention 215-180 189 WNL   Memory 140-110 131 Moderate   Executive Function 40-24 29 WNL   Language 37-29 29 WNL   Visuospatial 105-82 85 WNL   Composite 4.0-3.5 3.6        WNL      Patient achieved scores in the Moderate range for the cognitive domain of memory. Patient scored in the WNL range for the domains of attention, executive function, language, visuospatial, and the overall composite score. Patient scored below the criterion cut score for the following subtests: personal facts, clock drawing, design memory, and mazes. Patient achieved score of 10 on clock drawing subtest placed in the Mild range (11-10).       Observations made during the CLQT:  In symbol cancellations task, patient noted to ask if specific symbols had been crossed out. In clock drawing subtest, patient completed task in less than 1 minute. Patient began prior to start of time/task. Patient put 6 total numbers on clock face. In story retell task, patient stated, \"I do have a problem remembering stuff\". In generative naming task, patient noted to ask \"did I say . Buffy Garth Buffy Homer Buffy Garth \". Design memory task was completed, however, patient made second answer selection for one trial past the 10 second time limit. Due to time limit, patient scored 4. With additional time, patient score was 5. During design generation subtest, patient demonstrated awareness but not self correction in stating \"I think I'm duplicating but I can't tell\" \"aint got time to stop and look\" and \"I've already done that one. Too late\". Patient curious to know performance throughout testing. Additional Assessments:  Portions of the The IM5, Western Aphasia Battery and the Crash Inventory were used to assess the patient's cognitive linguistic skills including auditory processing, recall/short-term memory with time delays and environmental distracters and problem solving/reasoning, as well as overall expressive language skills. Results are listed below. In relation to patient's memory/recall ability, patient was 9 out of 10 during immediate recall trials with a sequenced number and/or word set up to 7 items without repetitions provided. Patient completed working memory task to repeat given number/word set in reverse order with 3/4. Recall of paragraph information was completed with 63% accuracy. Patient completed distracted delayed recall of approximately 5 minutes with 0% accuracy. With verbal cues/prompts, patient was successful 2/6 trials. Short-term memory trials answered accurately 2/3 trials with delay noted for one trial. Long term memory assessed in patient ability to provide SSN and phone number.  Patient noted to quickly provide SSN, however, question missing digit. Patient provided phone number accurate with \"work phone\" as listed in EMR. Patient was 87% on all yes/no questions independently. Patient incorrectly self corrected one trial, then stated \"I don't know\". With repetition of trial, patient was accurate. In another trial, patient did not provide y/n answer. Patient did not recall directions in this task to provide y/n answer. Patient followed 1 and 2 step directions with 100% accuracy. During 3 step directions, patient was 80% accurate. The term executive function describes a set of cognitive abilities that control and regulate other abilities and behaviors. Executive functions are necessary for goal-directed behavior. They include the ability to initiate and stop actions, to monitor and change behavior as needed, and to plan future behavior when faced with novel tasks and situations. Executive functions allow us to anticipate outcomes and adapt to changing situations. The ability to form concepts and think abstractly is often considered components of executive function. As the name implies, executive functions are high-level abilities that influence more basic abilities like attention, memory and motor skills. Executive functions are important for successful adaptation and performance in real-life situations. They allow people to initiate and complete tasks and to persevere in the face of challenges. Because the environment can be unpredictable, executive functions are vital to human ability to recognize the significance of unexpected situations and to make alternative plans quickly when unusual events arise and interfere with normal routines. In this way, executive function contributes to success in work and at home and allows people to manage the stresses of daily life. In organizational/sequencing skills, patient was 100% with verbal prompt for one trial; during categorization, patient was 80%.  When generating ideas related to reasoning/problem solving tasks, patient was 75% with self correction for one trial. Basic calculations task was completed with 92% with  delayed response of one trial.         Areas Assessed Total Score Possible Score   Auditory Comprehension 13/15 15/15   Follow 1 Step Verbal Directions 5/5 5/5   Follow 2 Step Verbal Directions 5/5 5/5   Follow 3 Step Verbal Directions 4/5 5/5   Memory/Recall   Immediate Recall  9/10       10/10   Working Memory 3/4 4/4   Recall of Paragraph Information 5/8 8/8   Short Term Memory (including 5 minute distracted delay) 2/9 9/9   Long Term Memory 1/2 2/2   Sequencing/Organization  5/5 5/5   Categorization 4/5 5/5   Problem Solving and Reasoning 6/8 6/8   Basic Calculation 11/12 12/12     Plan:   Duration/Frequency of Treatment  Duration of Treatment: 8-10 weeks  Frequency of Treatment: 1-2x/week  Recommendations  Requires SLP Intervention: Yes    Summary: Patient scored in the Moderate range for the cognitive domain of memory. Patient scored in the WNL range for the domains of attention, executive function, language, visuospatial, and the overall composite score. Patient scored below the criterion cut score for the following subtests: personal facts, clock drawing, design memory, and mazes. Patient achieved score of 10 on clock drawing subtest placed in the Mild range (11-10). Patient demonstrated decreased short term recall in distracted delay task. Patient also demonstrated mild impairment in problem solving/reasoning skills. Patient demonstrates awareness of deficits in short term memory through verbal expression. Patient would benefit from skilled speech therapy to address aforementioned deficits. RECOMMENDATIONS:  Based on assessment results, speech therapy is recommended at 1x/wk for approximately 8-10 weeks. Treatment will target the short term goals listed below. Patient will complete memory tasks with 80% with no-min prompts/cues. Patient will complete executive functioning tasks with 80% with no-min prompts/cues. Patient will demonstrate understanding internal and external memory strategies through verbal production of a minimum of 3 of each target over two consecutive sessions.   Patient/family will demonstrate understanding of HEP.        _______________________                         ___________________________                            Max Ambriz MS, CCC-SLP                    Physician's Signature  Speech Language Pathologist                       PLEASE SIGN AND REFAX -[id number]     cc:  Jacquelin Amaya MD    Electronically signed by ZACK Rosenberg on 2/6/2023 at 11:49 AM

## 2023-02-07 DIAGNOSIS — E11.9 TYPE 2 DIABETES MELLITUS WITHOUT COMPLICATIONS (HCC): ICD-10-CM

## 2023-02-07 RX ORDER — INSULIN ASPART 100 [IU]/ML
INJECTION, SOLUTION INTRAVENOUS; SUBCUTANEOUS
Qty: 15 ML | Refills: 3 | Status: SHIPPED | OUTPATIENT
Start: 2023-02-07

## 2023-02-08 ENCOUNTER — TELEPHONE (OUTPATIENT)
Dept: INTERNAL MEDICINE | Age: 63
End: 2023-02-08

## 2023-02-08 NOTE — TELEPHONE ENCOUNTER
S/w daughter, states patient has still been using Novolog; she cannot control her glucose levels without it. Daughter states Dr. Shravan Jalloh was wanting patient to wean off of this medication. Medicare D does not want to cover Novolog anymore; they prefer Humalog. Can Humalog be sent to [facility]?

## 2023-02-10 RX ORDER — INSULIN LISPRO 100 [IU]/ML
INJECTION, SOLUTION INTRAVENOUS; SUBCUTANEOUS
Qty: 15 ML | Refills: 3 | Status: SHIPPED | OUTPATIENT
Start: 2023-02-10

## 2023-02-13 ENCOUNTER — HOSPITAL ENCOUNTER (OUTPATIENT)
Dept: SPEECH THERAPY | Age: 63
Setting detail: THERAPIES SERIES
Discharge: HOME | End: 2023-02-13
Payer: MEDICARE

## 2023-02-13 PROCEDURE — 97130 THER IVNTJ EA ADDL 15 MIN: CPT

## 2023-02-13 PROCEDURE — 97129 THER IVNTJ 1ST 15 MIN: CPT

## 2023-02-13 NOTE — PROGRESS NOTES
Speech Language Pathology  Facility/Department: [facility] SPEECH THERAPY  Daily Treatment Note  NAME: Karis nSeed  : 1960  MRN: [id number]    Date of Treat: 2023  Treating Therapist: William Perez Medicare: Authorization after evaluation   Total # of Visits Approved 6   Total # of Visits to Date 1   Timeframe Approved From: 2/10/2023   Timeframe Approved To: 4/10/2023       Subjective:  Patient attended session with daughter. Objective:  Patient completed the Mental Slowness Questionnaire with questions. In completion of this task, the highest patient marked for trials was \"1\", which indicates \"this rarely happens to me, less than once a week\". Overall, patient rated at a 9 out of 84 (each question rating 0-4). In follow up question regarding \"how troublesome is this to you? \", for each trial, patient marked \"not\". Patient marked \"1\" for statements \"When I am listening to the radio, or watching television, I can't keep up with the story\", \"when I meet someone in the street, it takes a while before I remember who it is\", \"I can no longer perform tasks automatically, I have to think more (when doing household tasks or work)\", \"if I have to deal with unexpected events, I get restless or agitated\", \"I find it difficult to do two things at the same time (like doing household tasks when someone is talking to me, or cooking and making a telephone call at the same time)\", \"being in traffic (on foot, by bike or by car) is difficult for me, because I quickly get overwhelmed\", \"when I have to do two things at the same time, I get restless or agitated, or make mistakes\", \"I get distracted by my own thoughts, and then I make mistakes\", \"I get tired easily because everything seems to go so fast\". Patient expressed not being concerned or troubled by the things listed throughout the questionnaire.  Patient also stated not paying attention or doing some of the things listed on the questionnaire such as answering the phone, answering the door, or cooking. When asked why she doesn't do some of these activities, patient reports she can't remember recipes, the door is too far away, and she doesn't answer the phone because she doesn't want to talk to people she doesn't know. Patient stated, \"I have cut out all the stuff that aggravates me\", however, when asked what aggravates her, patient stated, \"I don't know\". Patient completed tasks with time pressure. In task to listen to directions and copy those down to draw given route on a map, patient gave up over 2 minutes in. Directions were repeated. Patient did not accurately complete. Approximately 5 minutes in, patient was still not accurate in task. In task to use a website to plan a trip, patient was provided with verbal cues/prompts from her daughter. Directions were repeated approximately 2 minutes into the task. Patient presented with difficulty in awareness/reasoning with use of computer. Patient did not independently utilize given website. Patient was provided verbal and visual cues/prompts to use given website, however, patient went to wrong website due to typing error. Task discontinued a little over 8 minutes in to task completion. Money sorting task was completed with fake money. Patient tasked to sort coins into each given amount. This task took over approximately 11.5 minutes. It should be noted, patient was missing one coin for accurate completion of one trial and time was lost due to this. Time may also have been negatively impacted due to using paper cut outs of real coins rather than real coins. Patient noted to skip one trial but was successful when attempted it. Task was completed with approximately 78% accuracy (including trial that was skipped, but was completed successfully when attempted; not including trial in which patient was missing coins to complete).  In trial in which patient did not have enough coins, patient did demonstrate ability to identify missing coins when asked; patient noted to count coins again. With multiple attempts for two trials, patient increased accuracy to 100% (not including trial in which patient was missing coins to complete). In task to utilize computer to find addresses of local buildings/businesses while being asked distracting questions, patient demonstrated good attention to task in being able to answer question and continue to complete task at hand. In order to get started on computer, patient was provided with visual and verbal cues/prompts/directives to utilize search engine. Task was completed in under 10 minutes (9 mins 55 seconds). For one trial, patient noted to not pay full attention to screen to locate address, however was able to independently troubleshoot this. Patient completed task, however, did not consistently produce the nearest location. Patient was approximately 57% accurate in identifying nearest address of the specific building. In distractor questions, patient demonstrated good long term memory in being able to answer first job as well as providing additional details about this. Patient did demonstrate delay in answering one distractor question and for another, answered \"no idea\". Patient also did not demonstrate ability to name elementary school by the name it was when she attended. Short Term Goals:  Patient will complete memory tasks with 80% with no-min prompts/cues. Patient will complete executive functioning tasks with 80% with no-min prompts/cues. Patient will demonstrate understanding internal and external memory strategies through verbal production of a minimum of 3 of each target over two consecutive sessions. Patient/family will demonstrate understanding of HEP. Plan:  Continued cognitive treatment with goals per plan of care.     Electronically signed by ZACK Mauro on 2/13/2023 at 1:14 PM

## 2023-02-17 ENCOUNTER — HOSPITAL ENCOUNTER (OUTPATIENT)
Dept: SLEEP CENTER | Age: 63
Discharge: HOME | End: 2023-02-19
Payer: MEDICARE

## 2023-02-17 DIAGNOSIS — G47.10 HYPERSOMNIA: ICD-10-CM

## 2023-02-17 PROCEDURE — G0399 HOME SLEEP TEST/TYPE 3 PORTA: HCPCS

## 2023-02-18 PROCEDURE — G0399 HOME SLEEP TEST/TYPE 3 PORTA: HCPCS

## 2023-02-20 ENCOUNTER — HOSPITAL ENCOUNTER (OUTPATIENT)
Dept: SPEECH THERAPY | Age: 63
Setting detail: THERAPIES SERIES
Discharge: HOME | End: 2023-02-20
Payer: MEDICARE

## 2023-02-20 PROCEDURE — 97130 THER IVNTJ EA ADDL 15 MIN: CPT

## 2023-02-20 PROCEDURE — 97129 THER IVNTJ 1ST 15 MIN: CPT

## 2023-02-20 NOTE — PROGRESS NOTES
Speech Language Pathology  Facility/Department: [facility] SPEECH THERAPY  Daily Treatment Note  NAME: Jovana Javier  : 1960  MRN: [id number]    Date of Treat: 2023  Treating Therapist: MEIR Chacon Medicare: Authorization after evaluation   Total # of Visits Approved 6   Total # of Visits to Date 2   Timeframe Approved From: 2/10/2023   Timeframe Approved To: 4/10/2023         Subjective:  Patient attended session with daughter. Patient has not slept. Patient noted to be distractible in session. Objective: Went over mental slowness questionnaire with patient and daughter. Patient reportedly watches [employer] short clip videos and does not regularly watch television shows or movies. Patient reports this is due to being \"lazy\" and wanting all the information quickly. Daughter reports patient has difficulty remembering names. Patient did report feeling as though she has to think more and requires more time to do work/tasks (more time statement rated at 1 or 2 (rarely-approximately 1x/week)). Patient denies feeling time pressure. Patient stated \"sometimes\" in getting restless or agitated due to unexpected events. When going too fast, patient makes mistakes or forgets things approximately 1x/week. Difficulty completing two tasks at once occurs approximately 2x/week. Daughter reports attention is ok with two things, however, decreases with 3-4 distractors. In statement, \"I get tired easily because everything seems to go so fast\", this occurs \"occasionally\". Daughter reports overstimulation occurs on a daily basis. When asked regarding getting tired easily when completing two tasks at once, patient reported \"sure\". According to daughter, high pressure does not bother patient and patient is able to remain calm in high pressure situations.      Daughter reports patient experiences difficulty in cooking; deciding what to make.    Addressed mental slowness and time pressure levels to aid patient in awareness. Provided education and written copy of 4 steps for time pressure management strategy. Patient was provided with verbal cues/prompts in verbal trial use of 4 steps in fictional situation of having children/grandchildren over for supper. Patient tasked to be aware of situations/activities she currently avoids or doesn't do due to increased difficulty/fatigue. Short Term Goals:  Patient will complete memory tasks with 80% with no-min prompts/cues. Patient will complete executive functioning tasks with 80% with no-min prompts/cues. Patient will demonstrate understanding internal and external memory strategies through verbal production of a minimum of 3 of each target over two consecutive sessions. Patient/family will demonstrate understanding of HEP. Plan:  Continued cognitive treatment with goals per plan of care.     Electronically signed by ZACK Chiang on 2/20/2023 at 11:50 AM

## 2023-02-27 ENCOUNTER — HOSPITAL ENCOUNTER (OUTPATIENT)
Dept: NEUROLOGY | Age: 63
Discharge: HOME | End: 2023-02-27
Payer: MEDICARE

## 2023-02-27 DIAGNOSIS — R41.3 MEMORY LOSS: ICD-10-CM

## 2023-02-27 PROCEDURE — 95813 EEG EXTND MNTR 61-119 MIN: CPT

## 2023-02-27 PROCEDURE — 95813 EEG EXTND MNTR 61-119 MIN: CPT | Performed by: PSYCHIATRY & NEUROLOGY

## 2023-02-28 ENCOUNTER — APPOINTMENT (OUTPATIENT)
Dept: SPEECH THERAPY | Age: 63
End: 2023-02-28
Payer: MEDICARE

## 2023-02-28 PROCEDURE — 95813 EEG EXTND MNTR 61-119 MIN: CPT | Performed by: PSYCHIATRY & NEUROLOGY

## 2023-02-28 NOTE — PROCEDURES
[facility] [address] [facility]                          ELECTROENCEPHALOGRAM REPORT    PATIENT NAME: Isaias Bowden                   :        1960  MED REC NO:   [id number]                              ROOM:  ACCOUNT NO:   [de-identified]                           ADMIT DATE: 2023  PROVIDER:     Jeff Serrato MD    DATE OF EE2023    INDICATION FOR TEST:  Altered mental status. DESCRIPTION:  This is an extended EEG with recording time of 1 hour 2  minutes and 11 seconds. The waking background consists of a rhythmic  and symmetric well-formed and well-regulated posterior dominant 10 to 11  Hz activity. During drowsiness, background frequency decreased by 2 to  3 Hz. Brief stage II sleep is characterized by vertex sharp transients. No clear epileptiform activity nor any focal or lateralizing slowing was  noted. Photic stimulation produced no abnormalities. IMPRESSION:  Normal awake and brief sleep extended EEG. Correlate  clinically.         Kane Coates MD    D: 2023 11:27:49      T: 2023 11:32:55     [email or web address]  Job#: [id number]     Doc#: [id number]    CC:

## 2023-03-06 ENCOUNTER — APPOINTMENT (OUTPATIENT)
Dept: SPEECH THERAPY | Age: 63
End: 2023-03-06
Payer: MEDICARE

## 2023-03-13 ENCOUNTER — APPOINTMENT (OUTPATIENT)
Dept: SPEECH THERAPY | Age: 63
End: 2023-03-13
Payer: MEDICARE

## 2023-03-15 ENCOUNTER — TELEPHONE (OUTPATIENT)
Dept: INTERNAL MEDICINE | Age: 63
End: 2023-03-15

## 2023-03-17 ENCOUNTER — TELEPHONE (OUTPATIENT)
Dept: PSYCHIATRY | Age: 63
End: 2023-03-17

## 2023-03-17 NOTE — TELEPHONE ENCOUNTER
Called pt to cancel/reschedule appt for 03/21/23 with Dr. Abimael Pandya because the provider will not be in the office that day. Rescheduled 03/27/23 @ 8.     Electronically signed by Yuliet Price MA on 3/17/2023 at 4:05 PM

## 2023-03-20 ENCOUNTER — HOSPITAL ENCOUNTER (OUTPATIENT)
Dept: SPEECH THERAPY | Age: 63
Setting detail: THERAPIES SERIES
Discharge: HOME | End: 2023-03-20
Payer: MEDICARE

## 2023-03-20 PROCEDURE — 97130 THER IVNTJ EA ADDL 15 MIN: CPT

## 2023-03-20 PROCEDURE — 97129 THER IVNTJ 1ST 15 MIN: CPT

## 2023-03-20 NOTE — PROGRESS NOTES
Saleem Lopez MS, CCC-SLP                    Physician's Signature  Speech Language Pathologist                       PLEASE SIGN AND REFAX -[id number]     cc:  Irene Torres MD    Electronically signed by ZACK Champagne on 3/20/2023 at 4:56 PM

## 2023-03-24 ENCOUNTER — TELEPHONE (OUTPATIENT)
Dept: PSYCHIATRY | Age: 63
End: 2023-03-24

## 2023-03-24 NOTE — TELEPHONE ENCOUNTER
Called pt for appointment reminder.     -Pt confirmed      Electronically signed by Pablo Borjas MA on 3/24/2023 at 3:31 PM

## 2023-03-27 ENCOUNTER — OFFICE VISIT (OUTPATIENT)
Dept: PSYCHIATRY | Age: 63
End: 2023-03-27

## 2023-03-27 ENCOUNTER — HOSPITAL ENCOUNTER (OUTPATIENT)
Dept: SPEECH THERAPY | Age: 63
Setting detail: THERAPIES SERIES
Discharge: HOME | End: 2023-03-27
Payer: MEDICARE

## 2023-03-27 VITALS
BODY MASS INDEX: 45.39 KG/M2 | DIASTOLIC BLOOD PRESSURE: 77 MMHG | OXYGEN SATURATION: 94 % | WEIGHT: 289.2 LBS | HEIGHT: 67 IN | TEMPERATURE: 97 F | HEART RATE: 99 BPM | SYSTOLIC BLOOD PRESSURE: 119 MMHG

## 2023-03-27 DIAGNOSIS — R41.89 COGNITIVE IMPAIRMENT: ICD-10-CM

## 2023-03-27 DIAGNOSIS — F32.A DEPRESSION, UNSPECIFIED DEPRESSION TYPE: Primary | ICD-10-CM

## 2023-03-27 DIAGNOSIS — F41.9 ANXIETY DISORDER, UNSPECIFIED TYPE: ICD-10-CM

## 2023-03-27 DIAGNOSIS — G47.00 INSOMNIA, UNSPECIFIED TYPE: ICD-10-CM

## 2023-03-27 PROCEDURE — 97130 THER IVNTJ EA ADDL 15 MIN: CPT

## 2023-03-27 PROCEDURE — 97129 THER IVNTJ 1ST 15 MIN: CPT

## 2023-03-27 ASSESSMENT — PATIENT HEALTH QUESTIONNAIRE - PHQ9
1. LITTLE INTEREST OR PLEASURE IN DOING THINGS: 0
8. MOVING OR SPEAKING SO SLOWLY THAT OTHER PEOPLE COULD HAVE NOTICED. OR THE OPPOSITE, BEING SO FIGETY OR RESTLESS THAT YOU HAVE BEEN MOVING AROUND A LOT MORE THAN USUAL: 1
SUM OF ALL RESPONSES TO PHQ QUESTIONS 1-9: 5
6. FEELING BAD ABOUT YOURSELF - OR THAT YOU ARE A FAILURE OR HAVE LET YOURSELF OR YOUR FAMILY DOWN: 0
10. IF YOU CHECKED OFF ANY PROBLEMS, HOW DIFFICULT HAVE THESE PROBLEMS MADE IT FOR YOU TO DO YOUR WORK, TAKE CARE OF THINGS AT HOME, OR GET ALONG WITH OTHER PEOPLE: 1
SUM OF ALL RESPONSES TO PHQ QUESTIONS 1-9: 5
4. FEELING TIRED OR HAVING LITTLE ENERGY: 1
5. POOR APPETITE OR OVEREATING: 1
SUM OF ALL RESPONSES TO PHQ QUESTIONS 1-9: 5
SUM OF ALL RESPONSES TO PHQ9 QUESTIONS 1 & 2: 0
9. THOUGHTS THAT YOU WOULD BE BETTER OFF DEAD, OR OF HURTING YOURSELF: 0
2. FEELING DOWN, DEPRESSED OR HOPELESS: 0
SUM OF ALL RESPONSES TO PHQ QUESTIONS 1-9: 5
7. TROUBLE CONCENTRATING ON THINGS, SUCH AS READING THE NEWSPAPER OR WATCHING TELEVISION: 1
3. TROUBLE FALLING OR STAYING ASLEEP: 1

## 2023-03-27 NOTE — PROGRESS NOTES
3/27/2023 8:35 AM   Progress Note          Violeta David Mantle 1960      Chief Complaint   Patient presents with    Medication Check           Subjective:    44-year-old white female with history of depression, brain hemorrhage s/p post fall in 2009, diabetes, thyroid dysfunction, presents for follow up. MRI in 2009 showed evidence of microvascular ischemia. Recent slums score 17 out of 30. Cymbalta was increased last time. On melatonin. Sleep study showed mild sleep apnea. CPAP was not recommended. She comes with her daughter. Patient is bright and talkative. Joking at times. Daughter notes significant improvement in the mood. States patient has been more social.  Got a new puppy. Walks the puppy in the yard. Sleeping better. Eating ok. Occasionally paranoid about money and mail. No aggression. In speech therapy. Planning a road trip with her brother.       BP: /77   Pulse 99   Temp 97 °F (36.1 °C)   Ht 5' 7\" (1.702 m)   Wt 289 lb 3.2 oz (131.2 kg)   SpO2 94%   BMI 45.30 kg/m²       Review of Systems - 14 point review:  Negative except for    Constitutional: (fevers, chills, night sweats, wt loss/gain, change in appetite, fatigue, somnolence)    HEENT: (ear pain or discharge, hearing loss, ear ringing, sinus pressure, nosebleed, nasal discharge, sore throat, oral sores, tooth pain, bleeding gums, hoarse voice, neck pain)      Cardiovascular: (HTN, chest pain, elevated cholesterol/lipids, palpitations, leg swelling, leg pain with walking)    Respiratory: (cough, wheezing, snoring, SOB with activity (dyspnea), SOB while lying flat (orthopnea), awakening with severe SOB (paroxysmal nocturnal dyspnea))    Gastrointestinal: (NVD, constipation, abdominal pain, bright red stools, black tarry stools, stool incontinence)     Genitourinary:  (pelvic pain, burning or frequency of urination, urinary urgency, blood in urine incomplete bladder emptying, urinary incontinence, STD; MEN: testicular

## 2023-03-27 NOTE — PROGRESS NOTES
memory tasks with 80% accuracy with no-min prompts/cues. Patient will complete executive functioning tasks with 80% accuracy with no-min prompts/cues. Patient will complete attention tasks with 80% accuracy with no-min cues/prompts. Patient will demonstrate understanding internal and external memory strategies through verbal production of a minimum of 3 of each target over two consecutive sessions. Patient will demonstrate understanding of external supports by verbally providing 3 independently. Patient/family will demonstrate understanding of HEP. Plan:  Continued cognitive treatment with goals per plan of care.       Electronically signed by ZACK Finn on 3/27/2023 at 11:17 AM

## 2023-04-03 ENCOUNTER — HOSPITAL ENCOUNTER (OUTPATIENT)
Dept: SPEECH THERAPY | Age: 63
Setting detail: THERAPIES SERIES
Discharge: HOME | End: 2023-04-03
Payer: MEDICARE

## 2023-04-03 PROCEDURE — 97130 THER IVNTJ EA ADDL 15 MIN: CPT

## 2023-04-03 PROCEDURE — 97129 THER IVNTJ 1ST 15 MIN: CPT

## 2023-04-03 NOTE — PROGRESS NOTES
will complete executive functioning tasks with 80% accuracy with no-min prompts/cues. Patient will complete attention tasks with 80% accuracy with no-min cues/prompts. Patient will demonstrate understanding internal and external memory strategies through verbal production of a minimum of 3 of each target over two consecutive sessions. Patient will demonstrate understanding of external supports by verbally providing 3 independently. Patient/family will demonstrate understanding of HEP. Plan:  Continued cognitive treatment with goals per plan of care.     Electronically signed by ZACK Marcus on 4/3/2023 at 11:22 AM

## 2023-04-04 RX ORDER — DULOXETIN HYDROCHLORIDE 60 MG/1
60 CAPSULE, DELAYED RELEASE ORAL DAILY
Qty: 90 CAPSULE | Refills: 3 | Status: SHIPPED | OUTPATIENT
Start: 2023-04-04 | End: 2023-07-03

## 2023-04-10 ENCOUNTER — APPOINTMENT (OUTPATIENT)
Dept: SPEECH THERAPY | Age: 63
End: 2023-04-10
Payer: MEDICARE

## 2023-04-17 ENCOUNTER — APPOINTMENT (OUTPATIENT)
Dept: SPEECH THERAPY | Age: 63
End: 2023-04-17
Payer: MEDICARE

## 2023-04-24 RX ORDER — BLOOD-GLUCOSE,RECEIVER,CONT
1 EACH MISCELLANEOUS DAILY
Qty: 1 EACH | Refills: 0 | Status: SHIPPED | OUTPATIENT
Start: 2023-04-24

## 2023-04-24 RX ORDER — BLOOD-GLUCOSE SENSOR
1 EACH MISCELLANEOUS DAILY
Qty: 1 EACH | Refills: 0 | Status: SHIPPED | OUTPATIENT
Start: 2023-04-24

## 2023-05-04 ENCOUNTER — OFFICE VISIT (OUTPATIENT)
Dept: NEUROLOGY | Age: 63
End: 2023-05-04
Payer: MEDICARE

## 2023-05-04 VITALS
DIASTOLIC BLOOD PRESSURE: 95 MMHG | HEART RATE: 95 BPM | HEIGHT: 67 IN | SYSTOLIC BLOOD PRESSURE: 135 MMHG | BODY MASS INDEX: 43.63 KG/M2 | WEIGHT: 278 LBS

## 2023-05-04 DIAGNOSIS — Z86.39 HISTORY OF DIABETES MELLITUS: ICD-10-CM

## 2023-05-04 DIAGNOSIS — E66.01 MORBID OBESITY (HCC): ICD-10-CM

## 2023-05-04 DIAGNOSIS — R41.3 MEMORY LOSS: Primary | ICD-10-CM

## 2023-05-04 PROCEDURE — 3080F DIAST BP >= 90 MM HG: CPT | Performed by: PSYCHIATRY & NEUROLOGY

## 2023-05-04 PROCEDURE — 3075F SYST BP GE 130 - 139MM HG: CPT | Performed by: PSYCHIATRY & NEUROLOGY

## 2023-05-04 PROCEDURE — 99214 OFFICE O/P EST MOD 30 MIN: CPT | Performed by: PSYCHIATRY & NEUROLOGY

## 2023-05-04 NOTE — PROGRESS NOTES
REVIEW OF SYSTEMS    Constitutional: []Fever []Sweat []Chills [] Recent Injury [x] Denies all unless marked  HEENT:[]Headache  [] Head Injury/Hearing Loss  [] Sore Throat  [] Ear Ache/Dizziness  [x] Denies all unless marked  Spine:  [] Neck pain  [] Back pain  [] Sciaticia  [x] Denies all unless marked  Cardiovascular:[]Heart Disease []Chest Pain [] Palpitations  [x] Denies all unless marked  Pulmonary: []Shortness of Breath []Cough   [x] Denies all unless marke  Gastrointestinal: []Nausea  []Vomiting  []Abdominal Pain  []Constipation  []Diarrhea  []Dark Bloody Stools  [x] Denies all unless marked  Psychiatric/Behavioral:[] Depression [] Anxiety [x] Denies all unless marked  Genitourinary:   [] Frequency  [] Urgency  [] Incontinence [] Pain with Urination  [x] Denies all unless marked  Extremities: []Pain  []Swelling  [x] Denies all unless marked  Musculoskeletal: [] Muscle Pain  [] Joint Pain  [] Arthritis [] Muscle Cramps [] Muscle Twitches  [x] Denies all unless marked  Sleep: [] Insomnia [] Snoring [] Restless Legs [] Sleep Apnea  [] Daytime Sleepiness  [x] Denies all unless marked  Skin:[] Rash [] Skin Discoloration [x] Denies all unless marked   Neurological: [x]Visual Disturbance/Memory Loss [] Loss of Balance [] Slurred Speech/Weakness [] Seizures  [] Vertigo/Dizziness [x] Denies all unless marked
blood glucose test strips (AGAMATRIX PRESTO TEST) strip Test blood sugar 3 times daily. Dx diabetes 100 each 3       BP (!) 135/95   Pulse 95   Ht 5' 7\" (1.702 m)   Wt 278 lb (126.1 kg)   BMI 43.54 kg/m²       Constitutional - well developed, well nourished. Eyes - conjunctiva normal.  Pupils react to light  Ear, nose, throat -hearing intact to finger rub No scars, masses, or lesions over external nose or ears, no atrophy of tongue  Neck-symmetric, no masses noted, no jugular vein distension. No bruits noted. Respiration- chest wall appears symmetric, good expansion,   normal effort without use of accessory muscles  Cardiovascular- RRR  Musculoskeletal - no significant wasting of muscles noted, no bony deformities, gait no gross ataxia  Extremities-no clubbing, cyanosis or edema  Skin - warm, dry, and intact. No rash, erythema, or pallor. Psychiatric - mood, affect, and behavior appear normal.      Neurological exam  Awake, alert, fluent oriented for the most part although she said it was June rather than May. Refuses to engage in questions for the most part. Actually got up and walked out of the room while I was still talking to her daughter. Follows complex commands. Gives poor effort on numerous questions. Attention and concentration appear appropriate  Speech normal without dysarthria  No clear issues with language of fund of knowledge    Cranial Nerve Exam   CN II- Visual fields grossly unremarkable. VA adequate.    CN III, IV,VI- PERRLA, EOMI, No nystagmus, conjugate eye movements, no ptosis  CN VII-no facial asymmetry  CN VIII-Hearing intact   CN IX and X- Palate elevates in midline  CN XI-good shoulder shrug  CN XII-Tongue midline with no fasciculations or fibrillations    Motor Exam  V/V throughout upper and lower extremities bilaterally, no cogwheeling, normal tone      Reflexes   2+ biceps bilaterally  2+ brachioradialis  2+ triceps  2+patella    Tremors- no tremors in hands or head

## 2023-05-09 RX ORDER — BLOOD-GLUCOSE SENSOR
EACH MISCELLANEOUS
Qty: 1 EACH | Refills: 0 | OUTPATIENT
Start: 2023-05-09

## 2023-05-09 RX ORDER — BLOOD-GLUCOSE SENSOR
EACH MISCELLANEOUS
Qty: 1 EACH | Refills: 0 | Status: SHIPPED | OUTPATIENT
Start: 2023-05-09

## 2023-05-19 ENCOUNTER — OFFICE VISIT (OUTPATIENT)
Dept: INTERNAL MEDICINE | Age: 63
End: 2023-05-19
Payer: MEDICARE

## 2023-05-19 VITALS
BODY MASS INDEX: 44.89 KG/M2 | WEIGHT: 286 LBS | OXYGEN SATURATION: 96 % | SYSTOLIC BLOOD PRESSURE: 122 MMHG | DIASTOLIC BLOOD PRESSURE: 74 MMHG | HEART RATE: 92 BPM | HEIGHT: 67 IN

## 2023-05-19 DIAGNOSIS — E11.65 UNCONTROLLED TYPE 2 DIABETES MELLITUS WITH HYPERGLYCEMIA (HCC): ICD-10-CM

## 2023-05-19 DIAGNOSIS — E11.65 UNCONTROLLED TYPE 2 DIABETES MELLITUS WITH HYPERGLYCEMIA (HCC): Primary | ICD-10-CM

## 2023-05-19 DIAGNOSIS — E03.9 ACQUIRED HYPOTHYROIDISM: ICD-10-CM

## 2023-05-19 DIAGNOSIS — E78.2 MIXED HYPERLIPIDEMIA: ICD-10-CM

## 2023-05-19 DIAGNOSIS — E66.01 MORBID OBESITY DUE TO EXCESS CALORIES (HCC): ICD-10-CM

## 2023-05-19 DIAGNOSIS — F09 COGNITIVE DYSFUNCTION: ICD-10-CM

## 2023-05-19 DIAGNOSIS — I10 ESSENTIAL HYPERTENSION: ICD-10-CM

## 2023-05-19 DIAGNOSIS — E55.9 VITAMIN D DEFICIENCY: ICD-10-CM

## 2023-05-19 DIAGNOSIS — M15.9 GENERALIZED OSTEOARTHRITIS: ICD-10-CM

## 2023-05-19 DIAGNOSIS — Z87.820 HISTORY OF CLOSED HEAD INJURY: ICD-10-CM

## 2023-05-19 LAB
ALBUMIN SERPL-MCNC: 4.1 G/DL (ref 3.5–5.2)
ALP SERPL-CCNC: 121 U/L (ref 35–104)
ALT SERPL-CCNC: 12 U/L (ref 5–33)
ANION GAP SERPL CALCULATED.3IONS-SCNC: 9 MMOL/L (ref 7–19)
AST SERPL-CCNC: 19 U/L (ref 5–32)
BILIRUB SERPL-MCNC: 0.5 MG/DL (ref 0.2–1.2)
BUN SERPL-MCNC: 14 MG/DL (ref 8–23)
CALCIUM SERPL-MCNC: 9.4 MG/DL (ref 8.8–10.2)
CHLORIDE SERPL-SCNC: 105 MMOL/L (ref 98–111)
CHOLEST SERPL-MCNC: 162 MG/DL (ref 160–199)
CO2 SERPL-SCNC: 29 MMOL/L (ref 22–29)
CREAT SERPL-MCNC: 0.5 MG/DL (ref 0.5–0.9)
CREAT UR-MCNC: 226.7 MG/DL (ref 4.2–622)
ERYTHROCYTE [DISTWIDTH] IN BLOOD BY AUTOMATED COUNT: 13.3 % (ref 11.5–14.5)
GLUCOSE SERPL-MCNC: 129 MG/DL (ref 74–109)
HBA1C MFR BLD: 9 % (ref 4–6)
HCT VFR BLD AUTO: 46.3 % (ref 37–47)
HDLC SERPL-MCNC: 66 MG/DL (ref 65–121)
HGB BLD-MCNC: 15 G/DL (ref 12–16)
LDLC SERPL CALC-MCNC: 78 MG/DL
MCH RBC QN AUTO: 29.9 PG (ref 27–31)
MCHC RBC AUTO-ENTMCNC: 32.4 G/DL (ref 33–37)
MCV RBC AUTO: 92.2 FL (ref 81–99)
MICROALBUMIN UR-MCNC: 2.4 MG/DL (ref 0–19)
MICROALBUMIN/CREAT UR-RTO: 10.6 MG/G
PLATELET # BLD AUTO: 333 K/UL (ref 130–400)
PMV BLD AUTO: 10.4 FL (ref 9.4–12.3)
POTASSIUM SERPL-SCNC: 4 MMOL/L (ref 3.5–5)
PROT SERPL-MCNC: 7.1 G/DL (ref 6.6–8.7)
RBC # BLD AUTO: 5.02 M/UL (ref 4.2–5.4)
SODIUM SERPL-SCNC: 143 MMOL/L (ref 136–145)
TRIGL SERPL-MCNC: 92 MG/DL (ref 0–149)
TSH SERPL DL<=0.005 MIU/L-ACNC: 0.09 UIU/ML (ref 0.27–4.2)
WBC # BLD AUTO: 7.9 K/UL (ref 4.8–10.8)

## 2023-05-19 PROCEDURE — 3074F SYST BP LT 130 MM HG: CPT | Performed by: INTERNAL MEDICINE

## 2023-05-19 PROCEDURE — 3052F HG A1C>EQUAL 8.0%<EQUAL 9.0%: CPT | Performed by: INTERNAL MEDICINE

## 2023-05-19 PROCEDURE — 3078F DIAST BP <80 MM HG: CPT | Performed by: INTERNAL MEDICINE

## 2023-05-19 PROCEDURE — 99214 OFFICE O/P EST MOD 30 MIN: CPT | Performed by: INTERNAL MEDICINE

## 2023-05-19 RX ORDER — BLOOD-GLUCOSE SENSOR
EACH MISCELLANEOUS
Qty: 3 EACH | Refills: 11 | Status: SHIPPED | OUTPATIENT
Start: 2023-05-19

## 2023-05-19 SDOH — ECONOMIC STABILITY: HOUSING INSECURITY
IN THE LAST 12 MONTHS, WAS THERE A TIME WHEN YOU DID NOT HAVE A STEADY PLACE TO SLEEP OR SLEPT IN A SHELTER (INCLUDING NOW)?: NO

## 2023-05-19 SDOH — ECONOMIC STABILITY: FOOD INSECURITY: WITHIN THE PAST 12 MONTHS, THE FOOD YOU BOUGHT JUST DIDN'T LAST AND YOU DIDN'T HAVE MONEY TO GET MORE.: NEVER TRUE

## 2023-05-19 SDOH — ECONOMIC STABILITY: INCOME INSECURITY: HOW HARD IS IT FOR YOU TO PAY FOR THE VERY BASICS LIKE FOOD, HOUSING, MEDICAL CARE, AND HEATING?: SOMEWHAT HARD

## 2023-05-19 SDOH — ECONOMIC STABILITY: FOOD INSECURITY: WITHIN THE PAST 12 MONTHS, YOU WORRIED THAT YOUR FOOD WOULD RUN OUT BEFORE YOU GOT MONEY TO BUY MORE.: NEVER TRUE

## 2023-05-22 DIAGNOSIS — E11.65 UNCONTROLLED TYPE 2 DIABETES MELLITUS WITH HYPERGLYCEMIA (HCC): Primary | ICD-10-CM

## 2023-05-22 DIAGNOSIS — Z79.4 TYPE 2 DIABETES MELLITUS WITHOUT COMPLICATION, WITH LONG-TERM CURRENT USE OF INSULIN (HCC): ICD-10-CM

## 2023-05-22 DIAGNOSIS — E11.9 TYPE 2 DIABETES MELLITUS WITHOUT COMPLICATION, WITH LONG-TERM CURRENT USE OF INSULIN (HCC): ICD-10-CM

## 2023-05-27 DIAGNOSIS — M15.9 GENERALIZED OSTEOARTHRITIS: ICD-10-CM

## 2023-05-30 RX ORDER — MELOXICAM 15 MG/1
TABLET ORAL
Qty: 90 TABLET | Refills: 1 | Status: SHIPPED | OUTPATIENT
Start: 2023-05-30

## 2023-06-26 ENCOUNTER — TELEPHONE (OUTPATIENT)
Dept: PSYCHIATRY | Age: 63
End: 2023-06-26

## 2023-06-27 ENCOUNTER — TELEPHONE (OUTPATIENT)
Dept: PSYCHIATRY | Age: 63
End: 2023-06-27

## 2023-07-18 DIAGNOSIS — E11.65 UNCONTROLLED TYPE 2 DIABETES MELLITUS WITH HYPERGLYCEMIA (HCC): ICD-10-CM

## 2023-07-18 RX ORDER — PROCHLORPERAZINE 25 MG/1
SUPPOSITORY RECTAL
Qty: 3 EACH | Refills: 3 | Status: SHIPPED | OUTPATIENT
Start: 2023-07-18

## 2023-07-18 RX ORDER — INSULIN LISPRO 100 [IU]/ML
INJECTION, SOLUTION INTRAVENOUS; SUBCUTANEOUS
Qty: 15 ML | Refills: 3 | Status: SHIPPED | OUTPATIENT
Start: 2023-07-18

## 2023-09-08 ENCOUNTER — OFFICE VISIT (OUTPATIENT)
Dept: INTERNAL MEDICINE | Age: 63
End: 2023-09-08
Payer: MEDICARE

## 2023-09-08 VITALS
OXYGEN SATURATION: 97 % | WEIGHT: 277 LBS | RESPIRATION RATE: 20 BRPM | BODY MASS INDEX: 43.47 KG/M2 | HEART RATE: 102 BPM | DIASTOLIC BLOOD PRESSURE: 88 MMHG | HEIGHT: 67 IN | SYSTOLIC BLOOD PRESSURE: 128 MMHG

## 2023-09-08 DIAGNOSIS — E66.01 MORBID OBESITY DUE TO EXCESS CALORIES (HCC): ICD-10-CM

## 2023-09-08 DIAGNOSIS — Z91.199 NONCOMPLIANCE: ICD-10-CM

## 2023-09-08 DIAGNOSIS — F51.01 PRIMARY INSOMNIA: ICD-10-CM

## 2023-09-08 DIAGNOSIS — Z00.00 MEDICARE ANNUAL WELLNESS VISIT, SUBSEQUENT: Primary | ICD-10-CM

## 2023-09-08 DIAGNOSIS — M15.9 GENERALIZED OSTEOARTHRITIS: ICD-10-CM

## 2023-09-08 DIAGNOSIS — I10 ESSENTIAL HYPERTENSION: ICD-10-CM

## 2023-09-08 DIAGNOSIS — F33.0 MILD EPISODE OF RECURRENT MAJOR DEPRESSIVE DISORDER (HCC): ICD-10-CM

## 2023-09-08 DIAGNOSIS — E11.65 UNCONTROLLED TYPE 2 DIABETES MELLITUS WITH HYPERGLYCEMIA (HCC): ICD-10-CM

## 2023-09-08 DIAGNOSIS — E78.2 MIXED HYPERLIPIDEMIA: ICD-10-CM

## 2023-09-08 DIAGNOSIS — F09 COGNITIVE DYSFUNCTION: ICD-10-CM

## 2023-09-08 DIAGNOSIS — E55.9 VITAMIN D DEFICIENCY: ICD-10-CM

## 2023-09-08 DIAGNOSIS — Z87.820 HISTORY OF CLOSED HEAD INJURY: ICD-10-CM

## 2023-09-08 LAB
25(OH)D3 SERPL-MCNC: 13.5 NG/ML
ALBUMIN SERPL-MCNC: 4.1 G/DL (ref 3.5–5.2)
ALP SERPL-CCNC: 116 U/L (ref 35–104)
ALT SERPL-CCNC: 15 U/L (ref 5–33)
ANION GAP SERPL CALCULATED.3IONS-SCNC: 11 MMOL/L (ref 7–19)
AST SERPL-CCNC: 17 U/L (ref 5–32)
BILIRUB SERPL-MCNC: 0.4 MG/DL (ref 0.2–1.2)
BUN SERPL-MCNC: 10 MG/DL (ref 8–23)
CALCIUM SERPL-MCNC: 8.8 MG/DL (ref 8.8–10.2)
CHLORIDE SERPL-SCNC: 103 MMOL/L (ref 98–111)
CO2 SERPL-SCNC: 28 MMOL/L (ref 22–29)
CREAT SERPL-MCNC: 0.6 MG/DL (ref 0.5–0.9)
CREAT UR-MCNC: 102.4 MG/DL (ref 28–217)
ERYTHROCYTE [DISTWIDTH] IN BLOOD BY AUTOMATED COUNT: 13 % (ref 11.5–14.5)
GLUCOSE SERPL-MCNC: 331 MG/DL (ref 74–109)
HBA1C MFR BLD: 9.2 % (ref 4–6)
HCT VFR BLD AUTO: 44.2 % (ref 37–47)
HGB BLD-MCNC: 14.5 G/DL (ref 12–16)
MCH RBC QN AUTO: 30 PG (ref 27–31)
MCHC RBC AUTO-ENTMCNC: 32.8 G/DL (ref 33–37)
MCV RBC AUTO: 91.3 FL (ref 81–99)
MICROALBUMIN UR-MCNC: <1.2 MG/DL (ref 0–19)
MICROALBUMIN/CREAT UR-RTO: NORMAL MG/G
PLATELET # BLD AUTO: 353 K/UL (ref 130–400)
PMV BLD AUTO: 9.9 FL (ref 9.4–12.3)
POTASSIUM SERPL-SCNC: 3.8 MMOL/L (ref 3.5–5)
PROT SERPL-MCNC: 7.1 G/DL (ref 6.6–8.7)
RBC # BLD AUTO: 4.84 M/UL (ref 4.2–5.4)
SODIUM SERPL-SCNC: 142 MMOL/L (ref 136–145)
TSH SERPL DL<=0.005 MIU/L-ACNC: 0.34 UIU/ML (ref 0.27–4.2)
WBC # BLD AUTO: 8.7 K/UL (ref 4.8–10.8)

## 2023-09-08 PROCEDURE — 3078F DIAST BP <80 MM HG: CPT | Performed by: INTERNAL MEDICINE

## 2023-09-08 PROCEDURE — 99213 OFFICE O/P EST LOW 20 MIN: CPT | Performed by: INTERNAL MEDICINE

## 2023-09-08 PROCEDURE — G0439 PPPS, SUBSEQ VISIT: HCPCS | Performed by: INTERNAL MEDICINE

## 2023-09-08 PROCEDURE — 3046F HEMOGLOBIN A1C LEVEL >9.0%: CPT | Performed by: INTERNAL MEDICINE

## 2023-09-08 PROCEDURE — 3074F SYST BP LT 130 MM HG: CPT | Performed by: INTERNAL MEDICINE

## 2023-09-08 ASSESSMENT — PATIENT HEALTH QUESTIONNAIRE - PHQ9
10. IF YOU CHECKED OFF ANY PROBLEMS, HOW DIFFICULT HAVE THESE PROBLEMS MADE IT FOR YOU TO DO YOUR WORK, TAKE CARE OF THINGS AT HOME, OR GET ALONG WITH OTHER PEOPLE: 0
2. FEELING DOWN, DEPRESSED OR HOPELESS: 0
8. MOVING OR SPEAKING SO SLOWLY THAT OTHER PEOPLE COULD HAVE NOTICED. OR THE OPPOSITE, BEING SO FIGETY OR RESTLESS THAT YOU HAVE BEEN MOVING AROUND A LOT MORE THAN USUAL: 0
5. POOR APPETITE OR OVEREATING: 0
9. THOUGHTS THAT YOU WOULD BE BETTER OFF DEAD, OR OF HURTING YOURSELF: 0
SUM OF ALL RESPONSES TO PHQ9 QUESTIONS 1 & 2: 0
1. LITTLE INTEREST OR PLEASURE IN DOING THINGS: 0
4. FEELING TIRED OR HAVING LITTLE ENERGY: 0
SUM OF ALL RESPONSES TO PHQ QUESTIONS 1-9: 0
6. FEELING BAD ABOUT YOURSELF - OR THAT YOU ARE A FAILURE OR HAVE LET YOURSELF OR YOUR FAMILY DOWN: 0
SUM OF ALL RESPONSES TO PHQ QUESTIONS 1-9: 0
SUM OF ALL RESPONSES TO PHQ QUESTIONS 1-9: 0
3. TROUBLE FALLING OR STAYING ASLEEP: 0
7. TROUBLE CONCENTRATING ON THINGS, SUCH AS READING THE NEWSPAPER OR WATCHING TELEVISION: 0
SUM OF ALL RESPONSES TO PHQ QUESTIONS 1-9: 0
SUM OF ALL RESPONSES TO PHQ QUESTIONS 1-9: 0
2. FEELING DOWN, DEPRESSED OR HOPELESS: 0
SUM OF ALL RESPONSES TO PHQ QUESTIONS 1-9: 0
1. LITTLE INTEREST OR PLEASURE IN DOING THINGS: 0
SUM OF ALL RESPONSES TO PHQ9 QUESTIONS 1 & 2: 0
SUM OF ALL RESPONSES TO PHQ QUESTIONS 1-9: 0
SUM OF ALL RESPONSES TO PHQ QUESTIONS 1-9: 0

## 2023-09-08 ASSESSMENT — LIFESTYLE VARIABLES
HOW MANY STANDARD DRINKS CONTAINING ALCOHOL DO YOU HAVE ON A TYPICAL DAY: 1 OR 2
HOW OFTEN DO YOU HAVE A DRINK CONTAINING ALCOHOL: MONTHLY OR LESS

## 2023-09-08 NOTE — PROGRESS NOTES
Chief Complaint   Patient presents with    Medicare AWV       HPI: Here today for Medicare annual wellness visit and to follow-up diabetes hypertension hyperlipidemia arthritis cognitive dysfunction and depression history of closed head injury. Mood cognition and affect all seem better with some of the changes her family has helped her make him with their support. I think in general she is doing better. She does not have any specific new complaint.     Past Medical History:   Diagnosis Date    Acquired hypothyroidism     Diabetes ([address])     Episode of recurrent major depressive disorder ([address]) 1/10/2021    Generalized osteoarthritis 3/21/2018    Hypothyroidism     Morbid obesity due to excess calories ([address]) 6/24/2018    Movement disorder     joint pain    Type 2 diabetes mellitus without complication, with long-term current use of insulin (HCC)        Past Surgical History:   Procedure Laterality Date    CHOLECYSTECTOMY      TUBAL LIGATION         Family History   Problem Relation Age of Onset    Diabetes Mother     Bipolar Disorder Mother        Social History     Socioeconomic History    Marital status:      Spouse name: Not on file    Number of children: Not on file    Years of education: Not on file    Highest education level: Not on file   Occupational History    Not on file   Tobacco Use    Smoking status: Never    Smokeless tobacco: Never   Vaping Use    Vaping Use: Never used   Substance and Sexual Activity    Alcohol use: No    Drug use: No    Sexual activity: Not Currently     Partners: Male   Other Topics Concern    Not on file   Social History Narrative    Not on file     Social Determinants of Health     Financial Resource Strain: Medium Risk (5/19/2023)    Overall Financial Resource Strain (CARDIA)     Difficulty of Paying Living Expenses: Somewhat hard   Food Insecurity: No Food Insecurity (5/19/2023)    Hunger Vital Sign     Worried About Running Out of Food in the Last Year: Never true

## 2023-09-18 PROBLEM — L02.214 ABSCESS OF RIGHT GROIN: Status: RESOLVED | Noted: 2020-12-20 | Resolved: 2023-09-18

## 2023-09-18 ASSESSMENT — ENCOUNTER SYMPTOMS
EYE DISCHARGE: 0
BACK PAIN: 0
EYE REDNESS: 0
SINUS PRESSURE: 0
COUGH: 0
ABDOMINAL PAIN: 0
SHORTNESS OF BREATH: 0
ABDOMINAL DISTENTION: 0

## 2023-09-20 RX ORDER — ERGOCALCIFEROL 1.25 MG/1
50000 CAPSULE ORAL WEEKLY
Qty: 12 CAPSULE | Refills: 1 | Status: SHIPPED | OUTPATIENT
Start: 2023-09-20

## 2023-09-20 NOTE — PROGRESS NOTES
Please see my other message about her blood sugar not being in control also her vitamin D is too low she needs to go back on vitamin D 50,000 units weekly.

## 2023-09-26 ENCOUNTER — TELEPHONE (OUTPATIENT)
Dept: INTERNAL MEDICINE | Age: 63
End: 2023-09-26

## 2023-09-26 NOTE — TELEPHONE ENCOUNTER
She is out of Toujeo and patient asst. Will not be delivered until at least a week or two. She is asking if there is something else comparable that we have samples of such as ozemic or tresiba? We do not have any Toujeo in office.

## 2023-09-27 NOTE — TELEPHONE ENCOUNTER
We can substitute the same dose of tresiba - she should be taking ozempic too (?) she is no toujeo (can sub tresiba) and she is on ozempic

## 2023-10-02 ENCOUNTER — TELEPHONE (OUTPATIENT)
Dept: INTERNAL MEDICINE | Age: 63
End: 2023-10-02

## 2023-10-02 NOTE — TELEPHONE ENCOUNTER
----- Message from Jcarlos Boucher sent at 10/2/2023  1:06 PM CDT -----  Subject: Message to Provider    QUESTIONS  Information for Provider? Pt would like to know if Dr. Lary Figueredo is set to   retire in the near future? If so, who would be taking her pt's? At the   time of this conversation, verbal approval was given for pt's daughter   Sherryle Khat to speak on her behalf.  ---------------------------------------------------------------------------  --------------  Stefany [place] Meenakshi  [phone]; Do not leave any message, patient will call back for answer  ---------------------------------------------------------------------------  --------------  SCRIPT ANSWERS  Relationship to Patient? Other/Third Party  Representative Name? Chasity  Is the representative on the Communication Release of Information (ALEX)   form in Epic?  Yes

## 2023-10-11 DIAGNOSIS — E11.65 UNCONTROLLED TYPE 2 DIABETES MELLITUS WITH HYPERGLYCEMIA (HCC): ICD-10-CM

## 2023-10-11 DIAGNOSIS — E03.9 ACQUIRED HYPOTHYROIDISM: ICD-10-CM

## 2023-10-11 RX ORDER — LEVOTHYROXINE SODIUM 0.2 MG/1
200 TABLET ORAL DAILY
Qty: 90 TABLET | Refills: 1 | Status: SHIPPED | OUTPATIENT
Start: 2023-10-11

## 2023-10-11 RX ORDER — SIMVASTATIN 80 MG
TABLET ORAL
Qty: 90 TABLET | Refills: 3 | Status: SHIPPED | OUTPATIENT
Start: 2023-10-11

## 2023-10-11 RX ORDER — PROCHLORPERAZINE 25 MG/1
SUPPOSITORY RECTAL
Qty: 3 EACH | Refills: 3 | Status: SHIPPED | OUTPATIENT
Start: 2023-10-11

## 2023-11-20 DIAGNOSIS — Z79.4 TYPE 2 DIABETES MELLITUS WITHOUT COMPLICATION, WITH LONG-TERM CURRENT USE OF INSULIN (HCC): ICD-10-CM

## 2023-11-20 DIAGNOSIS — E11.9 TYPE 2 DIABETES MELLITUS WITHOUT COMPLICATION, WITH LONG-TERM CURRENT USE OF INSULIN (HCC): ICD-10-CM

## 2023-11-20 DIAGNOSIS — E11.65 UNCONTROLLED TYPE 2 DIABETES MELLITUS WITH HYPERGLYCEMIA (HCC): ICD-10-CM

## 2023-12-31 DIAGNOSIS — M15.9 GENERALIZED OSTEOARTHRITIS: ICD-10-CM

## 2024-01-02 RX ORDER — MELOXICAM 15 MG/1
TABLET ORAL
Qty: 90 TABLET | Refills: 1 | Status: SHIPPED | OUTPATIENT
Start: 2024-01-02

## 2024-01-05 DIAGNOSIS — E11.65 UNCONTROLLED TYPE 2 DIABETES MELLITUS WITH HYPERGLYCEMIA (HCC): Primary | ICD-10-CM

## 2024-02-27 DIAGNOSIS — E11.65 UNCONTROLLED TYPE 2 DIABETES MELLITUS WITH HYPERGLYCEMIA (HCC): ICD-10-CM

## 2024-02-27 LAB
ALBUMIN SERPL-MCNC: 4.3 G/DL (ref 3.5–5.2)
ALP SERPL-CCNC: 114 U/L (ref 35–104)
ALT SERPL-CCNC: 14 U/L (ref 5–33)
ANION GAP SERPL CALCULATED.3IONS-SCNC: 16 MMOL/L (ref 7–19)
AST SERPL-CCNC: 20 U/L (ref 5–32)
BILIRUB SERPL-MCNC: 0.5 MG/DL (ref 0.2–1.2)
BUN SERPL-MCNC: 17 MG/DL (ref 8–23)
CALCIUM SERPL-MCNC: 9.1 MG/DL (ref 8.8–10.2)
CHLORIDE SERPL-SCNC: 102 MMOL/L (ref 98–111)
CHOLEST SERPL-MCNC: 179 MG/DL (ref 160–199)
CO2 SERPL-SCNC: 26 MMOL/L (ref 22–29)
CREAT SERPL-MCNC: 0.7 MG/DL (ref 0.5–0.9)
ERYTHROCYTE [DISTWIDTH] IN BLOOD BY AUTOMATED COUNT: 12.8 % (ref 11.5–14.5)
GLUCOSE SERPL-MCNC: 130 MG/DL (ref 74–109)
HBA1C MFR BLD: 7.8 % (ref 4–6)
HCT VFR BLD AUTO: 44 % (ref 37–47)
HDLC SERPL-MCNC: 67 MG/DL (ref 65–121)
HGB BLD-MCNC: 14.6 G/DL (ref 12–16)
LDLC SERPL CALC-MCNC: 95 MG/DL
MCH RBC QN AUTO: 30.9 PG (ref 27–31)
MCHC RBC AUTO-ENTMCNC: 33.2 G/DL (ref 33–37)
MCV RBC AUTO: 93.2 FL (ref 81–99)
PLATELET # BLD AUTO: 350 K/UL (ref 130–400)
PMV BLD AUTO: 10.3 FL (ref 9.4–12.3)
POTASSIUM SERPL-SCNC: 4 MMOL/L (ref 3.5–5)
PROT SERPL-MCNC: 7.3 G/DL (ref 6.6–8.7)
RBC # BLD AUTO: 4.72 M/UL (ref 4.2–5.4)
SODIUM SERPL-SCNC: 144 MMOL/L (ref 136–145)
TRIGL SERPL-MCNC: 87 MG/DL (ref 0–149)
TSH SERPL DL<=0.005 MIU/L-ACNC: 0.44 UIU/ML (ref 0.27–4.2)
WBC # BLD AUTO: 8 K/UL (ref 4.8–10.8)

## 2024-03-01 ENCOUNTER — OFFICE VISIT (OUTPATIENT)
Dept: INTERNAL MEDICINE | Age: 64
End: 2024-03-01
Payer: COMMERCIAL

## 2024-03-01 VITALS
OXYGEN SATURATION: 99 % | BODY MASS INDEX: 40.97 KG/M2 | SYSTOLIC BLOOD PRESSURE: 118 MMHG | WEIGHT: 261 LBS | HEIGHT: 67 IN | DIASTOLIC BLOOD PRESSURE: 70 MMHG | HEART RATE: 106 BPM

## 2024-03-01 DIAGNOSIS — Z12.31 SCREENING MAMMOGRAM FOR BREAST CANCER: ICD-10-CM

## 2024-03-01 DIAGNOSIS — R00.0 TACHYCARDIA: ICD-10-CM

## 2024-03-01 DIAGNOSIS — E66.01 OBESITY, CLASS III, BMI 40-49.9 (MORBID OBESITY) (HCC): ICD-10-CM

## 2024-03-01 DIAGNOSIS — E11.65 UNCONTROLLED TYPE 2 DIABETES MELLITUS WITH HYPERGLYCEMIA (HCC): Primary | ICD-10-CM

## 2024-03-01 DIAGNOSIS — R41.3 MEMORY LOSS: ICD-10-CM

## 2024-03-01 DIAGNOSIS — Z87.820 HISTORY OF CLOSED HEAD INJURY: ICD-10-CM

## 2024-03-01 DIAGNOSIS — E55.9 VITAMIN D DEFICIENCY: ICD-10-CM

## 2024-03-01 DIAGNOSIS — I10 ESSENTIAL HYPERTENSION: ICD-10-CM

## 2024-03-01 PROBLEM — F33.9 EPISODE OF RECURRENT MAJOR DEPRESSIVE DISORDER (HCC): Status: RESOLVED | Noted: 2021-01-10 | Resolved: 2024-03-01

## 2024-03-01 PROCEDURE — 3074F SYST BP LT 130 MM HG: CPT | Performed by: INTERNAL MEDICINE

## 2024-03-01 PROCEDURE — 99214 OFFICE O/P EST MOD 30 MIN: CPT | Performed by: INTERNAL MEDICINE

## 2024-03-01 PROCEDURE — 3078F DIAST BP <80 MM HG: CPT | Performed by: INTERNAL MEDICINE

## 2024-03-01 PROCEDURE — 3051F HG A1C>EQUAL 7.0%<8.0%: CPT | Performed by: INTERNAL MEDICINE

## 2024-03-01 ASSESSMENT — PATIENT HEALTH QUESTIONNAIRE - PHQ9
SUM OF ALL RESPONSES TO PHQ QUESTIONS 1-9: 6
SUM OF ALL RESPONSES TO PHQ9 QUESTIONS 1 & 2: 2
SUM OF ALL RESPONSES TO PHQ QUESTIONS 1-9: 6
9. THOUGHTS THAT YOU WOULD BE BETTER OFF DEAD, OR OF HURTING YOURSELF: 0
8. MOVING OR SPEAKING SO SLOWLY THAT OTHER PEOPLE COULD HAVE NOTICED. OR THE OPPOSITE, BEING SO FIGETY OR RESTLESS THAT YOU HAVE BEEN MOVING AROUND A LOT MORE THAN USUAL: 0
3. TROUBLE FALLING OR STAYING ASLEEP: 1
6. FEELING BAD ABOUT YOURSELF - OR THAT YOU ARE A FAILURE OR HAVE LET YOURSELF OR YOUR FAMILY DOWN: 0
1. LITTLE INTEREST OR PLEASURE IN DOING THINGS: 1
7. TROUBLE CONCENTRATING ON THINGS, SUCH AS READING THE NEWSPAPER OR WATCHING TELEVISION: 0
2. FEELING DOWN, DEPRESSED OR HOPELESS: 1
4. FEELING TIRED OR HAVING LITTLE ENERGY: 1
SUM OF ALL RESPONSES TO PHQ QUESTIONS 1-9: 6
10. IF YOU CHECKED OFF ANY PROBLEMS, HOW DIFFICULT HAVE THESE PROBLEMS MADE IT FOR YOU TO DO YOUR WORK, TAKE CARE OF THINGS AT HOME, OR GET ALONG WITH OTHER PEOPLE: 1
5. POOR APPETITE OR OVEREATING: 2
SUM OF ALL RESPONSES TO PHQ QUESTIONS 1-9: 6

## 2024-03-01 NOTE — PROGRESS NOTES
Comprehensive Metabolic Panel   Result Value Ref Range    Sodium 144 136 - 145 mmol/L    Potassium 4.0 3.5 - 5.0 mmol/L    Chloride 102 98 - 111 mmol/L    CO2 26 22 - 29 mmol/L    Anion Gap 16 7 - 19 mmol/L    Glucose 130 (H) 74 - 109 mg/dL    BUN 17 8 - 23 mg/dL    Creatinine 0.7 0.5 - 0.9 mg/dL    Est, Glom Filt Rate >60 >60    Calcium 9.1 8.8 - 10.2 mg/dL    Total Protein 7.3 6.6 - 8.7 g/dL    Albumin 4.3 3.5 - 5.2 g/dL    Total Bilirubin 0.5 0.2 - 1.2 mg/dL    Alkaline Phosphatase 114 (H) 35 - 104 U/L    ALT 14 5 - 33 U/L    AST 20 5 - 32 U/L   CBC   Result Value Ref Range    WBC 8.0 4.8 - 10.8 K/uL    RBC 4.72 4.20 - 5.40 M/uL    Hemoglobin 14.6 12.0 - 16.0 g/dL    Hematocrit 44.0 37.0 - 47.0 %    MCV 93.2 81.0 - 99.0 fL    MCH 30.9 27.0 - 31.0 pg    MCHC 33.2 33.0 - 37.0 g/dL    RDW 12.8 11.5 - 14.5 %    Platelets 350 130 - 400 K/uL    MPV 10.3 9.4 - 12.3 fL       ASSESSMENT/ PLAN:  1. Uncontrolled type 2 diabetes mellitus with hyperglycemia (HCC)  Increase Ozempic to 2 mg every 7 days it is supposed to be this dose but they still have not received it through the assistance program I think that would be greatly beneficial to lower her A1c Ozempic has been a good choice for her for multiple reasons.  Her daughter and I both talked her about needing a schedule being a sleep-wake schedule that matches others getting up getting out being around other people more often  - semaglutide, 2 MG/DOSE, (OZEMPIC) 8 MG/3ML SOPN sc injection; Inject 0.75 mLs into the skin every 7 days  Dispense: 12 mL; Refill: 3  - External Referral To Podiatry  - CBC; Future  - Comprehensive Metabolic Panel; Future  - Hemoglobin A1C; Future  - Microalbumin / Creatinine Urine Ratio; Future    2. Obesity, Class III, BMI 40-49.9 (morbid obesity) (HCC)  Continue to emphasize importance of weight loss and diet increase the GLP-1 therapy    3. Screening mammogram for breast cancer    - CHRI[facility]O DIGITAL SCREEN W OR WO CAD BILATERAL; Future    4.

## 2024-03-05 ENCOUNTER — TELEPHONE (OUTPATIENT)
Dept: INTERNAL MEDICINE | Age: 64
End: 2024-03-05

## 2024-03-05 DIAGNOSIS — E11.65 UNCONTROLLED TYPE 2 DIABETES MELLITUS WITH HYPERGLYCEMIA (HCC): Primary | ICD-10-CM

## 2024-03-05 NOTE — TELEPHONE ENCOUNTER
Calling to check on the referral to Dr. Carr she said they told her they haven't received a referral.

## 2024-03-30 DIAGNOSIS — E03.9 ACQUIRED HYPOTHYROIDISM: ICD-10-CM

## 2024-04-01 RX ORDER — LEVOTHYROXINE SODIUM 0.2 MG/1
200 TABLET ORAL DAILY
Qty: 90 TABLET | Refills: 1 | Status: SHIPPED | OUTPATIENT
Start: 2024-04-01

## 2024-04-10 ENCOUNTER — TELEPHONE (OUTPATIENT)
Dept: INTERNAL MEDICINE | Age: 64
End: 2024-04-10

## 2024-04-10 DIAGNOSIS — T81.30XA WOUND DEHISCENCE: ICD-10-CM

## 2024-04-10 DIAGNOSIS — T81.30XA WOUND DEHISCENCE: Primary | ICD-10-CM

## 2024-04-10 NOTE — TELEPHONE ENCOUNTER
Patient had a prescription for silver sulfadiazine sent to [facility] pharmacy and needs to be sent to [facility]

## 2024-07-03 ENCOUNTER — TELEPHONE (OUTPATIENT)
Dept: PSYCHIATRY | Age: 64
End: 2024-07-03

## 2024-07-03 DIAGNOSIS — M15.9 GENERALIZED OSTEOARTHRITIS: ICD-10-CM

## 2024-07-03 RX ORDER — MELOXICAM 15 MG/1
TABLET ORAL
Qty: 90 TABLET | Refills: 1 | Status: SHIPPED | OUTPATIENT
Start: 2024-07-03

## 2024-08-07 NOTE — PROGRESS NOTES
[facility] - PODIATRY    Today's Date: 08/13/2024     Patient Name: Gia Mcqueen  MRN: [id number]  CSN: [id number]  PCP: Jackie Hoyt MD  Referring Provider: Jackie Hoyt MD    SUBJECTIVE     Chief Complaint   Patient presents with    Establish Care     Jackie Hoyt MD 03/01/2024 Type 2 diabetes mellitus with hyperglycemia. Pt states that her feet have a lot of swelling to the point they discolor. Right foot is worse than the left. No sores on feet, but daughter states if she does get one it takes a while to heal. Pt would like nails trimmed. 0/10 pain    Diabetes     Pts daughter states that her glucose is all over the place. Last time it was checked it was 247 Mg/dL Bg     HPI: Gia Mcqueen, a 64 y.o.female, comes to clinic as a(n) new patient presenting for diabetic foot exam and complaining of toenail/callus issues. Patient has h/o DM2, Obesity, OA, hypothyroid, hypercholesterolemia . Patient is IDDM with last stated BG level of 247mg/dl. Uncertain on amount of numbness to feet. Denies open wounds or sores. Notes that her toenails are long, thick and discolored. She has trouble caring for them herself. Uses a cane for gait support. Denies pain. Denies previous treatment. Denies any constitutional symptoms. No other pedal complaints at this time.    History reviewed. No pertinent past medical history.  History reviewed. No pertinent surgical history.  History reviewed. No pertinent family history.  Social History     Socioeconomic History    Marital status:      No Known Allergies  Current Outpatient Medications   Medication Sig Dispense Refill    Dulaglutide (Trulicity) 0.75 MG/0.5ML solution pen-injector Inject 0.75 mg into the skin once a week 6 mL 3    DULoxetine (CYMBALTA) 30 MG capsule Take 1 capsule by mouth daily 30 capsule 1    HumaLOG KwikPen 100 UNIT/ML solution pen-injector Inject 6 units for blood sugar 250-300, 12 units for blood sugar 300-350, 18 units for blood  sugar over 350 3-4 times daily as directed 15 mL 3    insulin aspart (NovoLOG FlexPen) 100 UNIT/ML solution pen-injector sc pen Inject 6 units for blood sugar 250-300, 12 units for blood sugar 300-350, 18 units for blood sugar over 350  3-4 times daily as directed 15 mL 3    Insulin Glargine (BASAGLAR KWIKPEN) 100 UNIT/ML injection pen Inject 75 units under the skin into the appropriate area as directed nightly. 15 mL 3    Insulin Glargine, 1 Unit Dial, (Toujeo SoloStar) 300 UNIT/ML solution pen-injector injection Inject 60 Units under the skin into the appropriate area as directed Every Night. 9 mL 3    levothyroxine (SYNTHROID, LEVOTHROID) 200 MCG tablet Take 1 tablet by mouth Every Morning. 90 tablet 1    NovoLOG FlexPen 100 UNIT/ML solution pen-injector sc pen Inject 6 units for blood sugar 250-300, 12 units for blood sugar 300-350, 18 units for blood sugar over 350  3-4 times daily as directed 15 mL 3    Semaglutide, 1 MG/DOSE, (Ozempic, 1 MG/DOSE,) 4 MG/3ML solution pen-injector Inject 1 mg under the skin into the appropriate area as directed 1 (One) Time Per Week. 3 mL 3    silver sulfadiazine (SILVADENE, SSD) 1 % cream Apply 1 Application topically to the appropriate area as directed Daily. 50 g 1    simvastatin (ZOCOR) 80 MG tablet Take 1 tablet by mouth once daily 90 tablet 3    vitamin D (ERGOCALCIFEROL) 1.25 MG (54960 UT) capsule capsule Take 1 capsule by mouth once a week 12 capsule 1     No current facility-administered medications for this visit.     Review of Systems   Constitutional:  Negative for chills and fever.   HENT:  Negative for congestion.    Respiratory:  Negative for shortness of breath.    Cardiovascular:  Negative for chest pain and leg swelling.   Gastrointestinal:  Negative for constipation, diarrhea, nausea and vomiting.   Musculoskeletal:  Positive for arthralgias and gait problem.   Skin:  Negative for wound.   Neurological:  Positive for numbness.   Psychiatric/Behavioral:   Negative for agitation.        OBJECTIVE     Vitals:    08/13/24 1022   BP: 130/52   Pulse: 97   Resp: 18   SpO2: 97%       PHYSICAL EXAM  GEN:   Accompanied by daughter.     Foot/Ankle Exam    GENERAL  Diabetic foot exam performed    Appearance:  appears stated age and obese  Orientation:  AAOx3  Affect:  appropriate  Gait:  (somehat unsteady)  Assistance:  cane use  Right shoe gear: casual shoe  Left shoe gear: casual shoe    VASCULAR     Right Foot Vascularity   Dorsalis pedis:  2+  Posterior tibial:  2+  Skin temperature:  warm  Edema grading:  None  CFT:  3  Pedal hair growth:  Present  Varicosities:  mild varicosities     Left Foot Vascularity   Dorsalis pedis:  2+  Posterior tibial:  2+  Skin temperature:  warm  Edema grading:  None  CFT:  3  Pedal hair growth:  Present  Varicosities:  mild varicosities     NEUROLOGIC     Right Foot Neurologic   Light touch sensation: diminished  Vibratory sensation: diminished  Hot/Cold sensation: diminished  Protective Sensation using [place] Monofilament:   Sites intact: 5  Sites tested: 10     Left Foot Neurologic   Light touch sensation: diminished  Vibratory sensation: diminished  Hot/Cold sensation:  diminished  Protective Sensation using [place] Monofilament:   Sites intact: 5  Sites tested: 10    MUSCULOSKELETAL     Right Foot Musculoskeletal   Ecchymosis:  none  Tenderness:  none    Arch:  Normal     Left Foot Musculoskeletal   Ecchymosis:  none  Tenderness:  none  Arch:  Normal    MUSCLE STRENGTH     Right Foot Muscle Strength   Foot dorsiflexion:  5  Foot plantar flexion:  5  Foot inversion:  5  Foot eversion:  5     Left Foot Muscle Strength   Foot dorsiflexion:  5  Foot plantar flexion:  5  Foot inversion:  5  Foot eversion:  5    RANGE OF MOTION     Right Foot Range of Motion   Foot and ankle ROM within normal limits       Left Foot Range of Motion   Foot and ankle ROM within normal limits      DERMATOLOGIC      Right Foot Dermatologic    Skin  Positive for dryness.   Nails  1.  Positive for elongated, onychomycosis, abnormal thickness and subungual debris.  2.  Positive for elongated, abnormal thickness and subungual debris.  3.  Positive for elongated, abnormal thickness and subungual debris.  4.  Positive for elongated, abnormal thickness and subungual debris.  5.  Positive for elongated, abnormal thickness and subungual debris.     Left Foot Dermatologic   Skin  Positive for dryness.   Nails  1.  Positive for elongated, onychomycosis, abnormal thickness and subungual debris.  2.  Positive for elongated, abnormal thickness and subungual debris.  3.  Positive for elongated, abnormal thickness and subungual debris.  4.  Positive for elongated, abnormally thick and subungual debris.  5.  Positive for elongated, abnormally thick and subungual debris.      RADIOLOGY/NUCLEAR:  No results found.    LABORATORY/CULTURE RESULTS:      PATHOLOGY RESULTS:       ASSESSMENT/PLAN     Diagnoses and all orders for this visit:    1. Onychomycosis (Primary)    2. Thickened nails    3. Type 2 diabetes mellitus with diabetic neuropathy, with long-term current use of insulin    4. Encounter for diabetic foot exam    5. BMI 40.0-44.9, adult    6. Gait difficulty      Comprehensive lower extremity examination and evaluation was performed.  Discussed findings and treatment plan including risks, benefits, and treatment options with patient in detail. Patient agreed with treatment plan.  DFE performed  After verbal consent obtained, nail(s) x10 debrided of length and thickness with nail nipper without incidence  Patient may maintain nails and calluses at home utilizing emery board or pumice stone between visits as needed  Reviewed at home diabetic foot care including daily foot checks   Moisturize and wash feet on regular basis.   Avoid barefoot walking.   Continue use of cane for gait support.  Continue to work with PCP on improved BG control.  An After Visit Summary was  printed and given to the patient at discharge, including (if requested) any available informative/educational handouts regarding diagnosis, treatment, or medications. All questions were answered to patient/family satisfaction. Should symptoms fail to improve or worsen they agree to call or return to clinic or to go to the Emergency Department. Discussed the importance of following up with any needed screening tests/labs/specialist appointments and any requested follow-up recommended by me today. Importance of maintaining follow-up discussed and patient accepts that missed appointments can delay diagnosis and potentially lead to worsening of conditions.  Return in about 3 months (around 11/13/2024) for Follow-up with Podiatry APRN, Schedule Foot Care Clinic., or sooner if acute issues arise.      This document has been electronically signed by Humphrey Covarrubias DPM on August 13, 2024 10:45 CDT

## 2024-08-12 ENCOUNTER — TELEPHONE (OUTPATIENT)
Age: 64
End: 2024-08-12
Payer: MEDICARE

## 2024-08-13 ENCOUNTER — OFFICE VISIT (OUTPATIENT)
Age: 64
End: 2024-08-13
Payer: MEDICARE

## 2024-08-13 VITALS
HEIGHT: 67 IN | SYSTOLIC BLOOD PRESSURE: 130 MMHG | BODY MASS INDEX: 40.18 KG/M2 | DIASTOLIC BLOOD PRESSURE: 52 MMHG | WEIGHT: 256 LBS | HEART RATE: 97 BPM | OXYGEN SATURATION: 97 % | RESPIRATION RATE: 18 BRPM

## 2024-08-13 DIAGNOSIS — L60.2 THICKENED NAILS: ICD-10-CM

## 2024-08-13 DIAGNOSIS — Z79.4 TYPE 2 DIABETES MELLITUS WITH DIABETIC NEUROPATHY, WITH LONG-TERM CURRENT USE OF INSULIN: ICD-10-CM

## 2024-08-13 DIAGNOSIS — B35.1 ONYCHOMYCOSIS: Primary | ICD-10-CM

## 2024-08-13 DIAGNOSIS — E11.9 ENCOUNTER FOR DIABETIC FOOT EXAM: ICD-10-CM

## 2024-08-13 DIAGNOSIS — R26.9 GAIT DIFFICULTY: ICD-10-CM

## 2024-08-13 DIAGNOSIS — E11.40 TYPE 2 DIABETES MELLITUS WITH DIABETIC NEUROPATHY, WITH LONG-TERM CURRENT USE OF INSULIN: ICD-10-CM

## 2024-08-13 PROBLEM — E66.01 MORBID OBESITY DUE TO EXCESS CALORIES: Status: ACTIVE | Noted: 2018-06-24

## 2024-08-13 PROBLEM — Z91.199 NONCOMPLIANCE: Status: ACTIVE | Noted: 2021-01-04

## 2024-08-13 PROBLEM — E78.2 MIXED HYPERLIPIDEMIA: Status: ACTIVE | Noted: 2024-08-13

## 2024-08-13 PROBLEM — M15.9 GENERALIZED OSTEOARTHRITIS: Status: ACTIVE | Noted: 2018-03-21

## 2024-08-13 PROBLEM — I10 ESSENTIAL HYPERTENSION: Status: ACTIVE | Noted: 2024-08-13

## 2024-08-13 PROBLEM — E11.65 UNCONTROLLED TYPE 2 DIABETES MELLITUS WITH HYPERGLYCEMIA: Status: ACTIVE | Noted: 2021-01-10

## 2024-08-13 PROBLEM — E03.9 ACQUIRED HYPOTHYROIDISM: Status: ACTIVE | Noted: 2024-08-13

## 2024-08-13 PROCEDURE — 3075F SYST BP GE 130 - 139MM HG: CPT | Performed by: PODIATRIST

## 2024-08-13 PROCEDURE — 1160F RVW MEDS BY RX/DR IN RCRD: CPT | Performed by: PODIATRIST

## 2024-08-13 PROCEDURE — 11721 DEBRIDE NAIL 6 OR MORE: CPT | Performed by: PODIATRIST

## 2024-08-13 PROCEDURE — 3078F DIAST BP <80 MM HG: CPT | Performed by: PODIATRIST

## 2024-08-13 PROCEDURE — 1159F MED LIST DOCD IN RCRD: CPT | Performed by: PODIATRIST

## 2024-08-13 PROCEDURE — 99203 OFFICE O/P NEW LOW 30 MIN: CPT | Performed by: PODIATRIST

## 2024-08-13 NOTE — PATIENT INSTRUCTIONS
Diabetes Mellitus and Foot Care  Diabetes, also called diabetes mellitus, may cause problems with your feet and legs because of poor blood flow (circulation). Poor circulation may make your skin:  Become thinner and drier.  Break more easily.  Heal more slowly.  Peel and crack.  You may also have nerve damage (neuropathy). This can cause decreased feeling in your legs and feet. This means that you may not notice minor injuries to your feet that could lead to more serious problems. Finding and treating problems early is the best way to prevent future foot problems.  How to care for your feet  Foot hygiene    Wash your feet daily with warm water and mild soap. Do not use hot water. Then, pat your feet and the areas between your toes until they are fully dry. Do not soak your feet. This can dry your skin.  Trim your toenails straight across. Do not dig under them or around the cuticle. File the edges of your nails with an emery board or nail file.  Apply a moisturizing lotion or petroleum jelly to the skin on your feet and to dry, brittle toenails. Use lotion that does not contain alcohol and is unscented. Do not apply lotion between your toes.  Shoes and socks  Wear clean socks or stockings every day. Make sure they are not too tight. Do not wear knee-high stockings. These may decrease blood flow to your legs.  Wear shoes that fit well and have enough cushioning. Always look in your shoes before you put them on to be sure there are no objects inside.  To break in new shoes, wear them for just a few hours a day. This prevents injuries on your feet.  Wounds, scrapes, corns, and calluses    Check your feet daily for blisters, cuts, bruises, sores, and redness. If you cannot see the bottom of your feet, use a mirror or ask someone for help.  Do not cut off corns or calluses or try to remove them with medicine.  If you find a minor scrape, cut, or break in the skin on your feet, keep it and the skin around it clean and  dry. You may clean these areas with mild soap and water. Do not clean the area with peroxide, alcohol, or iodine.  If you have a wound, scrape, corn, or callus on your foot, look at it several times a day to make sure it is healing and not infected. Check for:  Redness, swelling, or pain.  Fluid or blood.  Warmth.  Pus or a bad smell.  General tips  Do not cross your legs. This may decrease blood flow to your feet.  Do not use heating pads or hot water bottles on your feet. They may burn your skin. If you have lost feeling in your feet or legs, you may not know this is happening until it is too late.  Protect your feet from hot and cold by wearing shoes, such as at the beach or on hot pavement.  Schedule a complete foot exam at least once a year or more often if you have foot problems. Report any cuts, sores, or bruises to your health care provider right away.  Where to find more information  American Diabetes Association: diabetes.org  Association of Diabetes Care & Education Specialists: diabeteseducator.org  Contact a health care provider if:  You have a condition that increases your risk of infection, and you have any cuts, sores, or bruises on your feet.  You have an injury that is not healing.  You have redness on your legs or feet.  You feel burning or tingling in your legs or feet.  You have pain or cramps in your legs and feet.  Your legs or feet are numb.  Your feet always feel cold.  You have pain around any toenails.  Get help right away if:  You have a wound, scrape, corn, or callus on your foot and:  You have signs of infection.  You have a fever.  You have a red line going up your leg.  This information is not intended to replace advice given to you by your health care provider. Make sure you discuss any questions you have with your health care provider.  Document Revised: 06/21/2023 Document Reviewed: 06/21/2023  Elsevier Patient Education © 2024 Elsevier Inc.     Statement Selected

## 2024-09-06 ENCOUNTER — OFFICE VISIT (OUTPATIENT)
Dept: INTERNAL MEDICINE | Age: 64
End: 2024-09-06
Payer: COMMERCIAL

## 2024-09-06 VITALS
WEIGHT: 250 LBS | OXYGEN SATURATION: 99 % | DIASTOLIC BLOOD PRESSURE: 58 MMHG | BODY MASS INDEX: 39.24 KG/M2 | HEART RATE: 98 BPM | SYSTOLIC BLOOD PRESSURE: 100 MMHG | HEIGHT: 67 IN

## 2024-09-06 DIAGNOSIS — E66.01 MORBID OBESITY DUE TO EXCESS CALORIES (HCC): ICD-10-CM

## 2024-09-06 DIAGNOSIS — E11.65 UNCONTROLLED TYPE 2 DIABETES MELLITUS WITH HYPERGLYCEMIA (HCC): ICD-10-CM

## 2024-09-06 DIAGNOSIS — F09 COGNITIVE DYSFUNCTION: ICD-10-CM

## 2024-09-06 DIAGNOSIS — E55.9 VITAMIN D DEFICIENCY: ICD-10-CM

## 2024-09-06 DIAGNOSIS — Z87.820 HISTORY OF CLOSED HEAD INJURY: ICD-10-CM

## 2024-09-06 DIAGNOSIS — E11.65 UNCONTROLLED TYPE 2 DIABETES MELLITUS WITH HYPERGLYCEMIA (HCC): Primary | ICD-10-CM

## 2024-09-06 LAB
25(OH)D3 SERPL-MCNC: 15.1 NG/ML
ALBUMIN SERPL-MCNC: 4.2 G/DL (ref 3.5–5.2)
ALP SERPL-CCNC: 102 U/L (ref 35–104)
ALT SERPL-CCNC: 12 U/L (ref 5–33)
ANION GAP SERPL CALCULATED.3IONS-SCNC: 13 MMOL/L (ref 7–19)
AST SERPL-CCNC: 21 U/L (ref 5–32)
BILIRUB SERPL-MCNC: 0.6 MG/DL (ref 0.2–1.2)
BUN SERPL-MCNC: 17 MG/DL (ref 8–23)
CALCIUM SERPL-MCNC: 9.4 MG/DL (ref 8.8–10.2)
CHLORIDE SERPL-SCNC: 105 MMOL/L (ref 98–111)
CHOLEST SERPL-MCNC: 142 MG/DL (ref 0–199)
CO2 SERPL-SCNC: 27 MMOL/L (ref 22–29)
CREAT SERPL-MCNC: 0.6 MG/DL (ref 0.5–0.9)
ERYTHROCYTE [DISTWIDTH] IN BLOOD BY AUTOMATED COUNT: 12.1 % (ref 11.5–14.5)
GLUCOSE SERPL-MCNC: 128 MG/DL (ref 70–99)
HBA1C MFR BLD: 8.2 % (ref 4–5.6)
HCT VFR BLD AUTO: 47.1 % (ref 37–47)
HDLC SERPL-MCNC: 63 MG/DL (ref 40–60)
HGB BLD-MCNC: 15.6 G/DL (ref 12–16)
LDLC SERPL CALC-MCNC: 46 MG/DL
MCH RBC QN AUTO: 30 PG (ref 27–31)
MCHC RBC AUTO-ENTMCNC: 33.1 G/DL (ref 33–37)
MCV RBC AUTO: 90.6 FL (ref 81–99)
PLATELET # BLD AUTO: 342 K/UL (ref 130–400)
PMV BLD AUTO: 10.4 FL (ref 9.4–12.3)
POTASSIUM SERPL-SCNC: 3.4 MMOL/L (ref 3.5–5)
PROT SERPL-MCNC: 7.4 G/DL (ref 6.4–8.3)
RBC # BLD AUTO: 5.2 M/UL (ref 4.2–5.4)
SODIUM SERPL-SCNC: 145 MMOL/L (ref 136–145)
TRIGL SERPL-MCNC: 163 MG/DL (ref 0–149)
TSH SERPL DL<=0.005 MIU/L-ACNC: 0.27 UIU/ML (ref 0.27–4.2)
WBC # BLD AUTO: 9.2 K/UL (ref 4.8–10.8)

## 2024-09-06 PROCEDURE — 3078F DIAST BP <80 MM HG: CPT | Performed by: INTERNAL MEDICINE

## 2024-09-06 PROCEDURE — 99214 OFFICE O/P EST MOD 30 MIN: CPT | Performed by: INTERNAL MEDICINE

## 2024-09-06 PROCEDURE — 3074F SYST BP LT 130 MM HG: CPT | Performed by: INTERNAL MEDICINE

## 2024-09-06 PROCEDURE — 3052F HG A1C>EQUAL 8.0%<EQUAL 9.0%: CPT | Performed by: INTERNAL MEDICINE

## 2024-09-06 RX ORDER — FESOTERODINE FUMARATE 4 MG/1
4 TABLET, FILM COATED, EXTENDED RELEASE ORAL DAILY
Qty: 30 TABLET | Refills: 3 | Status: SHIPPED | OUTPATIENT
Start: 2024-09-06

## 2024-09-06 RX ORDER — DULOXETIN HYDROCHLORIDE 60 MG/1
60 CAPSULE, DELAYED RELEASE ORAL DAILY
Qty: 90 CAPSULE | Refills: 3
Start: 2024-09-06 | End: 2025-09-01

## 2024-09-06 SDOH — ECONOMIC STABILITY: INCOME INSECURITY: HOW HARD IS IT FOR YOU TO PAY FOR THE VERY BASICS LIKE FOOD, HOUSING, MEDICAL CARE, AND HEATING?: SOMEWHAT HARD

## 2024-09-06 SDOH — ECONOMIC STABILITY: FOOD INSECURITY: WITHIN THE PAST 12 MONTHS, THE FOOD YOU BOUGHT JUST DIDN'T LAST AND YOU DIDN'T HAVE MONEY TO GET MORE.: NEVER TRUE

## 2024-09-06 SDOH — ECONOMIC STABILITY: FOOD INSECURITY: WITHIN THE PAST 12 MONTHS, YOU WORRIED THAT YOUR FOOD WOULD RUN OUT BEFORE YOU GOT MONEY TO BUY MORE.: NEVER TRUE

## 2024-09-10 RX ORDER — ERGOCALCIFEROL 1.25 MG/1
50000 CAPSULE, LIQUID FILLED ORAL WEEKLY
Qty: 12 CAPSULE | Refills: 1 | Status: SHIPPED | OUTPATIENT
Start: 2024-09-10

## 2024-09-11 ENCOUNTER — APPOINTMENT (OUTPATIENT)
Dept: GENERAL RADIOLOGY | Age: 64
End: 2024-09-11
Payer: COMMERCIAL

## 2024-09-11 ENCOUNTER — HOSPITAL ENCOUNTER (EMERGENCY)
Age: 64
Discharge: HOME | End: 2024-09-11
Attending: STUDENT IN AN ORGANIZED HEALTH CARE EDUCATION/TRAINING PROGRAM
Payer: COMMERCIAL

## 2024-09-11 ENCOUNTER — APPOINTMENT (OUTPATIENT)
Dept: CT IMAGING | Age: 64
End: 2024-09-11
Payer: COMMERCIAL

## 2024-09-11 ENCOUNTER — TELEPHONE (OUTPATIENT)
Dept: INTERNAL MEDICINE | Age: 64
End: 2024-09-11

## 2024-09-11 VITALS
HEART RATE: 97 BPM | OXYGEN SATURATION: 96 % | RESPIRATION RATE: 16 BRPM | DIASTOLIC BLOOD PRESSURE: 86 MMHG | SYSTOLIC BLOOD PRESSURE: 140 MMHG | TEMPERATURE: 98.7 F
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
## 2024-09-11 DIAGNOSIS — M25.562 ACUTE PAIN OF LEFT KNEE: ICD-10-CM

## 2024-09-11 DIAGNOSIS — G31.9 VENTRICULAR ENLARGEMENT DUE TO BRAIN ATROPHY (HCC): Primary | ICD-10-CM

## 2024-09-11 PROCEDURE — 99284 EMERGENCY DEPT VISIT MOD MDM: CPT

## 2024-09-11 PROCEDURE — 70450 CT HEAD/BRAIN W/O DYE: CPT

## 2024-09-11 ASSESSMENT — ENCOUNTER SYMPTOMS
CHEST TIGHTNESS: 0
SORE THROAT: 0
EYE REDNESS: 0
VOMITING: 0
SHORTNESS OF BREATH: 0
EYE PAIN: 0
DIARRHEA: 0
NAUSEA: 0
COUGH: 0
ABDOMINAL PAIN: 0

## 2024-09-15 LAB
EKG P AXIS: 47 DEGREES
EKG P-R INTERVAL: 168 MS
EKG Q-T INTERVAL: 334 MS
EKG QRS DURATION: 78 MS
EKG QTC CALCULATION (BAZETT): 407 MS
EKG T AXIS: 55 DEGREES

## 2024-10-02 RX ORDER — DULOXETIN HYDROCHLORIDE 60 MG/1
60 CAPSULE, DELAYED RELEASE ORAL DAILY
Qty: 90 CAPSULE | Refills: 3 | Status: SHIPPED | OUTPATIENT
Start: 2024-10-02 | End: 2025-09-27

## 2024-10-07 ENCOUNTER — HOSPITAL ENCOUNTER (OUTPATIENT)
Dept: MRI IMAGING | Age: 64
Discharge: HOME | End: 2024-10-07
Payer: COMMERCIAL

## 2024-10-07 DIAGNOSIS — G31.9 VENTRICULAR ENLARGEMENT DUE TO BRAIN ATROPHY (HCC): ICD-10-CM

## 2024-10-07 PROCEDURE — 70551 MRI BRAIN STEM W/O DYE: CPT

## 2024-11-11 NOTE — PROGRESS NOTES
[facility] - PODIATRY    Today's Date: 11/13/2024     Patient Name: Gia Mcqueen  MRN: [id number]  CSN: [id number]  PCP: Jackie Hoyt MD  Referring Provider: No ref. provider found    SUBJECTIVE     Chief Complaint   Patient presents with    Follow-up     Jackie Hoyt MD 03/01/2024 3 months for Follow-up with Podiatry APRN, Schedule Foot Care Clinic.-Patient her for Diabetic nail/foot care. Has a small black area on  top of the left great toe.  Denies any discomfort.      Diabetes     HPI: Gia Mcqueen, a 64 y.o.female, comes to clinic as a(n) established patient presenting for diabetic foot exam and complaining of toenail/callus issues. Patient has h/o DM2, Obesity, OA, hypothyroid, hypercholesterolemia .  Patient presents with family member.  Patient has difficulty with memory issues and is a poor historian. Patient is IDDM and unsure of last BG level.  Last A1c of 7.8%.  Uncertain on amount of numbness to feet. Denies open wounds or sores. Notes that her toenails are long, thick and discolored. She has trouble caring for them herself. Uses a cane for gait support. Denies pain. Denies previous treatment. Denies any constitutional symptoms. No other pedal complaints at this time.    Past Medical History:   Diagnosis Date    Acquired hypothyroidism     Closed head injury     Diabetes mellitus     Essential hypertension     Osteoarthritis      History reviewed. No pertinent surgical history.  History reviewed. No pertinent family history.  Social History     Socioeconomic History    Marital status:    Tobacco Use    Smoking status: Never   Vaping Use    Vaping status: Never Used   Substance and Sexual Activity    Alcohol use: Never    Drug use: Defer     No Known Allergies  Current Outpatient Medications   Medication Sig Dispense Refill    Dulaglutide (Trulicity) 0.75 MG/0.5ML solution pen-injector Inject 0.75 mg into the skin once a week 6 mL 3    DULoxetine (CYMBALTA) 30 MG capsule  Take 1 capsule by mouth daily 30 capsule 1    HumaLOG KwikPen 100 UNIT/ML solution pen-injector Inject 6 units for blood sugar 250-300, 12 units for blood sugar 300-350, 18 units for blood sugar over 350 3-4 times daily as directed 15 mL 3    insulin aspart (NovoLOG FlexPen) 100 UNIT/ML solution pen-injector sc pen Inject 6 units for blood sugar 250-300, 12 units for blood sugar 300-350, 18 units for blood sugar over 350  3-4 times daily as directed 15 mL 3    Insulin Glargine (BASAGLAR KWIKPEN) 100 UNIT/ML injection pen Inject 75 units under the skin into the appropriate area as directed nightly. 15 mL 3    Insulin Glargine, 1 Unit Dial, (Toujeo SoloStar) 300 UNIT/ML solution pen-injector injection Inject 60 Units under the skin into the appropriate area as directed Every Night. 9 mL 3    levothyroxine (SYNTHROID, LEVOTHROID) 200 MCG tablet Take 1 tablet by mouth Every Morning. 90 tablet 1    NovoLOG FlexPen 100 UNIT/ML solution pen-injector sc pen Inject 6 units for blood sugar 250-300, 12 units for blood sugar 300-350, 18 units for blood sugar over 350  3-4 times daily as directed 15 mL 3    Semaglutide, 1 MG/DOSE, (Ozempic, 1 MG/DOSE,) 4 MG/3ML solution pen-injector Inject 1 mg under the skin into the appropriate area as directed 1 (One) Time Per Week. 3 mL 3    silver sulfadiazine (SILVADENE, SSD) 1 % cream Apply 1 Application topically to the appropriate area as directed Daily. 50 g 1    simvastatin (ZOCOR) 80 MG tablet Take 1 tablet by mouth once daily 90 tablet 3     No current facility-administered medications for this visit.     Review of Systems   Constitutional:  Negative for chills and fever.   HENT:  Negative for congestion.    Respiratory:  Negative for shortness of breath.    Cardiovascular:  Negative for chest pain and leg swelling.   Gastrointestinal:  Negative for constipation, diarrhea, nausea and vomiting.   Musculoskeletal:  Positive for arthralgias and gait problem.   Skin:  Negative for  wound.   Neurological:  Positive for numbness.   Psychiatric/Behavioral:  Negative for agitation.        OBJECTIVE     Vitals:    11/13/24 0950   BP: 150/90   Pulse: 80   Resp: 18       PHYSICAL EXAM  GEN:   Accompanied by daughter.     Foot/Ankle Exam    GENERAL  Diabetic foot exam performed    Appearance:  appears stated age and obese  Orientation:  AAOx3  Affect:  appropriate  Gait:  (somehat unsteady)  Assistance:  cane use  Right shoe gear: casual shoe  Left shoe gear: casual shoe    VASCULAR     Right Foot Vascularity   Dorsalis pedis:  2+  Posterior tibial:  2+  Skin temperature:  warm  Edema grading:  None  CFT:  3  Pedal hair growth:  Present  Varicosities:  mild varicosities     Left Foot Vascularity   Dorsalis pedis:  2+  Posterior tibial:  2+  Skin temperature:  warm  Edema grading:  None  CFT:  3  Pedal hair growth:  Present  Varicosities:  mild varicosities     NEUROLOGIC     Right Foot Neurologic   Light touch sensation: diminished  Vibratory sensation: diminished  Hot/Cold sensation: diminished  Protective Sensation using [place] Monofilament:   Sites intact: 5  Sites tested: 10     Left Foot Neurologic   Light touch sensation: diminished  Vibratory sensation: diminished  Hot/Cold sensation:  diminished  Protective Sensation using [place] Monofilament:   Sites intact: 5  Sites tested: 10    MUSCULOSKELETAL     Right Foot Musculoskeletal   Ecchymosis:  none  Tenderness:  none    Arch:  Normal     Left Foot Musculoskeletal   Ecchymosis:  none  Tenderness:  none  Arch:  Normal    MUSCLE STRENGTH     Right Foot Muscle Strength   Foot dorsiflexion:  5  Foot plantar flexion:  5  Foot inversion:  5  Foot eversion:  5     Left Foot Muscle Strength   Foot dorsiflexion:  5  Foot plantar flexion:  5  Foot inversion:  5  Foot eversion:  5    RANGE OF MOTION     Right Foot Range of Motion   Foot and ankle ROM within normal limits       Left Foot Range of Motion   Foot and ankle ROM within normal  limits      DERMATOLOGIC      Right Foot Dermatologic   Skin  Positive for dryness.   Nails  1.  Positive for elongated, onychomycosis, abnormal thickness and subungual debris.  2.  Positive for elongated, abnormal thickness and subungual debris.  3.  Positive for elongated, abnormal thickness and subungual debris.  4.  Positive for elongated, abnormal thickness and subungual debris.  5.  Positive for elongated, abnormal thickness and subungual debris.     Left Foot Dermatologic   Skin  Positive for dryness.   Nails  1.  Positive for elongated, onychomycosis, abnormal thickness and subungual debris.  2.  Positive for elongated, abnormal thickness and subungual debris.  3.  Positive for elongated, abnormal thickness and subungual debris.  4.  Positive for elongated, abnormally thick and subungual debris.  5.  Positive for elongated, abnormally thick and subungual debris.      RADIOLOGY/NUCLEAR:  No results found.    LABORATORY/CULTURE RESULTS:      PATHOLOGY RESULTS:       ASSESSMENT/PLAN     Diagnoses and all orders for this visit:    1. Onychomycosis (Primary)    2. Thickened nails    3. Type 2 diabetes mellitus with diabetic neuropathy, with long-term current use of insulin    4. Gait difficulty    5. Cognitive dysfunction        Comprehensive lower extremity examination and evaluation was performed.  Discussed findings and treatment plan including risks, benefits, and treatment options with patient in detail. Patient agreed with treatment plan.  After verbal consent obtained, nail(s) x10 debrided of length and thickness with nail nipper without incidence  Patient may maintain nails and calluses at home utilizing emery board or pumice stone between visits as needed  Reviewed at home diabetic foot care including daily foot checks   Continue use of cane for gait support.  Continue to work with PCP on improved BG control.  An After Visit Summary was printed and given to the patient at discharge, including (if  requested) any available informative/educational handouts regarding diagnosis, treatment, or medications. All questions were answered to patient/family satisfaction. Should symptoms fail to improve or worsen they agree to call or return to clinic or to go to the Emergency Department. Discussed the importance of following up with any needed screening tests/labs/specialist appointments and any requested follow-up recommended by me today. Importance of maintaining follow-up discussed and patient accepts that missed appointments can delay diagnosis and potentially lead to worsening of conditions.  Return in about 3 months (around 2/13/2025) for Schedule Foot Care Clinic, Follow-up with Podiatry Provider, Diabetic Foot Exam., or sooner if acute issues arise.      This document has been electronically signed by BRIDGET Jaime on November 13, 2024 11:34 CST

## 2024-11-13 ENCOUNTER — OFFICE VISIT (OUTPATIENT)
Age: 64
End: 2024-11-13
Payer: MEDICARE

## 2024-11-13 VITALS
BODY MASS INDEX: 39.39 KG/M2 | RESPIRATION RATE: 18 BRPM | HEART RATE: 80 BPM | HEIGHT: 67 IN | SYSTOLIC BLOOD PRESSURE: 150 MMHG | WEIGHT: 251 LBS | DIASTOLIC BLOOD PRESSURE: 90 MMHG

## 2024-11-13 DIAGNOSIS — F09 COGNITIVE DYSFUNCTION: ICD-10-CM

## 2024-11-13 DIAGNOSIS — Z79.4 TYPE 2 DIABETES MELLITUS WITH DIABETIC NEUROPATHY, WITH LONG-TERM CURRENT USE OF INSULIN: ICD-10-CM

## 2024-11-13 DIAGNOSIS — B35.1 ONYCHOMYCOSIS: Primary | ICD-10-CM

## 2024-11-13 DIAGNOSIS — R26.9 GAIT DIFFICULTY: ICD-10-CM

## 2024-11-13 DIAGNOSIS — E11.40 TYPE 2 DIABETES MELLITUS WITH DIABETIC NEUROPATHY, WITH LONG-TERM CURRENT USE OF INSULIN: ICD-10-CM

## 2024-11-13 DIAGNOSIS — L60.2 THICKENED NAILS: ICD-10-CM

## 2024-11-13 PROBLEM — R41.3 MEMORY LOSS: Status: ACTIVE | Noted: 2021-02-23

## 2024-11-13 PROBLEM — Z87.820 HISTORY OF CLOSED HEAD INJURY: Status: ACTIVE | Noted: 2021-03-10

## 2024-11-13 PROBLEM — F51.01 PRIMARY INSOMNIA: Status: ACTIVE | Noted: 2018-11-01

## 2024-12-04 DIAGNOSIS — E11.65 UNCONTROLLED TYPE 2 DIABETES MELLITUS WITH HYPERGLYCEMIA (HCC): ICD-10-CM

## 2024-12-04 RX ORDER — PROCHLORPERAZINE 25 MG/1
SUPPOSITORY RECTAL
Qty: 1 EACH | Refills: 1 | Status: SHIPPED | OUTPATIENT
Start: 2024-12-04

## 2024-12-04 RX ORDER — PROCHLORPERAZINE 25 MG/1
SUPPOSITORY RECTAL
Qty: 3 EACH | Refills: 3 | Status: SHIPPED | OUTPATIENT
Start: 2024-12-04

## 2024-12-27 RX ORDER — SIMVASTATIN 80 MG
TABLET ORAL
Qty: 90 TABLET | Refills: 3 | Status: SHIPPED | OUTPATIENT
Start: 2024-12-27

## 2024-12-30 DIAGNOSIS — E03.9 ACQUIRED HYPOTHYROIDISM: ICD-10-CM

## 2024-12-30 RX ORDER — LEVOTHYROXINE SODIUM 200 UG/1
200 TABLET ORAL DAILY
Qty: 90 TABLET | Refills: 1 | OUTPATIENT
Start: 2024-12-30

## 2025-01-27 DIAGNOSIS — Z79.4 TYPE 2 DIABETES MELLITUS WITHOUT COMPLICATION, WITH LONG-TERM CURRENT USE OF INSULIN (HCC): ICD-10-CM

## 2025-01-27 DIAGNOSIS — E11.9 TYPE 2 DIABETES MELLITUS WITHOUT COMPLICATION, WITH LONG-TERM CURRENT USE OF INSULIN (HCC): ICD-10-CM

## 2025-01-27 DIAGNOSIS — E11.65 UNCONTROLLED TYPE 2 DIABETES MELLITUS WITH HYPERGLYCEMIA (HCC): ICD-10-CM

## 2025-02-03 ENCOUNTER — OFFICE VISIT (OUTPATIENT)
Dept: INTERNAL MEDICINE | Age: 65
End: 2025-02-03

## 2025-02-03 VITALS
DIASTOLIC BLOOD PRESSURE: 60 MMHG | BODY MASS INDEX: 39.24 KG/M2 | OXYGEN SATURATION: 98 % | SYSTOLIC BLOOD PRESSURE: 112 MMHG | HEIGHT: 67 IN | HEART RATE: 92 BPM | WEIGHT: 250 LBS

## 2025-02-03 DIAGNOSIS — E78.2 MIXED HYPERLIPIDEMIA: ICD-10-CM

## 2025-02-03 DIAGNOSIS — Z00.00 MEDICARE ANNUAL WELLNESS VISIT, SUBSEQUENT: Primary | ICD-10-CM

## 2025-02-03 DIAGNOSIS — I10 ESSENTIAL HYPERTENSION: ICD-10-CM

## 2025-02-03 DIAGNOSIS — E03.9 ACQUIRED HYPOTHYROIDISM: ICD-10-CM

## 2025-02-03 DIAGNOSIS — M15.9 GENERALIZED OSTEOARTHRITIS: ICD-10-CM

## 2025-02-03 DIAGNOSIS — E11.65 UNCONTROLLED TYPE 2 DIABETES MELLITUS WITH HYPERGLYCEMIA (HCC): ICD-10-CM

## 2025-02-03 DIAGNOSIS — Z87.820 HISTORY OF CLOSED HEAD INJURY: ICD-10-CM

## 2025-02-03 DIAGNOSIS — Z00.00 EXAMINATION, MEDICAL, GENERAL: Primary | ICD-10-CM

## 2025-02-03 DIAGNOSIS — Z12.31 SCREENING MAMMOGRAM FOR BREAST CANCER: ICD-10-CM

## 2025-02-03 DIAGNOSIS — Z12.11 SCREEN FOR COLON CANCER: ICD-10-CM

## 2025-02-03 DIAGNOSIS — E66.01 MORBID (SEVERE) OBESITY DUE TO EXCESS CALORIES: ICD-10-CM

## 2025-02-03 LAB
ALBUMIN SERPL-MCNC: 3.8 G/DL (ref 3.5–5.2)
ALP SERPL-CCNC: 104 U/L (ref 35–104)
ALT SERPL-CCNC: 16 U/L (ref 5–33)
ANION GAP SERPL CALCULATED.3IONS-SCNC: 14 MMOL/L (ref 8–16)
AST SERPL-CCNC: 18 U/L (ref 5–32)
BASOPHILS # BLD: 0 K/UL (ref 0–0.2)
BASOPHILS NFR BLD: 0.5 % (ref 0–1)
BILIRUB SERPL-MCNC: 0.5 MG/DL (ref 0.2–1.2)
BUN SERPL-MCNC: 17 MG/DL (ref 8–23)
CALCIUM SERPL-MCNC: 9.7 MG/DL (ref 8.8–10.2)
CHLORIDE SERPL-SCNC: 99 MMOL/L (ref 98–107)
CHOLEST SERPL-MCNC: 182 MG/DL (ref 0–199)
CO2 SERPL-SCNC: 27 MMOL/L (ref 22–29)
CREAT SERPL-MCNC: 0.6 MG/DL (ref 0.5–0.9)
EOSINOPHIL # BLD: 0.2 K/UL (ref 0–0.6)
EOSINOPHIL NFR BLD: 1.9 % (ref 0–5)
ERYTHROCYTE [DISTWIDTH] IN BLOOD BY AUTOMATED COUNT: 12.3 % (ref 11.5–14.5)
GLUCOSE SERPL-MCNC: 210 MG/DL (ref 70–99)
HBA1C MFR BLD: 8.2 % (ref 4–5.6)
HCT VFR BLD AUTO: 43.6 % (ref 37–47)
HDLC SERPL-MCNC: 66 MG/DL (ref 40–60)
HGB BLD-MCNC: 14.5 G/DL (ref 12–16)
IMM GRANULOCYTES # BLD: 0 K/UL
LDLC SERPL CALC-MCNC: 89 MG/DL
LYMPHOCYTES # BLD: 3 K/UL (ref 1.1–4.5)
LYMPHOCYTES NFR BLD: 38.5 % (ref 20–40)
MCH RBC QN AUTO: 30.5 PG (ref 27–31)
MCHC RBC AUTO-ENTMCNC: 33.3 G/DL (ref 33–37)
MCV RBC AUTO: 91.8 FL (ref 81–99)
MONOCYTES # BLD: 0.8 K/UL (ref 0–0.9)
MONOCYTES NFR BLD: 10.3 % (ref 0–10)
NEUTROPHILS # BLD: 3.8 K/UL (ref 1.5–7.5)
NEUTS SEG NFR BLD: 48.7 % (ref 50–65)
PLATELET # BLD AUTO: 285 K/UL (ref 130–400)
PMV BLD AUTO: 10.4 FL (ref 9.4–12.3)
POTASSIUM SERPL-SCNC: 4.9 MMOL/L (ref 3.5–5.1)
PROT SERPL-MCNC: 6.8 G/DL (ref 6.4–8.3)
RBC # BLD AUTO: 4.75 M/UL (ref 4.2–5.4)
SODIUM SERPL-SCNC: 140 MMOL/L (ref 136–145)
TRIGL SERPL-MCNC: 133 MG/DL (ref 0–149)
TSH SERPL DL<=0.005 MIU/L-ACNC: 0.11 UIU/ML (ref 0.27–4.2)
WBC # BLD AUTO: 7.9 K/UL (ref 4.8–10.8)

## 2025-02-03 SDOH — ECONOMIC STABILITY: TRANSPORTATION INSECURITY
IN THE PAST 12 MONTHS, HAS THE LACK OF TRANSPORTATION KEPT YOU FROM MEDICAL APPOINTMENTS OR FROM GETTING MEDICATIONS?: YES

## 2025-02-03 SDOH — ECONOMIC STABILITY: TRANSPORTATION INSECURITY
IN THE PAST 12 MONTHS, HAS LACK OF TRANSPORTATION KEPT YOU FROM MEETINGS, WORK, OR FROM GETTING THINGS NEEDED FOR DAILY LIVING?: YES

## 2025-02-03 SDOH — ECONOMIC STABILITY: INCOME INSECURITY: IN THE LAST 12 MONTHS, WAS THERE A TIME WHEN YOU WERE NOT ABLE TO PAY THE MORTGAGE OR RENT ON TIME?: NO

## 2025-02-03 SDOH — ECONOMIC STABILITY: FOOD INSECURITY: WITHIN THE PAST 12 MONTHS, YOU WORRIED THAT YOUR FOOD WOULD RUN OUT BEFORE YOU GOT MONEY TO BUY MORE.: NEVER TRUE

## 2025-02-03 SDOH — HEALTH STABILITY: PHYSICAL HEALTH: ON AVERAGE, HOW MANY DAYS PER WEEK DO YOU ENGAGE IN MODERATE TO STRENUOUS EXERCISE (LIKE A BRISK WALK)?: 0 DAYS

## 2025-02-03 SDOH — HEALTH STABILITY: PHYSICAL HEALTH: ON AVERAGE, HOW MANY MINUTES DO YOU ENGAGE IN EXERCISE AT THIS LEVEL?: 0 MIN

## 2025-02-03 SDOH — ECONOMIC STABILITY: FOOD INSECURITY: WITHIN THE PAST 12 MONTHS, THE FOOD YOU BOUGHT JUST DIDN'T LAST AND YOU DIDN'T HAVE MONEY TO GET MORE.: SOMETIMES TRUE

## 2025-02-03 ASSESSMENT — LIFESTYLE VARIABLES
HOW OFTEN DO YOU HAVE A DRINK CONTAINING ALCOHOL: 1
HOW MANY STANDARD DRINKS CONTAINING ALCOHOL DO YOU HAVE ON A TYPICAL DAY: 0
HOW OFTEN DO YOU HAVE SIX OR MORE DRINKS ON ONE OCCASION: 1
HOW MANY STANDARD DRINKS CONTAINING ALCOHOL DO YOU HAVE ON A TYPICAL DAY: PATIENT DOES NOT DRINK
HOW OFTEN DO YOU HAVE A DRINK CONTAINING ALCOHOL: NEVER

## 2025-02-03 ASSESSMENT — PATIENT HEALTH QUESTIONNAIRE - PHQ9
10. IF YOU CHECKED OFF ANY PROBLEMS, HOW DIFFICULT HAVE THESE PROBLEMS MADE IT FOR YOU TO DO YOUR WORK, TAKE CARE OF THINGS AT HOME, OR GET ALONG WITH OTHER PEOPLE: NOT DIFFICULT AT ALL
2. FEELING DOWN, DEPRESSED OR HOPELESS: SEVERAL DAYS
4. FEELING TIRED OR HAVING LITTLE ENERGY: MORE THAN HALF THE DAYS
9. THOUGHTS THAT YOU WOULD BE BETTER OFF DEAD, OR OF HURTING YOURSELF: NOT AT ALL
1. LITTLE INTEREST OR PLEASURE IN DOING THINGS: NEARLY EVERY DAY
6. FEELING BAD ABOUT YOURSELF - OR THAT YOU ARE A FAILURE OR HAVE LET YOURSELF OR YOUR FAMILY DOWN: NOT AT ALL
SUM OF ALL RESPONSES TO PHQ9 QUESTIONS 1 & 2: 4
8. MOVING OR SPEAKING SO SLOWLY THAT OTHER PEOPLE COULD HAVE NOTICED. OR THE OPPOSITE, BEING SO FIGETY OR RESTLESS THAT YOU HAVE BEEN MOVING AROUND A LOT MORE THAN USUAL: NOT AT ALL
SUM OF ALL RESPONSES TO PHQ QUESTIONS 1-9: 13
7. TROUBLE CONCENTRATING ON THINGS, SUCH AS READING THE NEWSPAPER OR WATCHING TELEVISION: SEVERAL DAYS
5. POOR APPETITE OR OVEREATING: NEARLY EVERY DAY
SUM OF ALL RESPONSES TO PHQ QUESTIONS 1-9: 13
3. TROUBLE FALLING OR STAYING ASLEEP: NEARLY EVERY DAY

## 2025-02-03 ASSESSMENT — ENCOUNTER SYMPTOMS
COUGH: 0
SINUS PRESSURE: 0
ABDOMINAL PAIN: 0
BACK PAIN: 0
SHORTNESS OF BREATH: 0
EYE DISCHARGE: 0
EYE REDNESS: 0
ABDOMINAL DISTENTION: 0

## 2025-02-03 NOTE — PROGRESS NOTES
Chief Complaint   Patient presents with    Medicare AW       HPI:   History of Present Illness  The patient is a 52-year-old female who presents for medicare annual wellness exam and for evaluation of diabetes, sleep issues, and pinky finger pain. She is accompanied by her daughter.    Her daughter and son has been assisting with medication management due to her history of a brain injury from a fall. He reports that she was inadvertently receiving double doses of Ozempic weekly for a period of 3 months. There is uncertainty regarding the administration of Toujeo, as occasionally the medication appears to be depleted prematurely. To mitigate this, her daughter has assumed primary responsibility for her medication administration. She has been receiving Toujeo at a dosage of 70 units, although the chart indicates a dosage of 60 units. They are currently awaiting approval from [employer] for the increased dosage. She has 3 prescription refills remaining and reports no hypoglycemic episodes. Her blood glucose levels have remained stable at approximately 130, with regular medication adherence.    She has been experiencing sleep disturbances, with difficulty sleeping at night and occasionally in the morning. She has been attempting to increase her physical activity, including walking around her large yard. However, she expresses fear of descending stairs and requires assistance for outdoor activities. She has been inactive recently, with her daughter taking over her daily medication administration, leaving less time for physical activities. They have been attempting to leave the house at least once a week for therapy sessions at [facility], where they consult with Angie Khan. They have also been exploring [facility] options. She has expressed interest in walking and cycling as the weather improves. They aim to schedule appointments at [facility] weekly, but often have to cancel due to her variable sleep schedule.

## 2025-02-04 RX ORDER — METFORMIN HYDROCHLORIDE 500 MG/1
500 TABLET, EXTENDED RELEASE ORAL
Qty: 90 TABLET | Refills: 1 | Status: SHIPPED | OUTPATIENT
Start: 2025-02-04

## 2025-02-04 RX ORDER — LEVOTHYROXINE SODIUM 175 UG/1
175 TABLET ORAL DAILY
Qty: 90 TABLET | Refills: 1 | Status: SHIPPED | OUTPATIENT
Start: 2025-02-04

## 2025-02-07 DIAGNOSIS — E11.65 UNCONTROLLED TYPE 2 DIABETES MELLITUS WITH HYPERGLYCEMIA (HCC): ICD-10-CM

## 2025-02-07 RX ORDER — INSULIN GLARGINE 300 U/ML
70 INJECTION, SOLUTION SUBCUTANEOUS NIGHTLY
Qty: 5 EACH | Refills: 1 | Status: SHIPPED | OUTPATIENT
Start: 2025-02-07

## 2025-02-12 NOTE — PROGRESS NOTES
[facility] - PODIATRY    Today's Date: 02/13/2025     Patient Name: Gia Mcqueen  MRN: [id number]  CSN: [id number]  PCP: Jackie Hoyt MD  Referring Provider: No ref. provider found    SUBJECTIVE     Chief Complaint   Patient presents with    Follow-up     Jackie Hoyt MD 02/03/2025  3 [facility] DIABETIC pt states here for nail/foot care. Pt states having pain in both feet. Pain level 3/10     HPI: Gia Mcqueen, a 64 y.o.female, comes to clinic as a(n) established patient presenting for diabetic foot exam and complaining of toenail/callus issues. Patient has h/o DM2, Obesity, OA, hypothyroid, hypercholesterolemia .  Patient presents with family member.  Patient has difficulty with memory issues and is a poor historian. Patient is IDDM and unsure of last BG level.  Last A1c of 8.2%.  Uncertain on amount of numbness to feet. Denies open wounds or sores. Notes that her toenails are long, thick and discolored. She has trouble caring for them herself. Uses a cane for gait support. Admits pain at 3/10 level and centered around bilateral feet . Denies previous treatment. Denies any constitutional symptoms. No other pedal complaints at this time.    Past Medical History:   Diagnosis Date    Acquired hypothyroidism     Closed head injury     Diabetes mellitus     Essential hypertension     Osteoarthritis      History reviewed. No pertinent surgical history.  History reviewed. No pertinent family history.  Social History     Socioeconomic History    Marital status:    Tobacco Use    Smoking status: Never     Passive exposure: Never    Smokeless tobacco: Never   Vaping Use    Vaping status: Never Used   Substance and Sexual Activity    Alcohol use: Never    Drug use: Defer     Allergies   Allergen Reactions    Compazine [Prochlorperazine] Nausea And Vomiting     Current Outpatient Medications   Medication Sig Dispense Refill    DULoxetine (CYMBALTA) 30 MG capsule Take 1 capsule by  mouth daily 30 capsule 1    Insulin Glargine, 1 Unit Dial, (Toujeo SoloStar) 300 UNIT/ML solution pen-injector injection Inject 60 Units under the skin into the appropriate area as directed Every Night. 9 mL 3    levothyroxine (SYNTHROID, LEVOTHROID) 200 MCG tablet Take 1 tablet by mouth Every Morning. 90 tablet 1    meloxicam (MOBIC) 15 MG tablet Take 1 tablet by mouth Daily.      metFORMIN ER (GLUCOPHAGE-XR) 500 MG 24 hr tablet Take 1 tablet by mouth Daily With Breakfast.      Semaglutide, 1 MG/DOSE, (Ozempic, 1 MG/DOSE,) 4 MG/3ML solution pen-injector Inject 1 mg under the skin into the appropriate area as directed 1 (One) Time Per Week. 3 mL 3    simvastatin (ZOCOR) 80 MG tablet Take 1 tablet by mouth once daily 90 tablet 3    Dulaglutide (Trulicity) 0.75 MG/0.5ML solution pen-injector Inject 0.75 mg into the skin once a week (Patient not taking: Reported on 2/13/2025) 6 mL 3    HumaLOG KwikPen 100 UNIT/ML solution pen-injector Inject 6 units for blood sugar 250-300, 12 units for blood sugar 300-350, 18 units for blood sugar over 350 3-4 times daily as directed (Patient not taking: Reported on 2/13/2025) 15 mL 3    insulin aspart (NovoLOG FlexPen) 100 UNIT/ML solution pen-injector sc pen Inject 6 units for blood sugar 250-300, 12 units for blood sugar 300-350, 18 units for blood sugar over 350  3-4 times daily as directed (Patient not taking: Reported on 2/13/2025) 15 mL 3    Insulin Glargine (BASAGLAR KWIKPEN) 100 UNIT/ML injection pen Inject 75 units under the skin into the appropriate area as directed nightly. (Patient not taking: Reported on 2/13/2025) 15 mL 3    NovoLOG FlexPen 100 UNIT/ML solution pen-injector sc pen Inject 6 units for blood sugar 250-300, 12 units for blood sugar 300-350, 18 units for blood sugar over 350  3-4 times daily as directed (Patient not taking: Reported on 2/13/2025) 15 mL 3    silver sulfadiazine (SILVADENE, SSD) 1 % cream Apply 1 Application topically to the appropriate area  as directed Daily. (Patient not taking: Reported on 2/13/2025) 50 g 1     No current facility-administered medications for this visit.     Review of Systems   Constitutional:  Negative for chills and fever.   HENT:  Negative for congestion.    Respiratory:  Negative for shortness of breath.    Cardiovascular:  Negative for chest pain and leg swelling.   Gastrointestinal:  Negative for constipation, diarrhea, nausea and vomiting.   Musculoskeletal:  Positive for arthralgias and gait problem.   Skin:  Negative for wound.   Neurological:  Positive for numbness.   Psychiatric/Behavioral:  Negative for agitation.        OBJECTIVE     Vitals:    02/13/25 0911   BP: 122/70   Pulse: 95   SpO2: 96%         PHYSICAL EXAM  GEN:   Accompanied by daughter.     Foot/Ankle Exam    GENERAL  Diabetic foot exam performed    Appearance:  appears stated age and obese  Orientation:  AAOx3  Affect:  appropriate  Gait:  (somehat unsteady)  Assistance:  cane use  Right shoe gear: casual shoe  Left shoe gear: casual shoe    VASCULAR     Right Foot Vascularity   Dorsalis pedis:  1+  Posterior tibial:  1+  Skin temperature:  warm  Edema grading:  None  CFT:  4  Pedal hair growth:  Absent  Varicosities:  mild varicosities     Left Foot Vascularity   Dorsalis pedis:  1+  Posterior tibial:  1+  Skin temperature:  warm  Edema grading:  None  CFT:  4  Pedal hair growth:  Absent  Varicosities:  mild varicosities     NEUROLOGIC     Right Foot Neurologic   Light touch sensation: diminished  Vibratory sensation: diminished  Hot/Cold sensation: diminished  Protective Sensation using [place] Monofilament:   Sites intact: 5  Sites tested: 10     Left Foot Neurologic   Light touch sensation: diminished  Vibratory sensation: diminished  Hot/Cold sensation:  diminished  Protective Sensation using [place] Monofilament:   Sites intact: 5  Sites tested: 10    MUSCULOSKELETAL     Right Foot Musculoskeletal   Ecchymosis:  none  Tenderness:   none    Arch:  Normal     Left Foot Musculoskeletal   Ecchymosis:  none  Tenderness:  none  Arch:  Normal    MUSCLE STRENGTH     Right Foot Muscle Strength   Foot dorsiflexion:  5  Foot plantar flexion:  5  Foot inversion:  5  Foot eversion:  5     Left Foot Muscle Strength   Foot dorsiflexion:  5  Foot plantar flexion:  5  Foot inversion:  5  Foot eversion:  5    RANGE OF MOTION     Right Foot Range of Motion   Foot and ankle ROM within normal limits       Left Foot Range of Motion   Foot and ankle ROM within normal limits      DERMATOLOGIC      Right Foot Dermatologic   Skin  Positive for dryness.   Nails  1.  Positive for elongated, onychomycosis, abnormal thickness and subungual debris.  2.  Positive for elongated, abnormal thickness and subungual debris.  3.  Positive for elongated, abnormal thickness and subungual debris.  4.  Positive for elongated, abnormal thickness and subungual debris.  5.  Positive for elongated, abnormal thickness and subungual debris.     Left Foot Dermatologic   Skin  Positive for dryness.   Nails  1.  Positive for elongated, onychomycosis, abnormal thickness and subungual debris.  2.  Positive for elongated, abnormal thickness and subungual debris.  3.  Positive for elongated, abnormal thickness and subungual debris.  4.  Positive for elongated, abnormally thick and subungual debris.  5.  Positive for elongated, abnormally thick and subungual debris.      RADIOLOGY/NUCLEAR:  No results found.    LABORATORY/CULTURE RESULTS:      PATHOLOGY RESULTS:       ASSESSMENT/PLAN     Diagnoses and all orders for this visit:    1. Onychomycosis (Primary)    2. Type 2 diabetes mellitus with diabetic neuropathy, with long-term current use of insulin    3. Thickened nails    4. Gait difficulty    5. Cognitive dysfunction    6. Encounter for diabetic foot exam      Comprehensive lower extremity examination and evaluation was performed.  Discussed findings and treatment plan including risks, benefits,  and treatment options with patient in detail. Patient agreed with treatment plan.  Reviewed PCP notes.   Reviewed Hemoglobin A1C.   Diabetic foot exam performed.   After verbal consent obtained, nail(s) x10 debrided of length and thickness with nail nipper without incidence  Patient may maintain nails and calluses at home utilizing emery board or pumice stone between visits as needed  Reviewed at home diabetic foot care including daily foot checks   Continue use of cane for gait support.  Continue to work with PCP on improved BG control.  An After Visit Summary was printed and given to the patient at discharge, including (if requested) any available informative/educational handouts regarding diagnosis, treatment, or medications. All questions were answered to patient/family satisfaction. Should symptoms fail to improve or worsen they agree to call or return to clinic or to go to the Emergency Department. Discussed the importance of following up with any needed screening tests/labs/specialist appointments and any requested follow-up recommended by me today. Importance of maintaining follow-up discussed and patient accepts that missed appointments can delay diagnosis and potentially lead to worsening of conditions.  Return in about 3 months (around 5/13/2025) for Schedule Foot Care Clinic, With Yasmeen GILL., or sooner if acute issues arise.      This document has been electronically signed by BRIDGET Jaime on February 13, 2025 09:36 CST

## 2025-02-13 ENCOUNTER — OFFICE VISIT (OUTPATIENT)
Age: 65
End: 2025-02-13
Payer: MEDICARE

## 2025-02-13 ENCOUNTER — HOSPITAL ENCOUNTER (OUTPATIENT)
Dept: WOMENS IMAGING | Age: 65
Discharge: HOME | End: 2025-02-13
Attending: INTERNAL MEDICINE
Payer: MEDICARE

## 2025-02-13 VITALS — BODY MASS INDEX: 43.95 KG/M2 | WEIGHT: 280 LBS | HEIGHT: 67 IN

## 2025-02-13 VITALS
DIASTOLIC BLOOD PRESSURE: 70 MMHG | WEIGHT: 251 LBS | BODY MASS INDEX: 39.39 KG/M2 | HEIGHT: 67 IN | SYSTOLIC BLOOD PRESSURE: 122 MMHG | HEART RATE: 95 BPM | OXYGEN SATURATION: 96 %

## 2025-02-13 DIAGNOSIS — Z12.31 SCREENING MAMMOGRAM FOR BREAST CANCER: ICD-10-CM

## 2025-02-13 DIAGNOSIS — L60.2 THICKENED NAILS: ICD-10-CM

## 2025-02-13 DIAGNOSIS — E11.40 TYPE 2 DIABETES MELLITUS WITH DIABETIC NEUROPATHY, WITH LONG-TERM CURRENT USE OF INSULIN: ICD-10-CM

## 2025-02-13 DIAGNOSIS — F09 COGNITIVE DYSFUNCTION: ICD-10-CM

## 2025-02-13 DIAGNOSIS — E11.9 ENCOUNTER FOR DIABETIC FOOT EXAM: ICD-10-CM

## 2025-02-13 DIAGNOSIS — Z79.4 TYPE 2 DIABETES MELLITUS WITH DIABETIC NEUROPATHY, WITH LONG-TERM CURRENT USE OF INSULIN: ICD-10-CM

## 2025-02-13 DIAGNOSIS — B35.1 ONYCHOMYCOSIS: Primary | ICD-10-CM

## 2025-02-13 DIAGNOSIS — R26.9 GAIT DIFFICULTY: ICD-10-CM

## 2025-02-13 PROCEDURE — 77063 BREAST TOMOSYNTHESIS BI: CPT

## 2025-02-13 RX ORDER — METFORMIN HYDROCHLORIDE 500 MG/1
500 TABLET, EXTENDED RELEASE ORAL
COMMUNITY
Start: 2025-02-04

## 2025-02-13 RX ORDER — MELOXICAM 15 MG/1
15 TABLET ORAL DAILY
COMMUNITY

## 2025-03-19 DIAGNOSIS — M15.9 GENERALIZED OSTEOARTHRITIS: ICD-10-CM

## 2025-03-19 RX ORDER — MELOXICAM 15 MG/1
15 TABLET ORAL DAILY
Qty: 90 TABLET | Refills: 1 | Status: SHIPPED | OUTPATIENT
Start: 2025-03-19

## 2025-03-19 RX ORDER — SIMVASTATIN 80 MG
80 TABLET ORAL DAILY
Qty: 90 TABLET | Refills: 3 | Status: SHIPPED | OUTPATIENT
Start: 2025-03-19

## 2025-03-24 ENCOUNTER — TELEPHONE (OUTPATIENT)
Dept: PHARMACY | Facility: CLINIC | Age: 65
End: 2025-03-24

## 2025-03-24 NOTE — TELEPHONE ENCOUNTER
[facility]: STATIN THERAPY REVIEW  Identified Statin Use In Diabetes care gap per [place] Records dated:03/24/25     Statin Use in Persons with Diabetes Definition:  Eligible:Members with diabetes ages 40-75 during the measurement year    Definition: Medicare members with diabetes ages 40-75 who receive at least 1 fill of a statin medication in the measurement year   Members with diabetes are defined as those who have at least 2 fills of diabetes medications during the measurement year     Compliance: To comply with this measure, a member with diabetes must have a fill for at least 1 statin or statin combination medication in any strength or dose using their Part D benefit during the measurement year.     Last Visit:  2/3/2025  Next Visit:  5/22/2025        STATIN GAP IDENTIFIED-SUPD    Patient prescribed a statin:yes - Simvastatin 80mg  Per chart review:   Chart Review: Statin is marked as \"Taking\" and Last office visit and active on patient medication list.    Allergies   Allergen Reactions    Compazine [Prochlorperazine Maleate] Anaphylaxis       LIPID EVALUATION AND GUIDELINE ASSESSMENT    Lab Results   Component Value Date/Time    CHOL 142 09/06/2024 04:15 PM    TRIG 163 09/06/2024 04:15 PM    HDL 66 02/03/2025 10:05 AM    CHOLFAST 182 02/03/2025 10:05 AM    TRIGLYCFAST 133 02/03/2025 10:05 AM    LDL 89 02/03/2025 10:05 AM    LDL 95 02/27/2024 12:11 PM    ALT 16 02/03/2025 10:05 AM    AST 18 02/03/2025 10:05 AM     The 10-year ASCVD risk score (Milo SARAH, et al., 2019) is: 6.4%    Values used to calculate the score:      Age: 64 years      Sex: Female      Is Non- : No      Diabetic: Yes      Tobacco smoker: No      Systolic Blood Pressure: 112 mmHg      Is BP treated: No      HDL Cholesterol: 66 mg/dL      Total Cholesterol: 182 mg/dL         STATIN GAP PLAN  The following are interventions that have been identified:  SUPD Gap Identified from [place] Records

## 2025-03-26 NOTE — TELEPHONE ENCOUNTER
Called [facility] to confirm receipt of Simvastatin order.      Confirmed Simvastatin 80mg last fill date of 3/19/2025 and billed to [employer] insurance with paid claim of $0 for 90ds; already picked up.  Confirmed prior fill date was 10/2/2024 for 90ds.      Attempting to reach patient to review.  Left message asking for return call - to review how patient is currently taking Simvastatin.    Nomaninihart message not read yet.      Lindsey Stroud, PharmD., [facility]  Population Health Pharmacist  [facility]  Department, toll free: [phone], option 1

## 2025-03-28 ENCOUNTER — TELEPHONE (OUTPATIENT)
Dept: INTERNAL MEDICINE | Age: 65
End: 2025-03-28

## 2025-03-28 NOTE — TELEPHONE ENCOUNTER
Daughter called requesting a rx to be sent to [facility] for Ozempic.  States she is still awaiting for Patient Assistance to be approved so she guess she will just have to pay for it until they get approval.She would like a call back.

## 2025-03-29 DIAGNOSIS — E11.65 UNCONTROLLED TYPE 2 DIABETES MELLITUS WITH HYPERGLYCEMIA (HCC): ICD-10-CM

## 2025-04-02 NOTE — TELEPHONE ENCOUNTER
Alayna message ready by patient at 4:50PM on 3/29/2025.    Closing encounter.  No further outreach at this time.    Last Visit: 2/3/2025  Next Visit:  5/22/2025      Lindsey Stroud PharmD., BCACP  Population Health Pharmacist  [facility]  Department, toll free: [phone], option 1   =======================================================  For Pharmacy Admin Tracking Only    Program: Pop Health  CPA in place:  No  Gap Closed?: Yes   Time Spent (min): 10

## 2025-04-29 ENCOUNTER — HOSPITAL ENCOUNTER (EMERGENCY)
Age: 65
Discharge: HOME | End: 2025-04-29
Attending: EMERGENCY MEDICINE
Payer: MEDICARE

## 2025-04-29 ENCOUNTER — APPOINTMENT (OUTPATIENT)
Dept: CT IMAGING | Age: 65
End: 2025-04-29
Payer: MEDICARE

## 2025-04-29 VITALS
SYSTOLIC BLOOD PRESSURE: 127 MMHG | WEIGHT: 280 LBS | OXYGEN SATURATION: 97 % | RESPIRATION RATE: 18 BRPM | BODY MASS INDEX: 43.85 KG/M2 | TEMPERATURE: 98.3 F | DIASTOLIC BLOOD PRESSURE: 74 MMHG | HEART RATE: 89 BPM

## 2025-04-29 DIAGNOSIS — R41.0 TRANSIENT CONFUSION: Primary | ICD-10-CM

## 2025-04-29 DIAGNOSIS — Z87.820 HISTORY OF TRAUMATIC BRAIN INJURY: ICD-10-CM

## 2025-04-29 LAB
ALBUMIN SERPL-MCNC: 4 G/DL (ref 3.5–5.2)
ALP SERPL-CCNC: 80 U/L (ref 35–104)
ALT SERPL-CCNC: 18 U/L (ref 10–35)
ANION GAP SERPL CALCULATED.3IONS-SCNC: 11 MMOL/L (ref 8–16)
AST SERPL-CCNC: 24 U/L (ref 10–35)
BASOPHILS # BLD: 0 K/UL (ref 0–0.2)
BASOPHILS NFR BLD: 0.4 % (ref 0–1)
BILIRUB SERPL-MCNC: 0.5 MG/DL (ref 0.2–1.2)
BUN SERPL-MCNC: 27 MG/DL (ref 8–23)
CALCIUM SERPL-MCNC: 9.2 MG/DL (ref 8.8–10.2)
CHLORIDE SERPL-SCNC: 104 MMOL/L (ref 98–107)
CO2 SERPL-SCNC: 27 MMOL/L (ref 22–29)
CREAT SERPL-MCNC: 0.8 MG/DL (ref 0.5–0.9)
EKG P AXIS: 35 DEGREES
EKG P-R INTERVAL: 160 MS
EKG Q-T INTERVAL: 342 MS
EKG QRS DURATION: 86 MS
EKG QTC CALCULATION (BAZETT): 377 MS
EKG T AXIS: 50 DEGREES
EOSINOPHIL # BLD: 0.1 K/UL (ref 0–0.6)
EOSINOPHIL NFR BLD: 1.3 % (ref 0–5)
ERYTHROCYTE [DISTWIDTH] IN BLOOD BY AUTOMATED COUNT: 13 % (ref 11.5–14.5)
GLUCOSE BLD-MCNC: 104 MG/DL
GLUCOSE BLD-MCNC: 105 MG/DL (ref 70–99)
GLUCOSE SERPL-MCNC: 114 MG/DL (ref 70–99)
HCT VFR BLD AUTO: 42.7 % (ref 37–47)
HGB BLD-MCNC: 14.2 G/DL (ref 12–16)
IMM GRANULOCYTES # BLD: 0 K/UL
LYMPHOCYTES # BLD: 2 K/UL (ref 1.1–4.5)
LYMPHOCYTES NFR BLD: 25.6 % (ref 20–40)
MCH RBC QN AUTO: 30.4 PG (ref 27–31)
MCHC RBC AUTO-ENTMCNC: 33.3 G/DL (ref 33–37)
MCV RBC AUTO: 91.4 FL (ref 81–99)
MONOCYTES # BLD: 0.7 K/UL (ref 0–0.9)
MONOCYTES NFR BLD: 9.5 % (ref 0–10)
NEUTROPHILS # BLD: 4.8 K/UL (ref 1.5–7.5)
NEUTS SEG NFR BLD: 63.1 % (ref 50–65)
PERFORMED ON: ABNORMAL
PLATELET # BLD AUTO: 300 K/UL (ref 130–400)
PMV BLD AUTO: 9.8 FL (ref 9.4–12.3)
POTASSIUM SERPL-SCNC: 3.6 MMOL/L (ref 3.5–5.1)
PROT SERPL-MCNC: 6.8 G/DL (ref 6.4–8.3)
RBC # BLD AUTO: 4.67 M/UL (ref 4.2–5.4)
SODIUM SERPL-SCNC: 142 MMOL/L (ref 136–145)
WBC # BLD AUTO: 7.7 K/UL (ref 4.8–10.8)

## 2025-04-29 PROCEDURE — 70450 CT HEAD/BRAIN W/O DYE: CPT

## 2025-04-29 PROCEDURE — 93005 ELECTROCARDIOGRAM TRACING: CPT | Performed by: EMERGENCY MEDICINE

## 2025-04-29 PROCEDURE — 36415 COLL VENOUS BLD VENIPUNCTURE: CPT

## 2025-04-29 PROCEDURE — 99284 EMERGENCY DEPT VISIT MOD MDM: CPT

## 2025-04-29 PROCEDURE — 93010 ELECTROCARDIOGRAM REPORT: CPT | Performed by: INTERNAL MEDICINE

## 2025-04-29 PROCEDURE — 82962 GLUCOSE BLOOD TEST: CPT

## 2025-04-29 PROCEDURE — 80053 COMPREHEN METABOLIC PANEL: CPT

## 2025-04-29 PROCEDURE — 85025 COMPLETE CBC W/AUTO DIFF WBC: CPT

## 2025-04-29 ASSESSMENT — PAIN - FUNCTIONAL ASSESSMENT: PAIN_FUNCTIONAL_ASSESSMENT: NONE - DENIES PAIN
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
## 2025-05-07 ASSESSMENT — ENCOUNTER SYMPTOMS
ABDOMINAL PAIN: 0
SHORTNESS OF BREATH: 0

## 2025-05-16 NOTE — PROGRESS NOTES
[facility] - PODIATRY    Today's Date: 05/22/2025     Patient Name: Gia Mcqueen  MRN: [id number]  CSN: [id number]  PCP: Jackie Hoyt MD  Referring Provider: No ref. provider found    SUBJECTIVE     Chief Complaint   Patient presents with    Follow-up     Jackie Hoyt MD 03/28/25  Return in about 3 months for Foot Care Clinic  Pt here for nail/foot care. Pt reports no pain or issues.     HPI: Gia Mcqueen, a 64 y.o.female, comes to clinic as a(n) established patient presenting for diabetic foot exam and complaining of toenail/callus issues. Patient has h/o DM2, Obesity, OA, hypothyroid, hypercholesterolemia .  Patient presents with family member.  Patient has difficulty with memory issues and is a poor historian. Patient is IDDM and unsure of last BG level.  Patient began to become diaphoretic and symptomatic for hypoglycemia.  Patient was given snacks and soda by her caregiver.  Her blood glucose was checked with in office glucometer with a blood sugar of 66.  Caregiver reports patient had not eaten breakfast yet this morning.  Last A1c of 6.7%.  Uncertain on amount of numbness to feet. Denies open wounds or sores. Patient's daughter notes a color change to the patient's distal bilateral hallux.  Patient denies pain. Notes that her toenails are long, thick and discolored. She has trouble caring for them herself. Uses a cane for gait support. Denies pain. Denies previous treatment. Denies any constitutional symptoms. No other pedal complaints at this time.    Past Medical History:   Diagnosis Date    Acquired hypothyroidism     Closed head injury     Diabetes mellitus     Essential hypertension     Osteoarthritis      History reviewed. No pertinent surgical history.  History reviewed. No pertinent family history.  Social History     Socioeconomic History    Marital status:    Tobacco Use    Smoking status: Never     Passive exposure: Never    Smokeless tobacco: Never   Vaping Use     Vaping status: Never Used   Substance and Sexual Activity    Alcohol use: Never    Drug use: Defer    Sexual activity: Defer     Allergies   Allergen Reactions    Compazine [Prochlorperazine] Nausea And Vomiting     Current Outpatient Medications   Medication Sig Dispense Refill    DULoxetine (CYMBALTA) 30 MG capsule Take 1 capsule by mouth daily 30 capsule 1    Insulin Glargine, 1 Unit Dial, (Toujeo SoloStar) 300 UNIT/ML solution pen-injector injection Inject 60 Units under the skin into the appropriate area as directed Every Night. 9 mL 3    levothyroxine (SYNTHROID, LEVOTHROID) 200 MCG tablet Take 1 tablet by mouth Every Morning. 90 tablet 1    meloxicam (MOBIC) 15 MG tablet Take 1 tablet by mouth Daily.      metFORMIN ER (GLUCOPHAGE-XR) 500 MG 24 hr tablet Take 1 tablet by mouth Daily With Breakfast.      Semaglutide, 1 MG/DOSE, (Ozempic, 1 MG/DOSE,) 4 MG/3ML solution pen-injector Inject 1 mg under the skin into the appropriate area as directed 1 (One) Time Per Week. 3 mL 3    simvastatin (ZOCOR) 80 MG tablet Take 1 tablet by mouth once daily 90 tablet 3    Dulaglutide (Trulicity) 0.75 MG/0.5ML solution pen-injector Inject 0.75 mg into the skin once a week (Patient not taking: Reported on 5/22/2025) 6 mL 3    HumaLOG KwikPen 100 UNIT/ML solution pen-injector Inject 6 units for blood sugar 250-300, 12 units for blood sugar 300-350, 18 units for blood sugar over 350 3-4 times daily as directed (Patient not taking: Reported on 5/22/2025) 15 mL 3    insulin aspart (NovoLOG FlexPen) 100 UNIT/ML solution pen-injector sc pen Inject 6 units for blood sugar 250-300, 12 units for blood sugar 300-350, 18 units for blood sugar over 350  3-4 times daily as directed (Patient not taking: Reported on 5/22/2025) 15 mL 3    Insulin Glargine (BASAGLAR KWIKPEN) 100 UNIT/ML injection pen Inject 75 units under the skin into the appropriate area as directed nightly. (Patient not taking: Reported on 5/22/2025) 15 mL 3    NovoLOG  FlexPen 100 UNIT/ML solution pen-injector sc pen Inject 6 units for blood sugar 250-300, 12 units for blood sugar 300-350, 18 units for blood sugar over 350  3-4 times daily as directed (Patient not taking: Reported on 5/22/2025) 15 mL 3    silver sulfadiazine (SILVADENE, SSD) 1 % cream Apply 1 Application topically to the appropriate area as directed Daily. (Patient not taking: Reported on 5/22/2025) 50 g 1     No current facility-administered medications for this visit.     Review of Systems   Constitutional:  Negative for chills and fever.   HENT:  Negative for congestion.    Respiratory:  Negative for shortness of breath.    Cardiovascular:  Negative for chest pain and leg swelling.   Gastrointestinal:  Negative for constipation, diarrhea, nausea and vomiting.   Musculoskeletal:  Positive for arthralgias and gait problem.   Skin:  Negative for wound.   Neurological:  Positive for numbness.   Psychiatric/Behavioral:  Negative for agitation.        OBJECTIVE     Vitals:    05/22/25 0906   BP: 136/86   Pulse: 64   SpO2: 96%         PHYSICAL EXAM  GEN:   Accompanied by daughter.     Foot/Ankle Exam    GENERAL  Diabetic foot exam performed    Appearance:  appears stated age and obese  Orientation:  AAOx3  Affect:  appropriate  Gait:  (somehat unsteady)  Assistance:  cane use  Right shoe gear: casual shoe  Left shoe gear: casual shoe    VASCULAR     Right Foot Vascularity   Dorsalis pedis:  1+  Posterior tibial:  1+  Skin temperature:  warm  Edema grading:  None  CFT:  4  Pedal hair growth:  Absent  Varicosities:  mild varicosities     Left Foot Vascularity   Dorsalis pedis:  1+  Posterior tibial:  1+  Skin temperature:  warm  Edema grading:  None  CFT:  4  Pedal hair growth:  Absent  Varicosities:  mild varicosities     NEUROLOGIC     Right Foot Neurologic   Light touch sensation: diminished  Vibratory sensation: diminished  Hot/Cold sensation: diminished  Protective Sensation using [place] Monofilament:    Sites intact: 5  Sites tested: 10     Left Foot Neurologic   Light touch sensation: diminished  Vibratory sensation: diminished  Hot/Cold sensation:  diminished  Protective Sensation using [place] Monofilament:   Sites intact: 5  Sites tested: 10    MUSCULOSKELETAL     Right Foot Musculoskeletal   Ecchymosis:  none  Tenderness:  none    Arch:  Normal     Left Foot Musculoskeletal   Ecchymosis:  none  Tenderness:  none  Arch:  Normal    MUSCLE STRENGTH     Right Foot Muscle Strength   Foot dorsiflexion:  5  Foot plantar flexion:  5  Foot inversion:  5  Foot eversion:  5     Left Foot Muscle Strength   Foot dorsiflexion:  5  Foot plantar flexion:  5  Foot inversion:  5  Foot eversion:  5    RANGE OF MOTION     Right Foot Range of Motion   Foot and ankle ROM within normal limits       Left Foot Range of Motion   Foot and ankle ROM within normal limits      DERMATOLOGIC      Right Foot Dermatologic   Skin  Positive for dryness.   Nails  1.  Positive for elongated, onychomycosis, abnormal thickness and subungual debris.  2.  Positive for elongated, abnormal thickness and subungual debris.  3.  Positive for elongated, abnormal thickness and subungual debris.  4.  Positive for elongated, abnormal thickness and subungual debris.  5.  Positive for elongated, abnormal thickness and subungual debris.     Left Foot Dermatologic   Skin  Positive for dryness.   Nails  1.  Positive for elongated, onychomycosis, abnormal thickness and subungual debris.  2.  Positive for elongated, abnormal thickness and subungual debris.  3.  Positive for elongated, abnormal thickness and subungual debris.  4.  Positive for elongated, abnormally thick and subungual debris.  5.  Positive for elongated, abnormally thick and subungual debris.      RADIOLOGY/NUCLEAR:  CT Head Without Contrast  Result Date: 4/29/2025  Narrative: EXAMINATION: HEAD CT WITHOUT CONTRAST HISTORY: Altered mental status. TECHNIQUE: Noncontrast CT of the brain was  performed with images acquired from skull base to vertex. 2-D coronal and sagittal reformatted images were obtained from the axial source images. Contrast Dose: None. CT Dose Reduction Techniques Performed: Yes. COMPARISON: MR 10/07/2024. CT 09/11/2024. FINDINGS: Topogram demonstrates no significant abnormality. Intraparenchymal hemorrhage: None. Parenchyma: Normal gray-white differentiation.  No mass effect or midline shift. Age-related cerebral atrophy. Periventricular white matter ischemic change consistent with small vessel disease. Previous lacunar infarct seen in each basal ganglia. There is slight decreased attenuation in the right inferior temporal and occipital lobe compared to the left side. Extra-axial spaces and basal cisterns: Normal. Ventricles: Normal size and morphology for age. Paranasal sinuses and mastoid air cells: Visualized portions of paranasal sinuses are clear.  Mastoid air cells are clear. Orbits: Normal visualized portions. Sella/Skull Base: Normal. Other: Scalp and visualized soft tissues are normal.  Calvarium is normal. Mild rightward deviation nasal septum.    Impression: 1.  No intracranial hemorrhage. 2.  There is slight decreased attenuation in the right inferior temporal and occipital lobe compared to the left side. This appears be due to some bone artifact and the head placed slightly asymmetry in the CT scanner. If there is clinical concern for any indication of ischemia, suggest follow up MR of the brain for further evaluation. 3. . The remaining brain shows senescent changes. All CT scans are performed using dose optimization techniques as appropriate to the performed exam and include at least one of the following: Automated exposure control, adjustment of the mA and/or kV according to size, and the use of iterative reconstruction technique. ______________________________________ Electronically signed by: SOFÍA TENA M.D. Date:     04/29/2025 Time:    09:44  "      LABORATORY/CULTURE RESULTS:      PATHOLOGY RESULTS:       ASSESSMENT/PLAN     Diagnoses and all orders for this visit:    1. Type 2 diabetes mellitus with diabetic neuropathy, with long-term current use of insulin (Primary)  -     POC Glucose Fingerstick    2. Onychomycosis    3. Thickened nails    4. Gait difficulty    5. Cognitive dysfunction    6. Decreased pedal pulses        Comprehensive lower extremity examination and evaluation was performed.  Discussed findings and treatment plan including risks, benefits, and treatment options with patient in detail. Patient agreed with treatment plan.  Hemoglobin A1c reviewed.  CMP reviewed.  POC glucose fingerstick ordered today due to symptomatic hypoglycemia.  After verbal consent obtained, nail(s) x10 debrided of length and thickness with nail nipper without incidence  Patient may maintain nails and calluses at home utilizing emery board or pumice stone between visits as needed  Reviewed at home diabetic foot care including daily foot checks   Continue use of cane for gait support.  Continue to work with PCP on improved BG control.  Patient's daughter reports that the patient has an appointment with her PCP today.  Daughter and patient will discuss with PCP hypoglycemic incident.  Patient is able to walk and stand without any difficulty after eating multiple snacks.  Patient reports that she \"feels much better \".  Discussed with patient and daughter that vascular testing can be ordered due to decreased pedal pulses.  Patient does have palpable pulses so do not feel that there is an emergent need at this time.  Daughter would like to discuss options with PCP before having any testing ordered.  Patient or daughter may contact the office if she would like to have vascular testing ordered before next appointment.  Encourage patient to call with any questions or concerns before next appointment.  An After Visit Summary was printed and given to the patient at " discharge, including (if requested) any available informative/educational handouts regarding diagnosis, treatment, or medications. All questions were answered to patient/family satisfaction. Should symptoms fail to improve or worsen they agree to call or return to clinic or to go to the Emergency Department. Discussed the importance of following up with any needed screening tests/labs/specialist appointments and any requested follow-up recommended by me today. Importance of maintaining follow-up discussed and patient accepts that missed appointments can delay diagnosis and potentially lead to worsening of conditions.  Return in about 3 months (around 8/22/2025) for Diabetic foot care clinic, With Yasmeen GILL., or sooner if acute issues arise.      This document has been electronically signed by BRIDGET Jaime on May 22, 2025 13:22 CDT

## 2025-05-19 ENCOUNTER — TELEPHONE (OUTPATIENT)
Dept: PHARMACY | Facility: CLINIC | Age: 65
End: 2025-05-19

## 2025-05-19 NOTE — TELEPHONE ENCOUNTER
[facility] CLINICAL PHARMACY: ADHERENCE REVIEW  Identified care gap per Galliano: fills at [facility]: Diabetes adherence    Patient also appears to be prescribed: Statin    ASSESSMENT  DIABETES ADHERENCE    Insurance Records claims through 25 (Prior Year PDC = not reported; YTD PDC = FIRST FILL; Potential Fail Date: 07/10/25):   METFORMIN HCL  MG TABLET last filled on 25 for 90 day supply. Next refill due: 25    Prescribed si tablet/capsule daily with breakfast    Per Insurer Portal: last filled on 25 for 90 day supply.     Per [facility]: not contacted  [facility] RX#:[id number] (Store#:[id number])    Lab Results   Component Value Date    LABA1C 8.2 (H) 2025    LABA1C 8.2 (H) 2024    LABA1C 7.8 (H) 2024     NOTE: A1c <9%    PLAN    The following are interventions that have been identified:   Patient OVERDUE refilling METFORMIN HCL  MG TABLET and active on home medication list.     Attempting to reach patient to review.  Left message asking for return call. Haloadhart message sent to patient.    Last Visit: 25  Next Visit: 25    [facility]   [facility]  [phone] Option 1     For Pharmacy Admin Tracking Only    Program: [facility] [place]  CPA in place:  No  Gap Closed?: No   Time Spent (min): 5

## 2025-05-20 DIAGNOSIS — E11.65 UNCONTROLLED TYPE 2 DIABETES MELLITUS WITH HYPERGLYCEMIA (HCC): Primary | ICD-10-CM

## 2025-05-20 DIAGNOSIS — E11.65 UNCONTROLLED TYPE 2 DIABETES MELLITUS WITH HYPERGLYCEMIA (HCC): ICD-10-CM

## 2025-05-20 DIAGNOSIS — E78.2 MIXED HYPERLIPIDEMIA: ICD-10-CM

## 2025-05-20 LAB
ALBUMIN SERPL-MCNC: 3.9 G/DL (ref 3.5–5.2)
ALP SERPL-CCNC: 91 U/L (ref 35–104)
ALT SERPL-CCNC: 18 U/L (ref 10–35)
ANION GAP SERPL CALCULATED.3IONS-SCNC: 11 MMOL/L (ref 8–16)
AST SERPL-CCNC: 20 U/L (ref 10–35)
BILIRUB SERPL-MCNC: 0.5 MG/DL (ref 0.2–1.2)
BUN SERPL-MCNC: 12 MG/DL (ref 8–23)
CALCIUM SERPL-MCNC: 9.3 MG/DL (ref 8.8–10.2)
CHLORIDE SERPL-SCNC: 106 MMOL/L (ref 98–107)
CHOLEST SERPL-MCNC: 146 MG/DL (ref 0–199)
CO2 SERPL-SCNC: 26 MMOL/L (ref 22–29)
CREAT SERPL-MCNC: 0.7 MG/DL (ref 0.5–0.9)
ERYTHROCYTE [DISTWIDTH] IN BLOOD BY AUTOMATED COUNT: 12.9 % (ref 11.5–14.5)
GLUCOSE SERPL-MCNC: 147 MG/DL (ref 70–99)
HBA1C MFR BLD: 6.7 % (ref 4–5.6)
HCT VFR BLD AUTO: 41.3 % (ref 37–47)
HDLC SERPL-MCNC: 59 MG/DL (ref 40–60)
HGB BLD-MCNC: 13.7 G/DL (ref 12–16)
LDLC SERPL CALC-MCNC: 63 MG/DL
MCH RBC QN AUTO: 30.4 PG (ref 27–31)
MCHC RBC AUTO-ENTMCNC: 33.2 G/DL (ref 33–37)
MCV RBC AUTO: 91.8 FL (ref 81–99)
PLATELET # BLD AUTO: 314 K/UL (ref 130–400)
PMV BLD AUTO: 10.2 FL (ref 9.4–12.3)
POTASSIUM SERPL-SCNC: 4.3 MMOL/L (ref 3.5–5.1)
PROT SERPL-MCNC: 6.7 G/DL (ref 6.4–8.3)
RBC # BLD AUTO: 4.5 M/UL (ref 4.2–5.4)
SODIUM SERPL-SCNC: 143 MMOL/L (ref 136–145)
TRIGL SERPL-MCNC: 121 MG/DL (ref 0–149)
TSH SERPL DL<=0.005 MIU/L-ACNC: 0.09 UIU/ML (ref 0.27–4.2)
WBC # BLD AUTO: 7.5 K/UL (ref 4.8–10.8)

## 2025-05-22 ENCOUNTER — OFFICE VISIT (OUTPATIENT)
Age: 65
End: 2025-05-22
Payer: MEDICARE

## 2025-05-22 ENCOUNTER — OFFICE VISIT (OUTPATIENT)
Dept: INTERNAL MEDICINE | Age: 65
End: 2025-05-22
Payer: MEDICARE

## 2025-05-22 VITALS
HEART RATE: 64 BPM | SYSTOLIC BLOOD PRESSURE: 136 MMHG | HEIGHT: 67 IN | OXYGEN SATURATION: 96 % | DIASTOLIC BLOOD PRESSURE: 86 MMHG | WEIGHT: 251 LBS | BODY MASS INDEX: 39.39 KG/M2

## 2025-05-22 VITALS
HEART RATE: 78 BPM | HEIGHT: 67 IN | DIASTOLIC BLOOD PRESSURE: 66 MMHG | WEIGHT: 253 LBS | OXYGEN SATURATION: 98 % | SYSTOLIC BLOOD PRESSURE: 124 MMHG | BODY MASS INDEX: 39.71 KG/M2

## 2025-05-22 DIAGNOSIS — E11.40 TYPE 2 DIABETES MELLITUS WITH DIABETIC NEUROPATHY, WITH LONG-TERM CURRENT USE OF INSULIN: Primary | ICD-10-CM

## 2025-05-22 DIAGNOSIS — R09.89 DECREASED PEDAL PULSES: ICD-10-CM

## 2025-05-22 DIAGNOSIS — L60.2 THICKENED NAILS: ICD-10-CM

## 2025-05-22 DIAGNOSIS — E11.65 UNCONTROLLED TYPE 2 DIABETES MELLITUS WITH HYPERGLYCEMIA (HCC): ICD-10-CM

## 2025-05-22 DIAGNOSIS — E03.9 ACQUIRED HYPOTHYROIDISM: Primary | ICD-10-CM

## 2025-05-22 DIAGNOSIS — R26.9 GAIT DIFFICULTY: ICD-10-CM

## 2025-05-22 DIAGNOSIS — F09 COGNITIVE DYSFUNCTION: ICD-10-CM

## 2025-05-22 DIAGNOSIS — Z79.4 TYPE 2 DIABETES MELLITUS WITH DIABETIC NEUROPATHY, WITH LONG-TERM CURRENT USE OF INSULIN: Primary | ICD-10-CM

## 2025-05-22 DIAGNOSIS — Z87.820 HISTORY OF CLOSED HEAD INJURY: ICD-10-CM

## 2025-05-22 DIAGNOSIS — R41.3 MEMORY LOSS: ICD-10-CM

## 2025-05-22 DIAGNOSIS — B35.1 ONYCHOMYCOSIS: ICD-10-CM

## 2025-05-22 LAB — GLUCOSE BLDC GLUCOMTR-MCNC: 66 MG/DL (ref 70–130)

## 2025-05-22 PROCEDURE — 99214 OFFICE O/P EST MOD 30 MIN: CPT | Performed by: INTERNAL MEDICINE

## 2025-05-22 PROCEDURE — 3044F HG A1C LEVEL LT 7.0%: CPT | Performed by: INTERNAL MEDICINE

## 2025-05-22 PROCEDURE — 3078F DIAST BP <80 MM HG: CPT | Performed by: INTERNAL MEDICINE

## 2025-05-22 PROCEDURE — 3074F SYST BP LT 130 MM HG: CPT | Performed by: INTERNAL MEDICINE

## 2025-05-22 RX ORDER — INSULIN GLARGINE 300 U/ML
60 INJECTION, SOLUTION SUBCUTANEOUS NIGHTLY
Qty: 5 EACH | Refills: 1 | Status: SHIPPED | OUTPATIENT
Start: 2025-05-22

## 2025-05-22 RX ORDER — LEVOTHYROXINE SODIUM 150 UG/1
150 TABLET ORAL DAILY
Qty: 90 TABLET | Refills: 1 | Status: SHIPPED | OUTPATIENT
Start: 2025-05-22

## 2025-05-22 NOTE — PROGRESS NOTES
Chief Complaint   Patient presents with    Follow-up     Episode of low blood sugar, discontinued metformin for about 2-3 weeks and she is still having low blood sugar episodes, some concerns with bp       HPI:   History of Present Illness  The patient presents for evaluation of diabetes and thyroid management.    She has frequent morning hypoglycemia. Metformin was initiated 4 weeks ago but discontinued 3 weeks ago due to difficulty swallowing and diarrhea. She acclimated to metformin and has not had severe hypoglycemia for 2 weeks. Current diet includes eggs, Cheerios, chicken salad, Ritz crackers, snacks, and ice cream. A1c levels have improved. She receives ophthalmic injections.    Medication regimen includes Ozempic and insulin. Metformin was discontinued due to issues. She has not had diarrhea recently. Diabetes medications include Ozempic weekly and insulin, reduced from 70 to 60 units.    She was recently hospitalized due to confusion and wandering. 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 Switched to Medicare Advantage. Physical signs on the door remind her not to leave and to call someone.    She experienced low blood sugar episodes recently, with the last one 2 weeks ago. Took Ozempic yesterday and consumed 200 carbs. This is her third visit for this issue.       Past Medical History:   Diagnosis Date    Acquired hypothyroidism     Brain injury due to ischemia     Diabetes (HCC)     Episode of recurrent major depressive disorder 01/10/2021    Generalized osteoarthritis 03/21/2018    Hypothyroidism     Morbid obesity due to excess calories (HCC) 06/24/2018    Movement disorder     joint pain    Type 2 diabetes mellitus without complication, with long-term current use of insulin ([facility])        Past Surgical History:   Procedure Laterality Date    CHOLECYSTECTOMY      TUBAL LIGATION         Family History

## 2025-06-02 ENCOUNTER — CARE COORDINATION (OUTPATIENT)
Dept: CARE COORDINATION | Age: 65
End: 2025-06-02

## 2025-06-02 ASSESSMENT — SOCIAL DETERMINANTS OF HEALTH (SDOH)
DO YOU BELONG TO ANY CLUBS OR ORGANIZATIONS SUCH AS CHURCH GROUPS UNIONS, FRATERNAL OR ATHLETIC GROUPS, OR SCHOOL GROUPS?: NO
HOW OFTEN DO YOU ATTENT MEETINGS OF THE CLUB OR ORGANIZATION YOU BELONG TO?: NEVER
HOW OFTEN DO YOU GET TOGETHER WITH FRIENDS OR RELATIVES?: MORE THAN THREE TIMES A WEEK
HOW OFTEN DO YOU ATTEND CHURCH OR RELIGIOUS SERVICES?: NEVER
IN A TYPICAL WEEK, HOW MANY TIMES DO YOU TALK ON THE PHONE WITH FAMILY, FRIENDS, OR NEIGHBORS?: MORE THAN THREE TIMES A WEEK

## 2025-06-02 NOTE — ACP (ADVANCE CARE PLANNING)
Advance Care Planning   The patient has the following advanced directives on file:  Advance Directives       Power of  Living Will ACP-Advance Directive ACP-Power of     Not on File Not on File Not on File Not on File            The patient has appointed the following active healthcare agents:    Primary Decision Maker: Tere Lim - Kasia - [phone]    The Patient has the following current code status:    Code Status: Prior    Visit Documentation:  I called and spoke with Tere Lim regarding Advance Care Planning and Advance Care Directives, including Living Will and Health Care Power of .     I discussed Advance Care Planning with Tere Lim, patient's daughter, today which included the importance of making their choices for care and treatment in the case of a health event that adversely affects their decision-making abilities.  The patient has not completed the Advance Care Directives. She does not have an active health care agent at this time.  The daughter has a POA for the patient. The patient's daughter was encouraged to complete the declaration forms and provide a signed copy for the patient's medical records.      Brittany Polk RN  6/2/2025

## 2025-06-02 NOTE — CARE COORDINATION
Ambulatory Care Coordination Note     2025 4:57 PM     Patient Current Location:  Home: [address] [place] [id number]     This patient was received as a referral from Ambulatory Care Manager .    ACM contacted the family by telephone. Verified name and  with family as identifiers. Provided introduction to self, and explanation of the ACM role.   Family accepted care management services at this time.          ACM: Brittany Polk RN     Challenges to be reviewed by the provider   Additional needs identified to be addressed with provider Yes  Palliative Care referral             Method of communication with provider: chart routing.    Utilization: Initial Call - N/A    Care Summary Note: Patient's support is through her daughter, Tere.  Daughter has legal POA. Dtr relayed pt currently is living in large home that she cannot take care of and requires maintenance. Daughter comes to home daily and administers patient's medications, mows the yard for pt. Patient does not cook due to safety concerns, does use a microwave. Daughter is concerned that patient has a Medicare plan that is not working the best for the patient's situation but has been unable to get it switched back to pt's original Medicare. A son is also staying in the home but does not provide care to the patient - he works and has health problems as well. Daughter is concerned regarding long term situation for patient who cannot safely manage her own care or perform typical daily IADLs or ADLs due to increasing cognitive changes over time 2/2 a head injury. Daughter has been providing much support to patient, however daughter's pregnancy will eventually mean she cannot continue to provide the level of support patient needs daily.   Discussed hypoglycemia symptoms - dtr reported pt has sweats, feels weak and does understand s/s of low glucose and will eat something sweet. Encouraged to consider keeping glucose tablets in the home for ease of use and

## 2025-06-03 ENCOUNTER — TELEPHONE (OUTPATIENT)
Dept: INTERNAL MEDICINE | Age: 65
End: 2025-06-03

## 2025-06-03 DIAGNOSIS — Z87.820 HISTORY OF CLOSED HEAD INJURY: ICD-10-CM

## 2025-06-03 DIAGNOSIS — F09 COGNITIVE DYSFUNCTION: Primary | ICD-10-CM

## 2025-06-03 DIAGNOSIS — Z79.4 TYPE 2 DIABETES MELLITUS WITHOUT COMPLICATION, WITH LONG-TERM CURRENT USE OF INSULIN (HCC): ICD-10-CM

## 2025-06-03 DIAGNOSIS — E11.9 TYPE 2 DIABETES MELLITUS WITHOUT COMPLICATION, WITH LONG-TERM CURRENT USE OF INSULIN (HCC): ICD-10-CM

## 2025-06-03 NOTE — TELEPHONE ENCOUNTER
----- Message from GILBERTO THORPE RN sent at 6/2/2025  5:30 PM CDT -----  Dr. Florentino,  I am requesting a referral be sent to Palliative Care - the APRN can visit and refer to the  to aid daughter with options for care of patient.

## 2025-06-09 ENCOUNTER — CARE COORDINATION (OUTPATIENT)
Dept: CARE COORDINATION | Age: 65
End: 2025-06-09

## 2025-06-09 NOTE — CARE COORDINATION
Daughter would like resources for supporting patient in a safe living environment    Barriers: impairment:  cognitive  Plan for overcoming my barriers:   Daughter demonstrates understanding of resources available to patient  Daughter demonstrates willingness to connect with local entities to promote patient's safety  Daughter demonstrates willingness to assist pt with making lifestyle adjustments.  Confidence: 9/10  Anticipated Goal Completion Date: 8/2/25               PCP/Specialist follow up:   Future Appointments         Provider Specialty Dept Phone    6/11/2025 11:00 AM Lizette Avila APRN - [facility] [phone]            Follow Up:   Plan for next ACM outreach in approximately 2 weeks to complete:  - disease specific assessments  - SDOH assessments.   Caregiver is agreeable to this plan.

## 2025-06-10 ENCOUNTER — APPOINTMENT (OUTPATIENT)
Dept: CT IMAGING | Age: 65
End: 2025-06-10
Payer: MEDICARE

## 2025-06-10 ENCOUNTER — HOSPITAL ENCOUNTER (EMERGENCY)
Age: 65
Discharge: HOME | End: 2025-06-10
Attending: EMERGENCY MEDICINE
Payer: MEDICARE

## 2025-06-10 VITALS
OXYGEN SATURATION: 99 % | DIASTOLIC BLOOD PRESSURE: 71 MMHG | HEART RATE: 79 BPM | RESPIRATION RATE: 15 BRPM | SYSTOLIC BLOOD PRESSURE: 143 MMHG | TEMPERATURE: 97.8 F

## 2025-06-10 DIAGNOSIS — R42 VERTIGO: Primary | ICD-10-CM
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
## 2025-06-10 LAB
ALBUMIN SERPL-MCNC: 4.3 G/DL (ref 3.5–5.2)
ALP SERPL-CCNC: 111 U/L (ref 35–104)
ALT SERPL-CCNC: 19 U/L (ref 10–35)
ANION GAP SERPL CALCULATED.3IONS-SCNC: 15 MMOL/L (ref 8–16)
AST SERPL-CCNC: 27 U/L (ref 10–35)
BASOPHILS # BLD: 0 K/UL (ref 0–0.2)
BASOPHILS NFR BLD: 0.3 % (ref 0–1)
BILIRUB SERPL-MCNC: 0.5 MG/DL (ref 0.2–1.2)
BILIRUB UR QL STRIP: NEGATIVE
BUN SERPL-MCNC: 17 MG/DL (ref 8–23)
CALCIUM SERPL-MCNC: 9.7 MG/DL (ref 8.8–10.2)
CHLORIDE SERPL-SCNC: 101 MMOL/L (ref 98–107)
CLARITY UR: ABNORMAL
CO2 SERPL-SCNC: 23 MMOL/L (ref 22–29)
COLOR UR: YELLOW
CREAT SERPL-MCNC: 0.6 MG/DL (ref 0.5–0.9)
EKG P AXIS: 71 DEGREES
EKG P-R INTERVAL: 162 MS
EKG Q-T INTERVAL: 366 MS
EKG QRS DURATION: 88 MS
EKG QTC CALCULATION (BAZETT): 420 MS
EKG T AXIS: 32 DEGREES
EOSINOPHIL # BLD: 0 K/UL (ref 0–0.6)
EOSINOPHIL NFR BLD: 0.1 % (ref 0–5)
ERYTHROCYTE [DISTWIDTH] IN BLOOD BY AUTOMATED COUNT: 12.2 % (ref 11.5–14.5)
GLUCOSE SERPL-MCNC: 216 MG/DL (ref 70–99)
GLUCOSE UR STRIP.AUTO-MCNC: 500 MG/DL
HCT VFR BLD AUTO: 44 % (ref 37–47)
HGB BLD-MCNC: 15.2 G/DL (ref 12–16)
HGB UR STRIP.AUTO-MCNC: NEGATIVE MG/L
IMM GRANULOCYTES # BLD: 0 K/UL
KETONES UR STRIP.AUTO-MCNC: 15 MG/DL
LEUKOCYTE ESTERASE UR QL STRIP.AUTO: NEGATIVE
LIPASE SERPL-CCNC: 39 U/L (ref 13–60)
LYMPHOCYTES # BLD: 1.4 K/UL (ref 1.1–4.5)
LYMPHOCYTES NFR BLD: 12.9 % (ref 20–40)
MAGNESIUM SERPL-MCNC: 1.6 MG/DL (ref 1.6–2.4)
MCH RBC QN AUTO: 31.2 PG (ref 27–31)
MCHC RBC AUTO-ENTMCNC: 34.5 G/DL (ref 33–37)
MCV RBC AUTO: 90.3 FL (ref 81–99)
MONOCYTES # BLD: 0.6 K/UL (ref 0–0.9)
MONOCYTES NFR BLD: 5.4 % (ref 0–10)
NEUTROPHILS # BLD: 8.7 K/UL (ref 1.5–7.5)
NEUTS SEG NFR BLD: 80.9 % (ref 50–65)
NITRITE UR QL STRIP.AUTO: NEGATIVE
PH UR STRIP.AUTO: 8 [PH] (ref 5–8)
PLATELET # BLD AUTO: 334 K/UL (ref 130–400)
PMV BLD AUTO: 9.9 FL (ref 9.4–12.3)
POTASSIUM SERPL-SCNC: 3.7 MMOL/L (ref 3.5–5.1)
PROT SERPL-MCNC: 7.2 G/DL (ref 6.4–8.3)
PROT UR STRIP.AUTO-MCNC: NEGATIVE MG/DL
RBC # BLD AUTO: 4.87 M/UL (ref 4.2–5.4)
SODIUM SERPL-SCNC: 139 MMOL/L (ref 136–145)
SP GR UR STRIP.AUTO: 1.02 (ref 1–1.03)
UROBILINOGEN UR STRIP.AUTO-MCNC: 0.2 E.U./DL
WBC # BLD AUTO: 10.7 K/UL (ref 4.8–10.8)

## 2025-06-10 PROCEDURE — 80053 COMPREHEN METABOLIC PANEL: CPT

## 2025-06-10 PROCEDURE — 6370000000 HC RX 637 (ALT 250 FOR IP): Performed by: EMERGENCY MEDICINE

## 2025-06-10 PROCEDURE — 93010 ELECTROCARDIOGRAM REPORT: CPT | Performed by: INTERNAL MEDICINE

## 2025-06-10 PROCEDURE — 6360000002 HC RX W HCPCS: Performed by: EMERGENCY MEDICINE

## 2025-06-10 PROCEDURE — 99284 EMERGENCY DEPT VISIT MOD MDM: CPT

## 2025-06-10 PROCEDURE — 70450 CT HEAD/BRAIN W/O DYE: CPT

## 2025-06-10 PROCEDURE — 85025 COMPLETE CBC W/AUTO DIFF WBC: CPT

## 2025-06-10 PROCEDURE — 83735 ASSAY OF MAGNESIUM: CPT

## 2025-06-10 PROCEDURE — 36415 COLL VENOUS BLD VENIPUNCTURE: CPT

## 2025-06-10 PROCEDURE — 83690 ASSAY OF LIPASE: CPT

## 2025-06-10 PROCEDURE — 96374 THER/PROPH/DIAG INJ IV PUSH: CPT

## 2025-06-10 PROCEDURE — 2580000003 HC RX 258: Performed by: EMERGENCY MEDICINE

## 2025-06-10 PROCEDURE — 81003 URINALYSIS AUTO W/O SCOPE: CPT

## 2025-06-10 PROCEDURE — 72125 CT NECK SPINE W/O DYE: CPT

## 2025-06-10 PROCEDURE — 93005 ELECTROCARDIOGRAM TRACING: CPT | Performed by: EMERGENCY MEDICINE

## 2025-06-10 RX ORDER — MECLIZINE HYDROCHLORIDE 25 MG/1
25 TABLET ORAL 3 TIMES DAILY PRN
Qty: 20 TABLET | Refills: 0 | Status: SHIPPED | OUTPATIENT
Start: 2025-06-10 | End: 2025-06-17

## 2025-06-10 RX ORDER — MECLIZINE HCL 12.5 MG 12.5 MG/1
25 TABLET ORAL ONCE
Status: COMPLETED | OUTPATIENT
Start: 2025-06-10 | End: 2025-06-10

## 2025-06-10 RX ORDER — ONDANSETRON 2 MG/ML
4 INJECTION INTRAMUSCULAR; INTRAVENOUS ONCE
Status: COMPLETED | OUTPATIENT
Start: 2025-06-10 | End: 2025-06-10

## 2025-06-10 RX ORDER — ONDANSETRON 4 MG/1
4 TABLET, ORALLY DISINTEGRATING ORAL 3 TIMES DAILY PRN
Qty: 21 TABLET | Refills: 0 | Status: SHIPPED | OUTPATIENT
Start: 2025-06-10

## 2025-06-10 RX ORDER — 0.9 % SODIUM CHLORIDE 0.9 %
1000 INTRAVENOUS SOLUTION INTRAVENOUS ONCE
Status: COMPLETED | OUTPATIENT
Start: 2025-06-10 | End: 2025-06-10

## 2025-06-10 RX ADMIN — SODIUM CHLORIDE 1000 ML: 9 INJECTION, SOLUTION INTRAVENOUS at 07:22

## 2025-06-10 RX ADMIN — ONDANSETRON 4 MG: 2 INJECTION INTRAMUSCULAR; INTRAVENOUS at 07:05

## 2025-06-10 RX ADMIN — MECLIZINE 25 MG: 12.5 TABLET ORAL at 07:23

## 2025-06-10 NOTE — ED PROVIDER NOTES
much better.  Declines additional medication.  Son at bedside. [JS]      ED Course User Index  [JS] Rhiannon Sahni DO         :  Unless otherwise noted below, none     Procedures    FINAL IMPRESSION      1. Vertigo    2. Fall, initial encounter          DISPOSITION/PLAN   DISPOSITION   home               PATIENT REFERRED TO:  Leona Florentino MD  [address] [place] [address] DR NGUYEN Jeana  [facility] [address]  [phone]    Schedule an appointment as soon as possible for a visit in 2 days  [facility]    [facility] Emergency Department  [address] [facility] [address] [phone]    If symptoms worsen      DISCHARGE MEDICATIONS:  Discharge Medication List as of 6/10/2025  8:23 AM        START taking these medications    Details   meclizine (ANTIVERT) 25 MG tablet Take 1 tablet by mouth 3 times daily as needed for Dizziness, Disp-20 tablet, R-0Normal      ondansetron (ZOFRAN-ODT) 4 MG disintegrating tablet Take 1 tablet by mouth 3 times daily as needed for Nausea or Vomiting, Disp-21 tablet, R-0Normal                (Please note that portions of this note were completed with a voice recognition program.  Efforts were made to edit thedictations but occasionally words are mis-transcribed.)EMR Dragon/Transcription disclaimer:   Much of this encounter note is an electronic transcription/translation of spoken language to printed text. The electronic translation of spoken language may permit erroneous, or at times, nonsensical words or phrases to be inadvertently transcribed; although attempts have made to review the note for such errors, some may still exist.    Rhiannon Sahni DO (electronically signed)Emergency Physician          Rhiannon Sahni DO  06/10/25 0067

## 2025-06-10 NOTE — ED NOTES
Pt very disheveled, covered in dirt, mud, grass and vomit. Pt had been incontinent of urine and was wet from head to toe.   Pt cleaned at this time, dry brief in place, warm blankets provided. Fresh emesis bag provided. Placed on cardiac monitor, call light within reach.   While turning pt from side to side, pt became very dizzy and felt like she was going to throw up. Dr Sahni made aware.  Pt also asking repetitive statements about whether or not she will be admitted, forgets that we have spoken about that within a few minutes and asks again. Pt states that she was lying in the ditch from about 10 pm last night, talking about something to do with \"looking for the children.\"

## 2025-06-11 ENCOUNTER — OFFICE VISIT (OUTPATIENT)
Dept: PALLATIVE CARE | Age: 65
End: 2025-06-11
Payer: MEDICARE

## 2025-06-11 ENCOUNTER — SOCIAL WORK (OUTPATIENT)
Dept: PALLATIVE CARE | Age: 65
End: 2025-06-11

## 2025-06-11 DIAGNOSIS — Z74.2 NEED FOR ASSISTANCE AT HOME WITHOUT OTHER HOUSEHOLD MEMBER ABLE TO RENDER CARE: ICD-10-CM

## 2025-06-11 DIAGNOSIS — E11.65 UNCONTROLLED TYPE 2 DIABETES MELLITUS WITH HYPERGLYCEMIA (HCC): ICD-10-CM

## 2025-06-11 DIAGNOSIS — R41.3 MEMORY LOSS: Primary | ICD-10-CM

## 2025-06-11 DIAGNOSIS — R46.89 WANDERING: ICD-10-CM

## 2025-06-11 DIAGNOSIS — Z51.5 ENCOUNTER FOR PALLIATIVE CARE: ICD-10-CM

## 2025-06-11 PROCEDURE — 99350 HOME/RES VST EST HIGH MDM 60: CPT

## 2025-06-11 PROCEDURE — 3044F HG A1C LEVEL LT 7.0%: CPT

## 2025-06-11 NOTE — PROGRESS NOTES
values, and used these to establish or modify goals of care, and Provided emotional support     Diagnosis Orders   1. Memory loss        2. Uncontrolled type 2 diabetes mellitus with hyperglycemia (HCC)        3. Wandering        4. Need for assistance at home without other household member able to render care        5. Encounter for palliative care          RECOMMENDATIONS/PLAN:  Memory loss  - Chronic s/p TBI with stroke and reportedly worsening since approximately April of this year per daughter  - Wandering behaviors and falls  - Oriented to person, place, time, disoriented to situation  - Poor safety awareness, judgement and reasoning, and health insight  - Recommend follow-up with Neurology    2. DMII  - Patient observed to be shaky and weak ambulating in kitchen, stated she felt like her sugar was low. Resolved with eating cereal.   - Recent hypoglycemia in the 50s reported by daughter even with recent decrease in insulin to 60 units per PCP  - Suspect hypoglycemia exacerbated by inconsistent/poor eating habits  - No glucometer present in the home, daughter states she will buy one today because they keep losing them and lost this one yesterday  - Recommended decreasing insulin again from 60 down to 50 units until they can follow-up with PCP for additional guidance  - Glucose tablets called to pharmacy, discussed with daughter and patient    3. Wandering  - Repeat episodes of wandering and falls, confused and searching for grandchildren that are not there  - Alarm installed on front door  - Daughter to install cameras in the home  - SW looking to obtain LifeAlert button  - Daughter requesting facility placement, SW following for guidance  - High-risk for falls, repeat wandering attempts without supervision, discussed this with daughter    4. Need for assistance at home without other household member able to render care  - Patient requires assistance with remembering to eat, take medications, ambulate up the

## 2025-06-11 NOTE — PROGRESS NOTES
I was asked to see Violeta Lim today in her home along with STANFORD Moe to start services for [facility].  Upon arrival, her dtr Tere met me in the driveway and escorted us in the home.  There is a sound alarm on the front door.  Violeta is in her bedroom and just getting up so Tere seats us in the kitchen to provide insight.  Violeta is  and has been for many years.  She has 4 children - Tere (present and lives in [facility]), Hugh (lives [place]), Chasity (lives in [place]) and Angie (Lives down the street).  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  She became disabled in 2009 after a stroke.  She suffered a TBI.  It left her disabled but for the most part independent.  She has in recent months showed a decline in cognitive ability and since April has had a few instances of getting out of the home and being found outside.  Yesterday she got out feeling as though she needed to find her granddtrs and fell and was outside over night until she was found by a passerby and subsequently taken to ER.  She was released back home.  IN April she left the hospital AMA.  Her dtr Tere checks on her daily and states that she is now pregnant and will not be able to maintain this after the birth of the baby.  She and her sister Angie are POA.  Violeta has been resistant to safety measures and does not want to leave her home for a SNF.  Tere has placed the alarm on the door and is installing cameras.  I also provided her with resource for medical alert device.  Violeta's income is approx $2600 per month from social security and Tere is uncertain of the rest of her finances but is going to try to find out as this will be an important factor in developing a long term plan.  She has also been in communication with an Elder Care / Estate Planning attny.    Tere has gotten someone to come once per week to assist with housecleaning as the home is unkept and has many repair issues but none that seem to make the

## 2025-06-17 ENCOUNTER — TELEPHONE (OUTPATIENT)
Dept: PALLATIVE CARE | Age: 65
End: 2025-06-17

## 2025-06-17 NOTE — TELEPHONE ENCOUNTER
Called Tere to follow up on our discussions at visit at her mother Violeta's home on 6/11.  Tere is currently on vacation but states that she has not heard about any major issues since she has been away.  She does report that Violeta has removed the alarms from the doors, which Tere suspected was a strong possibility.  Tere has gotten a camera system and they are going to work to get it installed for better observation.  She states that she will schedule a visit with Dr Villalobos's  office for neurology as suggested by Lizette COYNE.  We discussed the [facility] and [employer] waivers.  Both of which are on a wait list.  I did explain that in order to apply and get on the wait list they have to apply for Medicaid and therefore more information will be needed re: her financial situation to see if she would financially qualify.  She voiced understanding and plans to pursue gaining more insight into this.    We agreed that I would reach out periodically to offer guidance and monitor their situation.  I did explain there is no quick fix to their situation but appreciate her efforts to help to keep Violeta as safe as Violeta will allow.

## 2025-06-23 ENCOUNTER — TELEPHONE (OUTPATIENT)
Dept: NEUROLOGY | Age: 65
End: 2025-06-23

## 2025-06-23 ENCOUNTER — CARE COORDINATION (OUTPATIENT)
Dept: CARE COORDINATION | Age: 65
End: 2025-06-23

## 2025-06-23 NOTE — CARE COORDINATION
Ambulatory Care Coordination Note     2025 9:20 AM     Patient Current Location:  Home: [address] [place] [address] [place] [address]     ACM contacted the family by telephone. Verified name and  with daughter as identifiers.         ACM: Brittany Polk RN     Challenges to be reviewed by the provider   Additional needs identified to be addressed with provider Yes  Patient needs follow up with Neurology - family requesting Dr. Roldan             Method of communication with provider: phone.    Utilization: Patient has not had any utilization since our last call.     Care Summary Note: ACM placed follow up call to daughterTere. Daughter feels visit with Palliative Care was helpful and stated pt is not as severe, seems to have cycles of worsening behavior/cognitive function that wax and wane. Daughter has not had the opportunity to contact Neuro as recommended by Palliative. Daughter reported preference to see Dr. Roldan - prefers morning hours 9-11 am if possible. ACM contacted [facility] and notified staff patient needs follow up and would like to see Dr. Roldan. Staff will relay the request to the office personnel and they will reach out to daughter for scheduling. Will follow up again to assure scheduling.    Offered patient enrollment in the Remote Patient Monitoring (RPM) program for in-home monitoring: Yes, but did not enroll at this time: limited patient ability to navigate RPM/equipment.     Assessments Completed:   Care Coordination Interventions    Referral from Primary Care Provider: Yes  Suggested Interventions and Community Resources  Disease Specific Clinic: Completed (Comment: Neurology)  Palliative Care: Not Started (Comment: Routed request for referral to PCP 25)  Specialty Services Referral: In Process (Comment: Referral to Speech Pathology)  Zone Management Tools: In Process (Comment: PCP referral: cognitive deficit, home support needs. DM(6.7))  Other Services or Interventions:

## 2025-06-24 ENCOUNTER — TELEPHONE (OUTPATIENT)
Dept: PALLATIVE CARE | Age: 65
End: 2025-06-24

## 2025-06-27 ENCOUNTER — OFFICE VISIT (OUTPATIENT)
Dept: PALLATIVE CARE | Age: 65
End: 2025-06-27
Payer: MEDICARE

## 2025-06-27 ENCOUNTER — TELEPHONE (OUTPATIENT)
Dept: PHARMACY | Facility: CLINIC | Age: 65
End: 2025-06-27

## 2025-06-27 ENCOUNTER — SOCIAL WORK (OUTPATIENT)
Dept: PALLATIVE CARE | Age: 65
End: 2025-06-27

## 2025-06-27 DIAGNOSIS — Z63.9 DYSFUNCTIONAL FAMILY PROCESSES: ICD-10-CM

## 2025-06-27 DIAGNOSIS — R46.89 WANDERING BEHAVIOR: ICD-10-CM

## 2025-06-27 DIAGNOSIS — R41.3 MEMORY LOSS: Primary | ICD-10-CM

## 2025-06-27 DIAGNOSIS — Z51.5 ENCOUNTER FOR PALLIATIVE CARE: ICD-10-CM

## 2025-06-27 PROCEDURE — 1123F ACP DISCUSS/DSCN MKR DOCD: CPT

## 2025-06-27 PROCEDURE — 99350 HOME/RES VST EST HIGH MDM 60: CPT

## 2025-06-27 SDOH — SOCIAL STABILITY - SOCIAL INSECURITY: PROBLEM RELATED TO PRIMARY SUPPORT GROUP, UNSPECIFIED: Z63.9

## 2025-06-27 NOTE — PROGRESS NOTES
Today STANFORD Moe and I met with the children of Violeta Lim at the home of Angie (dtr) located at [address] [place] [address] in [place].  Present for this meeting was Lizette COYNE, myself, Hugh (son), Angie (dtr), Tere (dtr) and Chasity (dtr).  We sat around a dining room table to discuss concerns related to safety and health of Violeta in an effort to develop a plan for safety and care and supervision.  There was immediate tension noted amongst the 4 children - particularly with Angie and Tere.  Tere has taken on a great deal of care for Violeta and others have pulled back as a result of family dynamics and tension.  We identified the concerns of safety due to recent episodes of wandering and noncompliance with medication and diabetic needs.  Tere has set up and appointment with Dr. Villalobos in neurology as Lizette COYNE has requested in an effort to gain insight into her cognitive decline and determine level of decision making capacity is possible.  We made recommendations of cameras for increased supervision when she is alone, taking away her access to her medications and developing a schedule for who will give her her medication on certain days. Both Tere and Angie have POA but they do not do this in conjunction with each other and this seems to have elevated tensions.  They did explain that she does have over the medicaid limits for assets but they did not elaborate exactly how much.  We discussed using her assets to pay for private pay caregivers and they do plan to do this.  I have provided a list to Tere and emailed a list to Angie at her request to [email or web address].    I also have sent her information related to Medicaid eligibility for long term care medicaid as well as information on guardianship.  Angie plans to apply for guardianship once the neurologist has seen and assessed Violeta.  I plan to follow up in a few weeks to determine progress made on the things discussed and will guide

## 2025-06-30 ENCOUNTER — CLINICAL DOCUMENTATION (OUTPATIENT)
Dept: PALLATIVE CARE | Age: 65
End: 2025-06-30

## 2025-06-30 DIAGNOSIS — E11.65 UNCONTROLLED TYPE 2 DIABETES MELLITUS WITH HYPERGLYCEMIA (HCC): ICD-10-CM

## 2025-06-30 NOTE — PROGRESS NOTES
Today I received an email that dtr Angie had sent to Kaylene Hidalgo MA.  It was the paperwork for guardianship but it was the forms to be completed by the court appointed interdisciplinary team.   I call Angie to explain this.  She was planning to go [place] and get the correct forms.

## 2025-07-01 ENCOUNTER — TELEPHONE (OUTPATIENT)
Dept: PALLATIVE CARE | Age: 65
End: 2025-07-01

## 2025-07-01 NOTE — PROGRESS NOTES
Supportive Care/Community Based Palliative Care  Follow Up Note      Patient Name:  Violeta Lim  Medical Record Number:  [id number]  YOB: 1960    Date of Visit: 6/27/2025  Location of Visit:  Other - home of daughter Angie    Patient Care Team:  Leona Florentino MD as PCP - General (Internal Medicine)  Le[facility] MD as [facility]  Kenney Roldan MD as Consulting Physician (Neurology)  Lobito Villalobos MD as Consulting Physician (Neurology)  Brittany Polk RN as Ambulatory Care Manager  Lizette Avila APRN - CNP as Nurse Practitioner (Palliative Medicine)    Reason for Consult:   Goals of care   Symptom Management    ACP  Family Support  Patient Support    History obtained from:  Family Member, electronic medical record, AngieTere Bobby, Crystal - children    CHIEF CONCERN:     Follow-up on home safety and goals of care planning    CLINICAL SUMMARY AND HISTORY:     Violeta Lim is a 65 y.o. female with PMH of brain injury, memory loss, DMII, hypothyroidism, hypercholesterolemia, osteoarthritis, morbid obesity, depression, and other comorbidities. She has been in the ED recently multiple times for confusion with wandering behaviors and falls outside. Palliative care has been consulted for symptom management, goals of care discussions, and patient and family support.     Past Medical History:        Diagnosis Date    Acquired hypothyroidism     Brain injury due to ischemia     Diabetes (HCC)     Episode of recurrent major depressive disorder 01/10/2021    Generalized osteoarthritis 03/21/2018    Hypothyroidism     Morbid obesity due to excess calories (HCC) 06/24/2018    Movement disorder     joint pain    Type 2 diabetes mellitus without complication, with long-term current use of insulin (HCC)        Past Surgical History:    Past Surgical History:   Procedure Laterality Date    CHOLECYSTECTOMY      TUBAL LIGATION         Family History:       Problem Relation Age of Onset

## 2025-07-01 NOTE — TELEPHONE ENCOUNTER
Received a call today from Tere (dtr) explaining to me that she intended to contest the guardianship application if filed by Angie (Violeta's other dtr).  She plan to consult with Attny to make decision on how to proceed.   She is also considering options re: paid care.  There is much contention amongst these two dtrs of Violeta.  I did alert Tere that we cannot choose sides in their moses and reiterated our concerns and the goals established at our meeting on 6/27.    She voiced understanding.  She stated that she had reached out to Brittany Polk trying to find assist for care.  I explained palliative care role and that the resources are private pay or low income and, per Tere, Violeta has resources that she would have to spend down before she would qualify for any low income programming.  I also explained the wait lists associated with much of this programming but that she could not start on a wait list until she financially qualified.    Tere was understanding and agreed to keep us informed.

## 2025-07-10 ENCOUNTER — CARE COORDINATION (OUTPATIENT)
Dept: CASE MANAGEMENT | Age: 65
End: 2025-07-10

## 2025-07-10 ENCOUNTER — CARE COORDINATION (OUTPATIENT)
Dept: CARE COORDINATION | Age: 65
End: 2025-07-10

## 2025-07-10 NOTE — CARE COORDINATION
Ambulatory Care Coordination Note     7/10/2025 12:54 PM     ACM outreach attempt by this ACM today to perform care management follow up . ACM was unable to reach the family, daughter by telephone today;   left voice message requesting a return phone call to this ACM.     ACM: Marycarmen Sahu RN     Care Summary Note: Called patient/family for follow up with CTN program. Left message.    Received return call.    PCP/Specialist follow up:   Future Appointments         Provider Specialty Dept Phone    2025 2:00 PM Lobito Villalobos MD Neurology [phone]    2025 12:00 PM Leona Florentino MD Internal Medicine [phone]            Follow Up:   Plan for next AC outreach in approximately 1 week to complete:  - disease specific assessments  - medication review  - goal progression.      Marycarmen Sahu RN,BSN  Care Transition Nurse  [phone]      Ambulatory Care Coordination Note     7/10/2025 12:55 PM     Patient Current Location:  Home: [address] [place] [id number]     ACM contacted the family by telephone. Verified name and  with family as identifiers.         ACM: Marycarmen Sahu RN     Challenges to be reviewed by the provider   Additional needs identified to be addressed with provider No  NA               Method of communication with provider: none.    Utilization: Patient has not had any utilization since our last call.     Care Summary Note: Daughter Tere returned call.   States patient continues to be at baseline. States that patient's am blood sugar is running around 100 and pm is around 250.  States patient ambulating with cane when remembers. States cameras have been installed in home to support patient with safety. States another family member is working on getting a hired caregiver to assist with patients care.  States no complications with b/b except around ozempic injection and then back to normal with bowels.  States patient to see neurologist next week.    Offered patient

## 2025-07-15 ENCOUNTER — OFFICE VISIT (OUTPATIENT)
Dept: PALLATIVE CARE | Age: 65
End: 2025-07-15
Payer: MEDICARE

## 2025-07-15 DIAGNOSIS — Z51.5 ENCOUNTER FOR PALLIATIVE CARE: ICD-10-CM

## 2025-07-15 DIAGNOSIS — R46.89 WANDERING: ICD-10-CM

## 2025-07-15 DIAGNOSIS — R41.3 MEMORY LOSS: Primary | ICD-10-CM

## 2025-07-15 DIAGNOSIS — Z63.9 DYSFUNCTIONAL FAMILY PROCESSES: ICD-10-CM

## 2025-07-15 DIAGNOSIS — E11.65 UNCONTROLLED TYPE 2 DIABETES MELLITUS WITH HYPERGLYCEMIA (HCC): ICD-10-CM

## 2025-07-15 PROCEDURE — 99349 HOME/RES VST EST MOD MDM 40: CPT

## 2025-07-15 PROCEDURE — 3044F HG A1C LEVEL LT 7.0%: CPT

## 2025-07-15 PROCEDURE — 1123F ACP DISCUSS/DSCN MKR DOCD: CPT

## 2025-07-15 SDOH — SOCIAL STABILITY - SOCIAL INSECURITY: PROBLEM RELATED TO PRIMARY SUPPORT GROUP, UNSPECIFIED: Z63.9

## 2025-07-15 NOTE — PROGRESS NOTES
Supportive Care/Community Based Palliative Care  Follow Up Note      Patient Name:  Violeta Lim  Medical Record Number:  [id number]  YOB: 1960    Date of Visit: 7/15/2025  Location of Visit:  home    Patient Care Team:  Leona Florentino MD as PCP - General (Internal Medicine)  [facility]  Kenney Roldan MD as Consulting Physician (Neurology)  Lobito Villalobos MD as Consulting Physician (Neurology)  Brittany Polk RN as Ambulatory Care Manager  Lizette Avila APRN - CNP as Nurse Practitioner (Palliative Medicine)    Reason for Consult:   Goals of care   Symptom Management    ACP  Family Support  Patient Support    History obtained from:  Patient, Daughter, electronic medical record    CHIEF CONCERN:     Chief Complaint   Patient presents with    Follow-up       CLINICAL SUMMARY AND HISTORY:     Violeta Lim is a 65 y.o. female with PMH of brain injury, memory loss, DMII, hypothyroidism, hypercholesterolemia, osteoarthritis, morbid obesity, depression, and other comorbidities. She has been in the ED recently multiple times for confusion with wandering behaviors and falls outside. Palliative care has been consulted for symptom management, goals of care discussions, and patient and family support.     Past Medical History:        Diagnosis Date    Acquired hypothyroidism     Brain injury due to ischemia     Diabetes (HCC)     Episode of recurrent major depressive disorder 01/10/2021    Generalized osteoarthritis 03/21/2018    Hypothyroidism     Morbid obesity due to excess calories (HCC) 06/24/2018    Movement disorder     joint pain    Type 2 diabetes mellitus without complication, with long-term current use of insulin (HCC)        Past Surgical History:    Past Surgical History:   Procedure Laterality Date    CHOLECYSTECTOMY      TUBAL LIGATION         Family History:       Problem Relation Age of Onset    Diabetes Mother     Bipolar Disorder Mother        Social

## 2025-07-16 ENCOUNTER — SOCIAL WORK (OUTPATIENT)
Dept: PALLATIVE CARE | Age: 65
End: 2025-07-16

## 2025-07-16 ENCOUNTER — HOSPITAL ENCOUNTER (OUTPATIENT)
Dept: SPEECH THERAPY | Age: 65
Setting detail: THERAPIES SERIES
Discharge: HOME | End: 2025-07-16
Payer: MEDICARE

## 2025-07-16 PROCEDURE — 92523 SPEECH SOUND LANG COMPREHEN: CPT

## 2025-07-16 NOTE — PROGRESS NOTES
SLP Therapy  Facility/Department: [facility] OUTPATIENT SPEECH THERAPY  Initial Assessment    NAME: Violeta Lim  : 1960  MRN: [id number]    Date of Eval: 2025  Evaluating Therapist: ZACK Mckeon    Diagnosis:  Memory Loss R41.3 and Z87.820    Past Medical History: has a past medical history of Acquired hypothyroidism, Brain injury due to ischemia, Diabetes ([facility]), Episode of recurrent major depressive disorder, Generalized osteoarthritis, Hypothyroidism, Morbid obesity due to excess calories ([facility]), Movement disorder, and Type 2 diabetes mellitus without complication, with long-term current use of insulin ([facility]).  Past Surgical History:  has a past surgical history that includes Cholecystectomy and Tubal ligation.    Primary Complaint: Patient reports that she came today \"because her daughter made her.\"  She denies any difficulty functioning at home, but her daughter reports that she wants the patient to have more consistent social interactions, get out of the house, and be more independent with activities of daily living such as updating her calendar to be more oriented.    Pertinent Medical History:  Taken from neurology visit 2023:  \"Patient's cognitive dysfunction is likely multifactorial.  I find no clear evidence of dementia based upon today's examination in which she did rather well.  Unclear if some of her difficulties could be related to hypoglycemia or perhaps an unwitnessed or unknown seizure disorder.  She does have a history of ischemic change as well as hemorrhagic stroke remotely.  Since she looks so well today I have not ordered any further testing.  In addition to an EEG I have recommended cognitive rehab through speech therapy and then a follow-up visit in the future to reevaluate.    She did attend speech therapy services at this clinic in February through 2023 with education and training in compensatory memory strategies and functional executive functioning tasks.

## 2025-07-16 NOTE — PROGRESS NOTES
Today I had a follow up visit at Violeta's home.  Upon arrival, she and Tere had just gotten back from going to Speech Therapy, a visit to the [facility] and lunch at [facility] [employer].  Violeta was at the kitchen table having home cereal.    Tere explained that they have put up the cameras.  Hugh and Angie are in charge of giving her her medication and Tere feels this is going well.  There is a chart on the refridgerator but only Angie is charting it.  I texted Angie to see if she could communicate with Hugh to write down when he gives her meds as well so that they can keep better track since two different people are giving meds to avoid confusion.  Tere states that Angie is also in charge of seeking out the paid caregiver.  I texted Angie to check on the progress of this.  Tere states that she has figured Violeta could manage $1000 per month.  They are seeking independent paid care rather than agency to be able to afford more hours.  We figured this would give them approx 18 hours per week.  Tere is coming by daily each morning.  She is tending to the yard and the home is noticeably less cluttered.  Tere reports that the cleaning lady comes 3-4 times a month and Tere has been working on this herself.    The medication was in a lock box in the fridge but Violeta is bothered by that.  She does tell me that she is not to take her medication alone at this time.  Tere tells me that her father (Violeta's  who she has been  from since 2009) went with her to the attny appt (Saul burgos).  His name is Hugh.  The attny informed that they do not yet need to pursue guardianship and she is taking his advice.  She did state that if guardianship became needed then Hugh ( and father to the children) would be the guardian to avoid further conflict amongst Violeta's children.  They have not had any more wandering incidences.  Tere is looking into a gps that can be worn like permanent jewelry.

## 2025-07-17 ENCOUNTER — OFFICE VISIT (OUTPATIENT)
Dept: NEUROLOGY | Age: 65
End: 2025-07-17
Payer: MEDICARE

## 2025-07-17 VITALS
HEIGHT: 67 IN | BODY MASS INDEX: 39.71 KG/M2 | DIASTOLIC BLOOD PRESSURE: 76 MMHG | RESPIRATION RATE: 16 BRPM | HEART RATE: 110 BPM | SYSTOLIC BLOOD PRESSURE: 148 MMHG | WEIGHT: 253 LBS | OXYGEN SATURATION: 98 %

## 2025-07-17 DIAGNOSIS — R41.3 MEMORY LOSS: Primary | ICD-10-CM

## 2025-07-17 DIAGNOSIS — Z86.59 HISTORY OF ANXIETY: ICD-10-CM

## 2025-07-17 DIAGNOSIS — Z86.39 HISTORY OF DIABETES MELLITUS: ICD-10-CM

## 2025-07-17 PROCEDURE — 3077F SYST BP >= 140 MM HG: CPT | Performed by: PSYCHIATRY & NEUROLOGY

## 2025-07-17 PROCEDURE — 99214 OFFICE O/P EST MOD 30 MIN: CPT | Performed by: PSYCHIATRY & NEUROLOGY

## 2025-07-17 PROCEDURE — 3078F DIAST BP <80 MM HG: CPT | Performed by: PSYCHIATRY & NEUROLOGY

## 2025-07-17 PROCEDURE — 1123F ACP DISCUSS/DSCN MKR DOCD: CPT | Performed by: PSYCHIATRY & NEUROLOGY

## 2025-07-17 RX ORDER — MEMANTINE HYDROCHLORIDE 5 MG/1
TABLET ORAL
Qty: 120 TABLET | Refills: 0 | Status: SHIPPED | OUTPATIENT
Start: 2025-07-17

## 2025-07-17 NOTE — PROGRESS NOTES
Chief Complaint   Patient presents with    Memory Loss     Patient is having more memory loss   moca done in office       Violeta Lim is a 65 y.o. year old female who is seen for evaluation of her poor memory.  I last saw her in the office 5/23..She has had computerized neuropsychological testing (3/21) and MRI.  Computerized neuropsychological testing 3/21 showed a memory below average but not below normal.  Has a history of intracranial hemorrhage and psychosocial stressors.  Felt to have a multifactorial cognitive impairment.  Prior slums testing 11/22 was suggestive of dementia.  Apparently has longstanding and significant difficulties with anxiety and depression.  Sees psychiatry for these.      Patient has difficulty at times knowing the day of the week, when to take her medication and forgets other things.   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  She had a normal EEG 2/23.  She went to cognitive rehab with speech therapy and did improve some although she declined to do anymore.  She seems extremely resistant and reluctant to do anything about her memory and denies that she has a problem.  Family has disagreed in the past.  She had MoCA testing today and scored 17/30.  Had an unremarkable CT of the brain last month.  She is here today with one of her daughters.  Her memory has gradually worsened but particularly over the last several weeks or months even.  Has begun confabulating and having some delusional thinking at times.  Has wandered off a few times.  Recently had normal blood work and a negative urinalysis last month.  Active Ambulatory Problems     Diagnosis Date Noted    Acquired hypothyroidism     Essential hypertension     Mixed hyperlipidemia     Generalized osteoarthritis 03/21/2018    Morbid obesity due to excess calories (HCC) 06/24/2018    Primary insomnia 11/01/2018    Noncompliance 01/04/2021    Cognitive dysfunction 01/10/2021

## 2025-07-18 ENCOUNTER — CARE COORDINATION (OUTPATIENT)
Dept: CARE COORDINATION | Age: 65
End: 2025-07-18

## 2025-07-18 ENCOUNTER — TELEPHONE (OUTPATIENT)
Dept: NEUROLOGY | Age: 65
End: 2025-07-18

## 2025-07-18 NOTE — TELEPHONE ENCOUNTER
Approved  PA Detail   Prior authorization approved  Payer: [facility] and [employer] - Medicare Case ID: [id number]  Note from payer: PA Case: [id number], Status: Approved, Coverage Starts on: 2025 12:00:00 AM, Coverage Ends on: 2026 12:00:00 AM.  Approval Details    Authorized from 2025 to 2026  Electronic appeal: Not supported  Prior auth initiated by: [facility] #[id number] - [place], [place] - [address]  - P [phone] - F [phone]  View History  Notes     Time User Attachment    Attachment received from payer. 2025  8:49 AM Saul, Sabrina Outgoing Prescription Prior Authorization Response Document      Pharmacy Benefits   Open Encounter SUMAYA BOONE K  -  IRXME BB [id number] [id number] ([facility])    Covered: Retail, Mail Order    Unknown: Specialty, Long-Term Care  Member ID: [id number] BIN: [id number] : 1960   Group ID: [id number] PCN: IS Legal sex: F   Group name: [id number] - /[place] [employer]   Address: [address] [place] [id number]    Medication Being Authorized    memantine (NAMENDA) 5 MG tablet  1 daily for a week, then twice daily for a week, then 1 am and 2 pm for a week, then 2 am and pm  Dispense: 120 tablet Refills: 0   Start: 2025   Class: Normal Diagnoses: Memory loss   This order has been released to its destination.  To be filled at: [facility] #[id number] - [place], [place] - [address] - P [phone] - F [phone]

## 2025-07-18 NOTE — CARE COORDINATION
Ambulatory Care Coordination Note     2025 8:13 AM     Patient Current Location:  Home: [address] [place] [address] [place] [address]     ACM contacted the family by telephone. Verified name and  with family as identifiers.     Patient graduated from the High Risk Care Management program on 2025.  Family verbalizes confidence in the ability to self-manage at this time. has the ability to self-manage at this time..  Care management goals have been completed. No further Ambulatory Care Manager follow up scheduled.      ACM: Brittany Polk RN     Challenges to be reviewed by the provider   Additional needs identified to be addressed with provider No               Method of communication with provider: none.    Utilization: Patient has not had any utilization since our last call.     Care Summary Note: Patient's daughter, Tere, reported that support with Palliative Care services has been very helpful and daughter has gained a lot of knowledge regarding patient's needs. Family support has improved to help fill in care gaps for patient as well. Daughter feels able to manage patient's needs at this time. No further calls will be scheduled. Daughter has ACM's phone number if new questions or support needs arise.    Offered patient enrollment in the Remote Patient Monitoring (RPM) program for in-home monitoring: Patient is not eligible for RPM program because: other.     Assessments Completed:   Care Coordination Interventions    Referral from Primary Care Provider: Yes  Suggested Interventions and Community Resources  Disease Specific Clinic: Completed (Comment: Neurology)  Palliative Care: Completed  Specialty Services Referral: Completed (Comment: Referral to Speech Pathology)  Zone Management Tools: Completed (Comment: PCP referral: cognitive deficit, home support needs. DM(6.7))  Other Services or Interventions:  3hrs/week.          Medications Reviewed:   Meds reviewed at [facility] 24 hrs ago. No changes since

## 2025-07-23 ENCOUNTER — HOSPITAL ENCOUNTER (OUTPATIENT)
Dept: SPEECH THERAPY | Age: 65
Setting detail: THERAPIES SERIES
Discharge: HOME | End: 2025-07-23
Payer: MEDICARE

## 2025-07-23 DIAGNOSIS — R41.3 MEMORY LOSS: Primary | ICD-10-CM

## 2025-07-23 PROCEDURE — 97129 THER IVNTJ 1ST 15 MIN: CPT

## 2025-07-23 PROCEDURE — 97130 THER IVNTJ EA ADDL 15 MIN: CPT

## 2025-07-23 NOTE — PROGRESS NOTES
[facility]   SPEECH THERAPY  Daily Treatment Note    Date: 2025  Patient Name: Violeta Lim        MRN: [id number]    Account #: [id number]  : 1960  (65 y.o.)  Gender: female   Referring Provider: Chadd  Treating Therapist: Annabel Osborne, ZACK     Subjective:     Ms. Lim had a visit with Dr. Villalobos on 25 with the following noted:   Violeta Lim is a 65 y.o. year old female who is seen for evaluation of her poor memory.  I last saw her in the office ..She has had computerized neuropsychological testing (3/21) and MRI.  Computerized neuropsychological testing 3/21 showed a memory below average but not below normal.  Has a history of intracranial hemorrhage and psychosocial stressors.  Felt to have a multifactorial cognitive impairment.  Prior slums testing  was suggestive of dementia.  Apparently has longstanding and significant difficulties with anxiety and depression.  Sees psychiatry for these.      Patient has difficulty at times knowing the day of the week, when to take her medication and forgets other things.   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  She had a normal EEG .  She went to cognitive rehab with speech therapy and did improve some although she declined to do anymore.  She seems extremely resistant and reluctant to do anything about her memory and denies that she has a problem.  Family has disagreed in the past.  She had MoCA testing today and scored 17/30.  Had an unremarkable CT of the brain last month.  She is here today with one of her daughters.  Her memory has gradually worsened but particularly over the last several weeks or months even.  Has begun confabulating and having some delusional thinking at times.  Has wandered off a few times.  Recently had normal blood work and a negative urinalysis last month.     Visit Information:     Visit Information  SLP Insurance Information: [facility]

## 2025-07-30 ENCOUNTER — HOSPITAL ENCOUNTER (OUTPATIENT)
Dept: SPEECH THERAPY | Age: 65
Setting detail: THERAPIES SERIES
Discharge: HOME | End: 2025-07-30
Payer: MEDICARE

## 2025-07-30 PROCEDURE — 97129 THER IVNTJ 1ST 15 MIN: CPT

## 2025-07-30 PROCEDURE — 97130 THER IVNTJ EA ADDL 15 MIN: CPT

## 2025-07-30 NOTE — PROGRESS NOTES
[facility]   SPEECH THERAPY  Daily Treatment Note    Date: 2025  Patient Name: Violeta Lim        MRN: [id number]    Account #: [id number]  : 1960  (65 y.o.)  Gender: female   Referring Provider: Chadd  Treating Therapist: Annabel Osborne, SLP     Subjective: Patient reports she is doing okay. She did attempt to complete some tasks in her home exercise program with prompts from her daughter.        Visit Information:     Visit Information  SLP Insurance Information: [facility] Medicare: Authorization after evaluation  Total # of Visits Approved: 6  Total # of Visits to Date: 2  Timeframe Approved From:: 25  Timeframe Approved To:: 25    Behavioral-Cognition:  Behavior/Cognition  Behavior/Cognition: Alert, Cooperative, Requires cueing    Baseline Assessment:  Baseline Assessment  Respiratory Status: Room air  O2 Device: None (Room air)  Communication Observation: Functional  Follows Directions: Simple    Objective:     Executive Functioning/Memory:    Ms. Lim had completed portions of her home exercise program, including keeping up with the dates by marking off the days as they passed.  When prompted by her daughter, she inconsistently told her the month, day, date, and year.  Patient was not oriented to these at today's session.  She did use a calendar in the room to state the correct day, date, and month.  She did not contact a friend as she was advised, and she did not write down any important activities that she did, although she was cued to do so and given a journal by her daughter.  She was guided to write down appointments for August.    She stated that she would like to shower before coming.  It was discussed that she should probably shower the day before instead of the morning of the visit due to time constraints.  Walked patient through developing a goal of showering before next SLP visit.  Established steps to accomplish the goal.  Assisted patient in identifying the

## 2025-08-06 ENCOUNTER — HOSPITAL ENCOUNTER (OUTPATIENT)
Dept: SPEECH THERAPY | Age: 65
Setting detail: THERAPIES SERIES
Discharge: HOME | End: 2025-08-06
Payer: MEDICARE

## 2025-08-06 PROCEDURE — 97129 THER IVNTJ 1ST 15 MIN: CPT

## 2025-08-06 PROCEDURE — 97130 THER IVNTJ EA ADDL 15 MIN: CPT

## 2025-08-12 ENCOUNTER — OFFICE VISIT (OUTPATIENT)
Dept: PALLATIVE CARE | Age: 65
End: 2025-08-12
Payer: MEDICARE

## 2025-08-12 DIAGNOSIS — R41.3 MEMORY LOSS: ICD-10-CM

## 2025-08-12 DIAGNOSIS — Z51.5 ENCOUNTER FOR PALLIATIVE CARE: ICD-10-CM

## 2025-08-12 DIAGNOSIS — F41.9 ANXIETY: ICD-10-CM

## 2025-08-12 DIAGNOSIS — E11.65 UNCONTROLLED TYPE 2 DIABETES MELLITUS WITH HYPERGLYCEMIA (HCC): ICD-10-CM

## 2025-08-12 DIAGNOSIS — R41.89 MODERATE COGNITIVE IMPAIRMENT: Primary | ICD-10-CM

## 2025-08-12 PROCEDURE — 99349 HOME/RES VST EST MOD MDM 40: CPT

## 2025-08-12 PROCEDURE — 1123F ACP DISCUSS/DSCN MKR DOCD: CPT

## 2025-08-12 PROCEDURE — 3044F HG A1C LEVEL LT 7.0%: CPT

## 2025-08-12 RX ORDER — BUSPIRONE HYDROCHLORIDE 5 MG/1
5 TABLET ORAL 3 TIMES DAILY
Qty: 90 TABLET | Refills: 0 | Status: SHIPPED | OUTPATIENT
Start: 2025-08-12 | End: 2025-09-11

## 2025-08-13 ENCOUNTER — HOSPITAL ENCOUNTER (OUTPATIENT)
Dept: SPEECH THERAPY | Age: 65
Setting detail: THERAPIES SERIES
Discharge: HOME | End: 2025-08-13
Payer: MEDICARE

## 2025-08-13 PROCEDURE — 97130 THER IVNTJ EA ADDL 15 MIN: CPT

## 2025-08-13 PROCEDURE — 97129 THER IVNTJ 1ST 15 MIN: CPT

## 2025-08-20 ENCOUNTER — HOSPITAL ENCOUNTER (OUTPATIENT)
Dept: SPEECH THERAPY | Age: 65
Setting detail: THERAPIES SERIES
Discharge: HOME | End: 2025-08-20
Payer: MEDICARE

## 2025-08-20 PROCEDURE — 97129 THER IVNTJ 1ST 15 MIN: CPT

## 2025-08-20 PROCEDURE — 97130 THER IVNTJ EA ADDL 15 MIN: CPT

## 2025-08-22 ENCOUNTER — OFFICE VISIT (OUTPATIENT)
Age: 65
End: 2025-08-22
Payer: MEDICARE

## 2025-08-22 VITALS
WEIGHT: 251 LBS | HEART RATE: 81 BPM | BODY MASS INDEX: 39.39 KG/M2 | HEIGHT: 67 IN | OXYGEN SATURATION: 95 % | SYSTOLIC BLOOD PRESSURE: 128 MMHG | DIASTOLIC BLOOD PRESSURE: 82 MMHG

## 2025-08-22 DIAGNOSIS — E11.40 TYPE 2 DIABETES MELLITUS WITH DIABETIC NEUROPATHY, WITH LONG-TERM CURRENT USE OF INSULIN: ICD-10-CM

## 2025-08-22 DIAGNOSIS — Z79.4 TYPE 2 DIABETES MELLITUS WITH DIABETIC NEUROPATHY, WITH LONG-TERM CURRENT USE OF INSULIN: ICD-10-CM

## 2025-08-22 DIAGNOSIS — E11.9 ENCOUNTER FOR DIABETIC FOOT EXAM: ICD-10-CM

## 2025-08-22 DIAGNOSIS — L60.2 THICKENED NAILS: ICD-10-CM

## 2025-08-22 DIAGNOSIS — R26.9 GAIT DIFFICULTY: ICD-10-CM

## 2025-08-22 DIAGNOSIS — B35.1 ONYCHOMYCOSIS: Primary | ICD-10-CM

## 2025-08-22 RX ORDER — BUSPIRONE HYDROCHLORIDE 5 MG/1
1 TABLET ORAL 3 TIMES DAILY
COMMUNITY
Start: 2025-08-12

## 2025-08-22 RX ORDER — MEMANTINE HYDROCHLORIDE 5 MG/1
5 TABLET ORAL
COMMUNITY
Start: 2025-07-18

## 2025-08-25 ENCOUNTER — HOSPITAL ENCOUNTER (OUTPATIENT)
Dept: SPEECH THERAPY | Age: 65
Setting detail: THERAPIES SERIES
Discharge: HOME | End: 2025-08-25
Payer: MEDICARE

## 2025-08-25 PROCEDURE — 97130 THER IVNTJ EA ADDL 15 MIN: CPT

## 2025-08-25 PROCEDURE — 97129 THER IVNTJ 1ST 15 MIN: CPT

## 2025-08-27 ENCOUNTER — APPOINTMENT (OUTPATIENT)
Dept: SPEECH THERAPY | Age: 65
End: 2025-08-27
Payer: MEDICARE